# Patient Record
Sex: FEMALE | Race: WHITE | NOT HISPANIC OR LATINO
[De-identification: names, ages, dates, MRNs, and addresses within clinical notes are randomized per-mention and may not be internally consistent; named-entity substitution may affect disease eponyms.]

---

## 2017-02-26 ENCOUNTER — TRANSCRIPTION ENCOUNTER (OUTPATIENT)
Age: 82
End: 2017-02-26

## 2018-08-07 ENCOUNTER — APPOINTMENT (OUTPATIENT)
Dept: THORACIC SURGERY | Facility: CLINIC | Age: 83
End: 2018-08-07
Payer: MEDICARE

## 2018-08-07 VITALS
OXYGEN SATURATION: 95 % | HEIGHT: 65 IN | SYSTOLIC BLOOD PRESSURE: 129 MMHG | BODY MASS INDEX: 18.99 KG/M2 | TEMPERATURE: 98 F | DIASTOLIC BLOOD PRESSURE: 70 MMHG | RESPIRATION RATE: 18 BRPM | HEART RATE: 87 BPM | WEIGHT: 114 LBS

## 2018-08-07 DIAGNOSIS — Z87.09 PERSONAL HISTORY OF OTHER DISEASES OF THE RESPIRATORY SYSTEM: ICD-10-CM

## 2018-08-07 DIAGNOSIS — R91.1 SOLITARY PULMONARY NODULE: ICD-10-CM

## 2018-08-07 DIAGNOSIS — Z80.0 FAMILY HISTORY OF MALIGNANT NEOPLASM OF DIGESTIVE ORGANS: ICD-10-CM

## 2018-08-07 PROCEDURE — 99205 OFFICE O/P NEW HI 60 MIN: CPT

## 2018-08-07 RX ORDER — MULTIVIT-MIN/FA/LYCOPEN/LUTEIN .4-300-25
TABLET ORAL
Refills: 0 | Status: ACTIVE | COMMUNITY

## 2018-08-07 RX ORDER — PSYLLIUM HUSK 0.4 G
CAPSULE ORAL
Refills: 0 | Status: ACTIVE | COMMUNITY

## 2018-08-07 RX ORDER — ASPIRIN 81 MG
81 TABLET, DELAYED RELEASE (ENTERIC COATED) ORAL
Refills: 0 | Status: ACTIVE | COMMUNITY

## 2018-08-07 RX ORDER — BACILLUS COAGULANS/INULIN 1B-250 MG
CAPSULE ORAL
Refills: 0 | Status: ACTIVE | COMMUNITY

## 2018-08-07 RX ORDER — UBIDECARENONE/VIT E ACET 100MG-5
CAPSULE ORAL
Refills: 0 | Status: ACTIVE | COMMUNITY

## 2018-08-09 ENCOUNTER — RECORD ABSTRACTING (OUTPATIENT)
Age: 83
End: 2018-08-09

## 2018-08-16 ENCOUNTER — OUTPATIENT (OUTPATIENT)
Dept: OUTPATIENT SERVICES | Facility: HOSPITAL | Age: 83
LOS: 1 days | End: 2018-08-16
Payer: MEDICARE

## 2018-08-16 VITALS
HEIGHT: 64.5 IN | HEART RATE: 96 BPM | TEMPERATURE: 97 F | WEIGHT: 110.89 LBS | DIASTOLIC BLOOD PRESSURE: 70 MMHG | SYSTOLIC BLOOD PRESSURE: 130 MMHG | OXYGEN SATURATION: 96 % | RESPIRATION RATE: 14 BRPM

## 2018-08-16 DIAGNOSIS — R91.8 OTHER NONSPECIFIC ABNORMAL FINDING OF LUNG FIELD: ICD-10-CM

## 2018-08-16 DIAGNOSIS — J44.9 CHRONIC OBSTRUCTIVE PULMONARY DISEASE, UNSPECIFIED: ICD-10-CM

## 2018-08-16 LAB
ALBUMIN SERPL ELPH-MCNC: 4.3 G/DL — SIGNIFICANT CHANGE UP (ref 3.3–5)
ALP SERPL-CCNC: 96 U/L — SIGNIFICANT CHANGE UP (ref 40–120)
ALT FLD-CCNC: 22 U/L — SIGNIFICANT CHANGE UP (ref 4–33)
AST SERPL-CCNC: 26 U/L — SIGNIFICANT CHANGE UP (ref 4–32)
BILIRUB SERPL-MCNC: 0.4 MG/DL — SIGNIFICANT CHANGE UP (ref 0.2–1.2)
BLD GP AB SCN SERPL QL: NEGATIVE — SIGNIFICANT CHANGE UP
BUN SERPL-MCNC: 17 MG/DL — SIGNIFICANT CHANGE UP (ref 7–23)
CALCIUM SERPL-MCNC: 9.9 MG/DL — SIGNIFICANT CHANGE UP (ref 8.4–10.5)
CHLORIDE SERPL-SCNC: 102 MMOL/L — SIGNIFICANT CHANGE UP (ref 98–107)
CO2 SERPL-SCNC: 28 MMOL/L — SIGNIFICANT CHANGE UP (ref 22–31)
CREAT SERPL-MCNC: 0.65 MG/DL — SIGNIFICANT CHANGE UP (ref 0.5–1.3)
GLUCOSE SERPL-MCNC: 90 MG/DL — SIGNIFICANT CHANGE UP (ref 70–99)
HCT VFR BLD CALC: 42 % — SIGNIFICANT CHANGE UP (ref 34.5–45)
HGB BLD-MCNC: 14 G/DL — SIGNIFICANT CHANGE UP (ref 11.5–15.5)
MCHC RBC-ENTMCNC: 30.7 PG — SIGNIFICANT CHANGE UP (ref 27–34)
MCHC RBC-ENTMCNC: 33.3 % — SIGNIFICANT CHANGE UP (ref 32–36)
MCV RBC AUTO: 92.1 FL — SIGNIFICANT CHANGE UP (ref 80–100)
NRBC # FLD: 0 — SIGNIFICANT CHANGE UP
PLATELET # BLD AUTO: 246 K/UL — SIGNIFICANT CHANGE UP (ref 150–400)
PMV BLD: 10.7 FL — SIGNIFICANT CHANGE UP (ref 7–13)
POTASSIUM SERPL-MCNC: 4 MMOL/L — SIGNIFICANT CHANGE UP (ref 3.5–5.3)
POTASSIUM SERPL-SCNC: 4 MMOL/L — SIGNIFICANT CHANGE UP (ref 3.5–5.3)
PROT SERPL-MCNC: 7.2 G/DL — SIGNIFICANT CHANGE UP (ref 6–8.3)
RBC # BLD: 4.56 M/UL — SIGNIFICANT CHANGE UP (ref 3.8–5.2)
RBC # FLD: 13 % — SIGNIFICANT CHANGE UP (ref 10.3–14.5)
RH IG SCN BLD-IMP: POSITIVE — SIGNIFICANT CHANGE UP
SODIUM SERPL-SCNC: 141 MMOL/L — SIGNIFICANT CHANGE UP (ref 135–145)
WBC # BLD: 7.72 K/UL — SIGNIFICANT CHANGE UP (ref 3.8–10.5)
WBC # FLD AUTO: 7.72 K/UL — SIGNIFICANT CHANGE UP (ref 3.8–10.5)

## 2018-08-16 PROCEDURE — 93010 ELECTROCARDIOGRAM REPORT: CPT

## 2018-08-16 RX ORDER — SODIUM CHLORIDE 9 MG/ML
1000 INJECTION, SOLUTION INTRAVENOUS
Qty: 0 | Refills: 0 | Status: DISCONTINUED | OUTPATIENT
Start: 2018-08-23 | End: 2018-09-07

## 2018-08-16 NOTE — H&P PST ADULT - MUSCULOSKELETAL
details… detailed exam no joint erythema/no calf tenderness/no joint swelling/no joint warmth/normal strength/ROM intact

## 2018-08-16 NOTE — H&P PST ADULT - PMH
Colon Polyps    COPD (Chronic Obstructive Pulmonary Disease)  diagnose in ~2013  History of Osteoporosis    Lung nodules

## 2018-08-16 NOTE — H&P PST ADULT - PROBLEM SELECTOR PLAN 1
Pt scheduled for Flexible Bronchoscopy, Endobronchial Ultrasound with Cytology, Possible Mediastinoscopy on 08/23/18  Preop instructions provided. Pt verbalized understanding.   Pepcid for GI prophylaxis provided   s/p cardiac eval, copy in chart

## 2018-08-16 NOTE — H&P PST ADULT - NEGATIVE GENERAL GENITOURINARY SYMPTOMS
no renal colic/no hematuria/no flank pain L/normal urinary frequency/no incontinence/no flank pain R/no bladder infections

## 2018-08-16 NOTE — H&P PST ADULT - HISTORY OF PRESENT ILLNESS
83 y.o. female with hx of COPD, reports multiple right lung nodules noted on routine CXR, followed by CT/PET scan, reports occasional SOB, dyspnea on exertion, fatigue, weight loss of 15 lbs in last 6 months, reports occasional nonproductive cough, presents to [facility] for evaluation for Flexible Bronchoscopy, Endobronchial Ultrasound with Cytology, Possible Mediastinoscopy on 08/23/18 83 y.o. female with hx of COPD, c/o SOB, dyspnea on exertion, fatigue, weight loss of 15 lbs in last 6 months, reports occasional nonproductive cough, s/p CXR, followed by CT scan, reports multiple right lung nodules noted, presents to [facility] for evaluation for Flexible Bronchoscopy, Endobronchial Ultrasound with Cytology, Possible Mediastinoscopy on 08/23/18

## 2018-08-16 NOTE — H&P PST ADULT - FAMILY HISTORY
Father  Still living? Unknown  Family history of lung cancer, Age at diagnosis: Age Unknown     Sibling  Still living? Unknown  Family history of liver cancer, Age at diagnosis: Age Unknown

## 2018-08-16 NOTE — H&P PST ADULT - NEGATIVE MUSCULOSKELETAL SYMPTOMS
no arthritis/no joint swelling/no stiffness/no myalgia/no muscle cramps/no back pain/no neck pain/no muscle weakness

## 2018-08-17 ENCOUNTER — APPOINTMENT (OUTPATIENT)
Dept: PULMONOLOGY | Facility: CLINIC | Age: 83
End: 2018-08-17
Payer: MEDICARE

## 2018-08-17 PROCEDURE — 94726 PLETHYSMOGRAPHY LUNG VOLUMES: CPT

## 2018-08-17 PROCEDURE — 94729 DIFFUSING CAPACITY: CPT

## 2018-08-17 PROCEDURE — 94060 EVALUATION OF WHEEZING: CPT

## 2018-08-22 PROBLEM — R91.8 OTHER NONSPECIFIC ABNORMAL FINDING OF LUNG FIELD: Chronic | Status: ACTIVE | Noted: 2018-08-16

## 2018-08-23 ENCOUNTER — RESULT REVIEW (OUTPATIENT)
Age: 83
End: 2018-08-23

## 2018-08-23 ENCOUNTER — OUTPATIENT (OUTPATIENT)
Dept: OUTPATIENT SERVICES | Facility: HOSPITAL | Age: 83
LOS: 1 days | Discharge: HOME | End: 2018-08-23
Payer: MEDICARE

## 2018-08-23 ENCOUNTER — APPOINTMENT (OUTPATIENT)
Dept: THORACIC SURGERY | Facility: HOSPITAL | Age: 83
End: 2018-08-23
Payer: MEDICARE

## 2018-08-23 VITALS
SYSTOLIC BLOOD PRESSURE: 143 MMHG | RESPIRATION RATE: 16 BRPM | DIASTOLIC BLOOD PRESSURE: 556 MMHG | TEMPERATURE: 97 F | HEART RATE: 78 BPM

## 2018-08-23 VITALS
WEIGHT: 110.89 LBS | OXYGEN SATURATION: 96 % | SYSTOLIC BLOOD PRESSURE: 127 MMHG | TEMPERATURE: 98 F | HEIGHT: 64.5 IN | DIASTOLIC BLOOD PRESSURE: 64 MMHG | HEART RATE: 76 BPM | RESPIRATION RATE: 16 BRPM

## 2018-08-23 DIAGNOSIS — R91.8 OTHER NONSPECIFIC ABNORMAL FINDING OF LUNG FIELD: ICD-10-CM

## 2018-08-23 PROCEDURE — 31625 BRONCHOSCOPY W/BIOPSY(S): CPT

## 2018-08-23 PROCEDURE — 39402 MEDIASTINOSCPY W/LMPH NOD BX: CPT

## 2018-08-23 PROCEDURE — 88305 TISSUE EXAM BY PATHOLOGIST: CPT | Mod: 26,59

## 2018-08-23 PROCEDURE — 88305 TISSUE EXAM BY PATHOLOGIST: CPT | Mod: 26

## 2018-08-23 PROCEDURE — 71045 X-RAY EXAM CHEST 1 VIEW: CPT | Mod: 26

## 2018-08-23 PROCEDURE — 31652 BRONCH EBUS SAMPLNG 1/2 NODE: CPT

## 2018-08-23 PROCEDURE — 88173 CYTOPATH EVAL FNA REPORT: CPT | Mod: 26,59

## 2018-08-23 RX ORDER — FENTANYL CITRATE 50 UG/ML
50 INJECTION INTRAVENOUS
Qty: 0 | Refills: 0 | Status: DISCONTINUED | OUTPATIENT
Start: 2018-08-23 | End: 2018-08-23

## 2018-08-23 RX ORDER — ONDANSETRON 8 MG/1
4 TABLET, FILM COATED ORAL ONCE
Qty: 0 | Refills: 0 | Status: DISCONTINUED | OUTPATIENT
Start: 2018-08-23 | End: 2018-08-23

## 2018-08-23 RX ORDER — HYDROMORPHONE HYDROCHLORIDE 2 MG/ML
0.5 INJECTION INTRAMUSCULAR; INTRAVENOUS; SUBCUTANEOUS
Qty: 0 | Refills: 0 | Status: DISCONTINUED | OUTPATIENT
Start: 2018-08-23 | End: 2018-08-23

## 2018-08-23 RX ORDER — OXYCODONE HYDROCHLORIDE 5 MG/1
1 TABLET ORAL
Qty: 18 | Refills: 0 | OUTPATIENT
Start: 2018-08-23 | End: 2018-08-25

## 2018-08-23 RX ORDER — ACETAMINOPHEN 500 MG
1000 TABLET ORAL ONCE
Qty: 0 | Refills: 0 | Status: COMPLETED | OUTPATIENT
Start: 2018-08-23 | End: 2018-08-23

## 2018-08-23 RX ORDER — HYDROMORPHONE HYDROCHLORIDE 2 MG/ML
0.25 INJECTION INTRAMUSCULAR; INTRAVENOUS; SUBCUTANEOUS ONCE
Qty: 0 | Refills: 0 | Status: DISCONTINUED | OUTPATIENT
Start: 2018-08-23 | End: 2018-08-23

## 2018-08-23 RX ORDER — FENTANYL CITRATE 50 UG/ML
25 INJECTION INTRAVENOUS
Qty: 0 | Refills: 0 | Status: DISCONTINUED | OUTPATIENT
Start: 2018-08-23 | End: 2018-08-23

## 2018-08-23 RX ADMIN — HYDROMORPHONE HYDROCHLORIDE 0.25 MILLIGRAM(S): 2 INJECTION INTRAMUSCULAR; INTRAVENOUS; SUBCUTANEOUS at 13:45

## 2018-08-23 RX ADMIN — Medication 1000 MILLIGRAM(S): at 13:45

## 2018-08-23 RX ADMIN — Medication 400 MILLIGRAM(S): at 13:30

## 2018-08-23 RX ADMIN — HYDROMORPHONE HYDROCHLORIDE 0.25 MILLIGRAM(S): 2 INJECTION INTRAMUSCULAR; INTRAVENOUS; SUBCUTANEOUS at 14:00

## 2018-08-23 NOTE — ASU DISCHARGE PLAN (ADULT/PEDIATRIC). - CONDITIONS AT DISCHARGE
call md for  follow up appointment. Call md for any increase in pain fever or unable to tolerate food or fluids. stable

## 2018-08-23 NOTE — ASU DISCHARGE PLAN (ADULT/PEDIATRIC). - INSTRUCTIONS
Call to make an appointment for next week.  Please call at anytime if you have any questions or concerns.

## 2018-08-23 NOTE — ASU DISCHARGE PLAN (ADULT/PEDIATRIC). - MEDICATION SUMMARY - MEDICATIONS TO TAKE
I will START or STAY ON the medications listed below when I get home from the hospital:    Advair Diskus 250 mcg-50 mcg inhalation powder  -- 1 puff(s) inhaled 2 times a day  -- Indication: For Bronchospasm    Spiriva 18 mcg inhalation capsule  -- 1 cap(s) inhaled once a day  -- Indication: For Bronchospasm I will START or STAY ON the medications listed below when I get home from the hospital:    oxyCODONE 5 mg oral capsule  -- 1 cap(s) by mouth every 4 hours x 3 days, As Needed -for moderate pain MDD:5 caps   -- Caution federal law prohibits the transfer of this drug to any person other  than the person for whom it was prescribed.  It is very important that you take or use this exactly as directed.  Do not skip doses or discontinue unless directed by your doctor.  May cause drowsiness.  Alcohol may intensify this effect.  Use care when operating dangerous machinery.  This prescription cannot be refilled.  Using more of this medication than prescribed may cause serious breathing problems.    -- Indication: For Pain    Advair Diskus 250 mcg-50 mcg inhalation powder  -- 1 puff(s) inhaled 2 times a day  -- Indication: For Bronchospasm    Spiriva 18 mcg inhalation capsule  -- 1 cap(s) inhaled once a day  -- Indication: For Bronchospasm

## 2018-08-23 NOTE — ASU DISCHARGE PLAN (ADULT/PEDIATRIC). - NURSING INSTRUCTIONS
call md for follow up appointment. Call md for any increase in pain fever or unable to tolerate food or fluid

## 2018-08-26 ENCOUNTER — TRANSCRIPTION ENCOUNTER (OUTPATIENT)
Age: 83
End: 2018-08-26

## 2018-08-27 ENCOUNTER — APPOINTMENT (OUTPATIENT)
Dept: THORACIC SURGERY | Facility: HOSPITAL | Age: 83
End: 2018-08-27
Payer: MEDICARE

## 2018-08-27 ENCOUNTER — INPATIENT (INPATIENT)
Facility: HOSPITAL | Age: 83
LOS: 20 days | Discharge: NURSING, REHAB OR LONG-TERM CARE | End: 2018-09-17
Attending: THORACIC SURGERY (CARDIOTHORACIC VASCULAR SURGERY) | Admitting: THORACIC SURGERY (CARDIOTHORACIC VASCULAR SURGERY)
Payer: MEDICARE

## 2018-08-27 ENCOUNTER — RESULT REVIEW (OUTPATIENT)
Age: 83
End: 2018-08-27

## 2018-08-27 VITALS
WEIGHT: 110.89 LBS | RESPIRATION RATE: 24 BRPM | TEMPERATURE: 98 F | HEART RATE: 105 BPM | DIASTOLIC BLOOD PRESSURE: 51 MMHG | OXYGEN SATURATION: 93 % | SYSTOLIC BLOOD PRESSURE: 118 MMHG | HEIGHT: 64.5 IN

## 2018-08-27 DIAGNOSIS — R91.1 SOLITARY PULMONARY NODULE: ICD-10-CM

## 2018-08-27 LAB
ALBUMIN SERPL ELPH-MCNC: 3 G/DL — LOW (ref 3.3–5)
ALP SERPL-CCNC: 95 U/L — SIGNIFICANT CHANGE UP (ref 40–120)
ALT FLD-CCNC: 17 U/L — SIGNIFICANT CHANGE UP (ref 4–33)
AST SERPL-CCNC: 25 U/L — SIGNIFICANT CHANGE UP (ref 4–32)
BILIRUB SERPL-MCNC: 0.7 MG/DL — SIGNIFICANT CHANGE UP (ref 0.2–1.2)
BUN SERPL-MCNC: 17 MG/DL — SIGNIFICANT CHANGE UP (ref 7–23)
CALCIUM SERPL-MCNC: 8.8 MG/DL — SIGNIFICANT CHANGE UP (ref 8.4–10.5)
CHLORIDE SERPL-SCNC: 100 MMOL/L — SIGNIFICANT CHANGE UP (ref 98–107)
CO2 SERPL-SCNC: 21 MMOL/L — LOW (ref 22–31)
CREAT SERPL-MCNC: 0.57 MG/DL — SIGNIFICANT CHANGE UP (ref 0.5–1.3)
GLUCOSE SERPL-MCNC: 105 MG/DL — HIGH (ref 70–99)
HCT VFR BLD CALC: 40.5 % — SIGNIFICANT CHANGE UP (ref 34.5–45)
HGB BLD-MCNC: 13.2 G/DL — SIGNIFICANT CHANGE UP (ref 11.5–15.5)
MAGNESIUM SERPL-MCNC: 2 MG/DL — SIGNIFICANT CHANGE UP (ref 1.6–2.6)
MCHC RBC-ENTMCNC: 30.3 PG — SIGNIFICANT CHANGE UP (ref 27–34)
MCHC RBC-ENTMCNC: 32.6 % — SIGNIFICANT CHANGE UP (ref 32–36)
MCV RBC AUTO: 93.1 FL — SIGNIFICANT CHANGE UP (ref 80–100)
NRBC # FLD: 0 — SIGNIFICANT CHANGE UP
PHOSPHATE SERPL-MCNC: 3 MG/DL — SIGNIFICANT CHANGE UP (ref 2.5–4.5)
PLATELET # BLD AUTO: 219 K/UL — SIGNIFICANT CHANGE UP (ref 150–400)
PMV BLD: 10.2 FL — SIGNIFICANT CHANGE UP (ref 7–13)
POTASSIUM SERPL-MCNC: 3.8 MMOL/L — SIGNIFICANT CHANGE UP (ref 3.5–5.3)
POTASSIUM SERPL-SCNC: 3.8 MMOL/L — SIGNIFICANT CHANGE UP (ref 3.5–5.3)
PROT SERPL-MCNC: 6.6 G/DL — SIGNIFICANT CHANGE UP (ref 6–8.3)
RBC # BLD: 4.35 M/UL — SIGNIFICANT CHANGE UP (ref 3.8–5.2)
RBC # FLD: 13 % — SIGNIFICANT CHANGE UP (ref 10.3–14.5)
SODIUM SERPL-SCNC: 137 MMOL/L — SIGNIFICANT CHANGE UP (ref 135–145)
WBC # BLD: 9.79 K/UL — SIGNIFICANT CHANGE UP (ref 3.8–10.5)
WBC # FLD AUTO: 9.79 K/UL — SIGNIFICANT CHANGE UP (ref 3.8–10.5)

## 2018-08-27 PROCEDURE — 31622 DX BRONCHOSCOPE/WASH: CPT

## 2018-08-27 PROCEDURE — 88313 SPECIAL STAINS GROUP 2: CPT | Mod: 26

## 2018-08-27 PROCEDURE — 32663 THORACOSCOPY W/LOBECTOMY: CPT | Mod: RT

## 2018-08-27 PROCEDURE — 88309 TISSUE EXAM BY PATHOLOGIST: CPT | Mod: 26

## 2018-08-27 PROCEDURE — 88331 PATH CONSLTJ SURG 1 BLK 1SPC: CPT | Mod: 26

## 2018-08-27 PROCEDURE — 99233 SBSQ HOSP IP/OBS HIGH 50: CPT

## 2018-08-27 PROCEDURE — 71045 X-RAY EXAM CHEST 1 VIEW: CPT | Mod: 26

## 2018-08-27 RX ORDER — ACETAMINOPHEN 500 MG
1000 TABLET ORAL ONCE
Qty: 0 | Refills: 0 | Status: COMPLETED | OUTPATIENT
Start: 2018-08-28 | End: 2018-08-28

## 2018-08-27 RX ORDER — METOPROLOL TARTRATE 50 MG
2.5 TABLET ORAL ONCE
Qty: 0 | Refills: 0 | Status: COMPLETED | OUTPATIENT
Start: 2018-08-27 | End: 2018-08-27

## 2018-08-27 RX ORDER — SODIUM CHLORIDE 9 MG/ML
1000 INJECTION, SOLUTION INTRAVENOUS
Qty: 0 | Refills: 0 | Status: DISCONTINUED | OUTPATIENT
Start: 2018-08-27 | End: 2018-08-30

## 2018-08-27 RX ORDER — MAGNESIUM SULFATE 500 MG/ML
1 VIAL (ML) INJECTION ONCE
Qty: 0 | Refills: 0 | Status: COMPLETED | OUTPATIENT
Start: 2018-08-27 | End: 2018-08-27

## 2018-08-27 RX ORDER — IPRATROPIUM BROMIDE 0.2 MG/ML
500 SOLUTION, NON-ORAL INHALATION EVERY 6 HOURS
Qty: 0 | Refills: 0 | Status: DISCONTINUED | OUTPATIENT
Start: 2018-08-27 | End: 2018-08-27

## 2018-08-27 RX ORDER — ALBUTEROL 90 UG/1
2 AEROSOL, METERED ORAL EVERY 4 HOURS
Qty: 0 | Refills: 0 | Status: DISCONTINUED | OUTPATIENT
Start: 2018-08-27 | End: 2018-08-27

## 2018-08-27 RX ORDER — FLUTICASONE PROPIONATE AND SALMETEROL 50; 250 UG/1; UG/1
1 POWDER ORAL; RESPIRATORY (INHALATION)
Qty: 0 | Refills: 0 | COMMUNITY

## 2018-08-27 RX ORDER — ACETAMINOPHEN 500 MG
1000 TABLET ORAL ONCE
Qty: 0 | Refills: 0 | Status: COMPLETED | OUTPATIENT
Start: 2018-08-27 | End: 2018-08-28

## 2018-08-27 RX ORDER — PANTOPRAZOLE SODIUM 20 MG/1
40 TABLET, DELAYED RELEASE ORAL
Qty: 0 | Refills: 0 | Status: DISCONTINUED | OUTPATIENT
Start: 2018-08-27 | End: 2018-08-27

## 2018-08-27 RX ORDER — TIOTROPIUM BROMIDE 18 UG/1
1 CAPSULE ORAL; RESPIRATORY (INHALATION)
Qty: 0 | Refills: 0 | COMMUNITY

## 2018-08-27 RX ORDER — HYDROMORPHONE HYDROCHLORIDE 2 MG/ML
30 INJECTION INTRAMUSCULAR; INTRAVENOUS; SUBCUTANEOUS
Qty: 0 | Refills: 0 | Status: DISCONTINUED | OUTPATIENT
Start: 2018-08-27 | End: 2018-08-31

## 2018-08-27 RX ORDER — DOCUSATE SODIUM 100 MG
100 CAPSULE ORAL THREE TIMES A DAY
Qty: 0 | Refills: 0 | Status: DISCONTINUED | OUTPATIENT
Start: 2018-08-27 | End: 2018-09-01

## 2018-08-27 RX ORDER — FAMOTIDINE 10 MG/ML
20 INJECTION INTRAVENOUS DAILY
Qty: 0 | Refills: 0 | Status: DISCONTINUED | OUTPATIENT
Start: 2018-08-27 | End: 2018-09-09

## 2018-08-27 RX ORDER — NALOXONE HYDROCHLORIDE 4 MG/.1ML
0.1 SPRAY NASAL
Qty: 0 | Refills: 0 | Status: DISCONTINUED | OUTPATIENT
Start: 2018-08-27 | End: 2018-08-31

## 2018-08-27 RX ORDER — IPRATROPIUM BROMIDE 0.2 MG/ML
500 SOLUTION, NON-ORAL INHALATION EVERY 6 HOURS
Qty: 0 | Refills: 0 | Status: DISCONTINUED | OUTPATIENT
Start: 2018-08-27 | End: 2018-09-17

## 2018-08-27 RX ORDER — TIOTROPIUM BROMIDE 18 UG/1
1 CAPSULE ORAL; RESPIRATORY (INHALATION) DAILY
Qty: 0 | Refills: 0 | Status: DISCONTINUED | OUTPATIENT
Start: 2018-08-27 | End: 2018-08-27

## 2018-08-27 RX ORDER — ONDANSETRON 8 MG/1
4 TABLET, FILM COATED ORAL EVERY 6 HOURS
Qty: 0 | Refills: 0 | Status: DISCONTINUED | OUTPATIENT
Start: 2018-08-27 | End: 2018-08-31

## 2018-08-27 RX ORDER — HYDROMORPHONE HYDROCHLORIDE 2 MG/ML
0.5 INJECTION INTRAMUSCULAR; INTRAVENOUS; SUBCUTANEOUS
Qty: 0 | Refills: 0 | Status: DISCONTINUED | OUTPATIENT
Start: 2018-08-27 | End: 2018-08-31

## 2018-08-27 RX ORDER — BUDESONIDE AND FORMOTEROL FUMARATE DIHYDRATE 160; 4.5 UG/1; UG/1
2 AEROSOL RESPIRATORY (INHALATION)
Qty: 0 | Refills: 0 | Status: DISCONTINUED | OUTPATIENT
Start: 2018-08-27 | End: 2018-08-30

## 2018-08-27 RX ORDER — HEPARIN SODIUM 5000 [USP'U]/ML
5000 INJECTION INTRAVENOUS; SUBCUTANEOUS EVERY 8 HOURS
Qty: 0 | Refills: 0 | Status: DISCONTINUED | OUTPATIENT
Start: 2018-08-27 | End: 2018-09-09

## 2018-08-27 RX ORDER — IPRATROPIUM/ALBUTEROL SULFATE 18-103MCG
3 AEROSOL WITH ADAPTER (GRAM) INHALATION EVERY 6 HOURS
Qty: 0 | Refills: 0 | Status: DISCONTINUED | OUTPATIENT
Start: 2018-08-27 | End: 2018-08-27

## 2018-08-27 RX ADMIN — Medication 500 MICROGRAM(S): at 22:02

## 2018-08-27 RX ADMIN — HYDROMORPHONE HYDROCHLORIDE 30 MILLILITER(S): 2 INJECTION INTRAMUSCULAR; INTRAVENOUS; SUBCUTANEOUS at 17:57

## 2018-08-27 RX ADMIN — SODIUM CHLORIDE 30 MILLILITER(S): 9 INJECTION, SOLUTION INTRAVENOUS at 19:32

## 2018-08-27 RX ADMIN — Medication 2.5 MILLIGRAM(S): at 21:00

## 2018-08-27 RX ADMIN — Medication 100 GRAM(S): at 18:52

## 2018-08-27 RX ADMIN — HYDROMORPHONE HYDROCHLORIDE 30 MILLILITER(S): 2 INJECTION INTRAMUSCULAR; INTRAVENOUS; SUBCUTANEOUS at 19:32

## 2018-08-27 RX ADMIN — SODIUM CHLORIDE 30 MILLILITER(S): 9 INJECTION, SOLUTION INTRAVENOUS at 17:50

## 2018-08-27 RX ADMIN — Medication 100 GRAM(S): at 21:00

## 2018-08-27 RX ADMIN — Medication 2.5 MILLIGRAM(S): at 18:29

## 2018-08-27 RX ADMIN — HEPARIN SODIUM 5000 UNIT(S): 5000 INJECTION INTRAVENOUS; SUBCUTANEOUS at 21:00

## 2018-08-27 NOTE — PROGRESS NOTE ADULT - SUBJECTIVE AND OBJECTIVE BOX
LIAM VELOZ          MRN-[id number]    HPI:      Procedure:  POD # :     Issues:        Interval/Overnight Events/ ROS  Pt remained hemodynamically stable overnight, not on any pressors or inotropes. OOB to chair, breathing comfortably with minimal pain. Ambulated several times . Denies pain, no SOB, no palpitations, no nausea/ no vomiting, no dizziness  A-line and soriano d/trina         PAST MEDICAL & SURGICAL HISTORY:  Lung nodules  COPD (Chronic Obstructive Pulmonary Disease): diagnose in ~2013  Colon Polyps  History of Osteoporosis  History of Cataract Surgery: left eye  Hip Replacement: bilateral    Allergies    No Known Allergies    Intolerances            ***VITAL SIGNS:  Vital Signs Last 24 Hrs  T(C): 36.4 (27 Aug 2018 17:45), Max: 36.6 (27 Aug 2018 12:29)  T(F): 97.6 (27 Aug 2018 17:45), Max: 97.8 (27 Aug 2018 12:29)  HR: 95 (27 Aug 2018 18:15) (95 - 111)  BP: 159/77 (27 Aug 2018 18:15) (118/51 - 169/90)  BP(mean): 95 (27 Aug 2018 18:15) (95 - 105)  RR: 24 (27 Aug 2018 18:15) (21 - 24)  SpO2: 96% (27 Aug 2018 18:15) (87% - 99%)    I/Os:   I&O's Detail    27 Aug 2018 07:01  -  27 Aug 2018 18:21  --------------------------------------------------------  IN:    lactated ringers.: 60 mL  Total IN: 60 mL    OUT:    Chest Tube: 10 mL  Total OUT: 10 mL    Total NET: 50 mL          CAPILLARY BLOOD GLUCOSE          =======================  MEDICATIONS  ===================  MEDICATIONS  (STANDING):  ALBUTerol/ipratropium for Nebulization 3 milliLiter(s) Nebulizer every 6 hours  docusate sodium 100 milliGRAM(s) Oral three times a day  famotidine    Tablet 20 milliGRAM(s) Oral daily  heparin  Injectable 5000 Unit(s) SubCutaneous every 8 hours  HYDROmorphone PCA (1 mG/mL) 30 milliLiter(s) PCA Continuous PCA Continuous  lactated ringers. 1000 milliLiter(s) (30 mL/Hr) IV Continuous <Continuous>  metoprolol tartrate Injectable 2.5 milliGRAM(s) IV Push once  tiotropium 18 MICROgram(s) Capsule 1 Capsule(s) Inhalation daily    MEDICATIONS  (PRN):  HYDROmorphone PCA (1 mG/mL) Rescue Clinician Bolus 0.5 milliGRAM(s) IV Push every 15 minutes PRN for Pain Scale GREATER THAN 6  naloxone Injectable 0.1 milliGRAM(s) IV Push every 3 minutes PRN For ANY of the following changes in patient status:  A. RR LESS THAN 10 breaths per minute, B. Oxygen saturation LESS THAN 90%, C. Sedation score of 6  ondansetron Injectable 4 milliGRAM(s) IV Push every 6 hours PRN Nausea      ======================VENTILATOR SETTINGS  ==============      =================== PATIENT CARE ACCESS DEVICES ==========  Peripheral IV  Central Venous Line	R	L	IJ	Fem	SC			Placed:   Arterial Line	R	L	PT	DP	Fem	Rad	Ax	Placed:   Midline:				  Urinary Catheter, Date Placed:   Necessity of urinary, arterial, and venous catheters discussed    ======================= PHYSICAL EXAM===================  General:                         Comfortable, Awake, alert, not in any distress  Neuro:                            Moving all extremities to commands. No focal deficits	  HEENT:                           ELE/ ETT/ NGT/ trach  Respiratory:	Lungs clear on auscultation bilaterally with good aeration.                                           No rales, rhonchi, no wheezing. Effort even and unlabored.  CV:		Regular rate and rhythm. Normal S1/S2. No murmurs  Abdomen:	                     Soft,  nontender, not-distended. Bowel sounds present / absent.   Skin:		No rash.  Extremities:	Warm, no cyanosis or edema.  Palpable pulses    ============================ LABS =======================                      ===================== IMAGING STUDIES ===================  Radiology personally reviewed.    ====================ASSESSMENT AND PLAN ================      ====================== NEUROLOGY=======================  Pain control with PCA / PCEA / Tylenol IV / Toradol / Percocet  Pt is on Precedex for agitation  Pt is sedated with Propofol / Fentanyl    ==================== RESPIRATORY========================  Pt is on            L nasal canula / Face tent____% FiO2  Comfortable, no evidence of distress.  Using incentive spirometry & doing                ml  Monitor chest tube output  Chest tube to suction / water seal	    Mechanical Ventilation:    Mechanical ventilator status assessed & settings reviewed  Continue bronchodilators, pulmonary toilet  Head of bed elevation to 30-40 degrees    ====================CARDIOVASCULAR=====================  Continue hemodynamic monitoring/ telemetry  Not on any pressors  Continue cardiovascular / antihypertensive medications    ===================== RENAL ============================  Continue LR 30CC/hr      D/C IVF  Monitor I/Os, BUN/ Cr  and electrolytes  D/C Soriano      Keep Soriano for UO monitoring  BPH: Continue Flomax/ Finasteride      ==================== GASTROINTESTINAL===================  On regular diet, tolerating well  Continue GI prophylaxis with Pepcid / Protonix  Continue Zofran / Reglan for nausea - PRN	  NPO    =======================    ENDOCRIN  =====================  Glycemic monitoring  F/S with coverage  ===================HEMATOLOGIC/ONCOLOGIC =============  Monitor chest tube output. No signs of active bleeding.   Follow CBC, coags  in AM  DVT prophylaxis with SCD, sc Heparin    ========================INFECTIOUS DISEASE===============  No signs of infection. Monitor for fever / leukocytosis.  All surgical incision / chest tube  sites look clean  D/C Soriano      Pertinent clinical, laboratory, radiographic, hemodynamic, echocardiographic, respiratory data, microbiologic data and chart were reviewed and analyzed frequently throughout the course of the day and night. GI and DVT prophylaxis, glycemic control, head of bed elevation and skin care issues were addressed.  Patient seen, examined and plan discussed with CT Surgery / CTICU team during rounds.  Pt remains critically ill in imminent risk of  deterioration and requires very careful cardio- pulmonary monitoring and support.    I have spent               minutes of critical care time with this pt between            am/pm    and               am/ pm         minutes spent on total encounter; more than 50% of the visit was spent counseling and/or coordinating care by the attending physician.        ALVARO Snowden MD LIAM VELOZ          [id number]    HPI:     Pre-Op Diagnosis:  Lung nodule  08/27/2018       Post-Op Dx:  Lung nodule  08/27/2018       Procedure:    Procedure:  Lobectomy of right lung with video-assisted thoracoscopic surgery  08/27/2018  Right upper lobectomy  Active  AMATTIA  Flexible bronchoscopy with bronchopulmonary lavage  08/27/2018    Active  AMATTIA.    Procedure:  POD # :     Issues:        Interval/Overnight Events/ ROS  Pt remained hemodynamically stable overnight, not on any pressors or inotropes. OOB to chair, breathing comfortably with minimal pain. Ambulated several times . Denies pain, no SOB, no palpitations, no nausea/ no vomiting, no dizziness  A-line and soriano d/trina         PAST MEDICAL & SURGICAL HISTORY:  Lung nodules  COPD (Chronic Obstructive Pulmonary Disease): diagnose in ~2013  Colon Polyps  History of Osteoporosis  History of Cataract Surgery: left eye  Hip Replacement: bilateral    Allergies    No Known Allergies    Intolerances            ***VITAL SIGNS:  Vital Signs Last 24 Hrs  T(C): 36.4 (27 Aug 2018 17:45), Max: 36.6 (27 Aug 2018 12:29)  T(F): 97.6 (27 Aug 2018 17:45), Max: 97.8 (27 Aug 2018 12:29)  HR: 95 (27 Aug 2018 18:15) (95 - 111)  BP: 159/77 (27 Aug 2018 18:15) (118/51 - 169/90)  BP(mean): 95 (27 Aug 2018 18:15) (95 - 105)  RR: 24 (27 Aug 2018 18:15) (21 - 24)  SpO2: 96% (27 Aug 2018 18:15) (87% - 99%)    I/Os:   I&O's Detail    27 Aug 2018 07:01  -  27 Aug 2018 18:21  --------------------------------------------------------  IN:    lactated ringers.: 60 mL  Total IN: 60 mL    OUT:    Chest Tube: 10 mL  Total OUT: 10 mL    Total NET: 50 mL          CAPILLARY BLOOD GLUCOSE          =======================  MEDICATIONS  ===================  MEDICATIONS  (STANDING):  ALBUTerol/ipratropium for Nebulization 3 milliLiter(s) Nebulizer every 6 hours  docusate sodium 100 milliGRAM(s) Oral three times a day  famotidine    Tablet 20 milliGRAM(s) Oral daily  heparin  Injectable 5000 Unit(s) SubCutaneous every 8 hours  HYDROmorphone PCA (1 mG/mL) 30 milliLiter(s) PCA Continuous PCA Continuous  lactated ringers. 1000 milliLiter(s) (30 mL/Hr) IV Continuous <Continuous>  metoprolol tartrate Injectable 2.5 milliGRAM(s) IV Push once  tiotropium 18 MICROgram(s) Capsule 1 Capsule(s) Inhalation daily    MEDICATIONS  (PRN):  HYDROmorphone PCA (1 mG/mL) Rescue Clinician Bolus 0.5 milliGRAM(s) IV Push every 15 minutes PRN for Pain Scale GREATER THAN 6  naloxone Injectable 0.1 milliGRAM(s) IV Push every 3 minutes PRN For ANY of the following changes in patient status:  A. RR LESS THAN 10 breaths per minute, B. Oxygen saturation LESS THAN 90%, C. Sedation score of 6  ondansetron Injectable 4 milliGRAM(s) IV Push every 6 hours PRN Nausea      ======================VENTILATOR SETTINGS  ==============      =================== PATIENT CARE ACCESS DEVICES ==========  Peripheral IV  Central Venous Line	R	L	IJ	Fem	SC			Placed:   Arterial Line	R	L	PT	DP	Fem	Rad	Ax	Placed:   Midline:				  Urinary Catheter, Date Placed:   Necessity of urinary, arterial, and venous catheters discussed    ======================= PHYSICAL EXAM===================  General:                         Comfortable, Awake, alert, not in any distress  Neuro:                            Moving all extremities to commands. No focal deficits	  HEENT:                           ELE/ ETT/ NGT/ trach  Respiratory:	Lungs clear on auscultation bilaterally with good aeration.                                           No rales, rhonchi, no wheezing. Effort even and unlabored.  CV:		Regular rate and rhythm. Normal S1/S2. No murmurs  Abdomen:	                     Soft,  nontender, not-distended. Bowel sounds present / absent.   Skin:		No rash.  Extremities:	Warm, no cyanosis or edema.  Palpable pulses    ============================ LABS =======================                      ===================== IMAGING STUDIES ===================  Radiology personally reviewed.    ====================ASSESSMENT AND PLAN ================      ====================== NEUROLOGY=======================  Pain control with PCA / PCEA / Tylenol IV / Toradol / Percocet  Pt is on Precedex for agitation  Pt is sedated with Propofol / Fentanyl    ==================== RESPIRATORY========================  Pt is on            L nasal canula / Face tent____% FiO2  Comfortable, no evidence of distress.  Using incentive spirometry & doing                ml  Monitor chest tube output  Chest tube to suction / water seal	    Mechanical Ventilation:    Mechanical ventilator status assessed & settings reviewed  Continue bronchodilators, pulmonary toilet  Head of bed elevation to 30-40 degrees    ====================CARDIOVASCULAR=====================  Continue hemodynamic monitoring/ telemetry  Not on any pressors  Continue cardiovascular / antihypertensive medications    ===================== RENAL ============================  Continue LR 30CC/hr      D/C IVF  Monitor I/Os, BUN/ Cr  and electrolytes  D/C Soriano      Keep Soriano for UO monitoring  BPH: Continue Flomax/ Finasteride      ==================== GASTROINTESTINAL===================  On regular diet, tolerating well  Continue GI prophylaxis with Pepcid / Protonix  Continue Zofran / Reglan for nausea - PRN	  NPO    =======================    ENDOCRIN  =====================  Glycemic monitoring  F/S with coverage  ===================HEMATOLOGIC/ONCOLOGIC =============  Monitor chest tube output. No signs of active bleeding.   Follow CBC, coags  in AM  DVT prophylaxis with SCD, sc Heparin    ========================INFECTIOUS DISEASE===============  No signs of infection. Monitor for fever / leukocytosis.  All surgical incision / chest tube  sites look clean  D/C Soriano      Pertinent clinical, laboratory, radiographic, hemodynamic, echocardiographic, respiratory data, microbiologic data and chart were reviewed and analyzed frequently throughout the course of the day and night. GI and DVT prophylaxis, glycemic control, head of bed elevation and skin care issues were addressed.  Patient seen, examined and plan discussed with CT Surgery / CTICU team during rounds.  Pt remains critically ill in imminent risk of  deterioration and requires very careful cardio- pulmonary monitoring and support.    I have spent               minutes of critical care time with this pt between            am/pm    and               am/ pm         minutes spent on total encounter; more than 50% of the visit was spent counseling and/or coordinating care by the attending physician.        ALVARO Snowden MD LIAM VELOZ          [id number]    HPI:     Pre-Op Diagnosis:  Lung nodule  08/27/2018       Post-Op Dx:  Lung nodule  08/27/2018          Procedure:  Lobectomy of right lung with video-assisted thoracoscopic surgery  08/27/2018  Right upper lobectomy  Active  AMATTIA  Flexible bronchoscopy with bronchopulmonary lavage  08/27/2018    Active  AMATTIA.    Procedure:  POD # :     Issues:        Interval/Overnight Events/ ROS  Pt remained hemodynamically stable overnight, not on any pressors or inotropes. OOB to chair, breathing comfortably with minimal pain. Ambulated several times . Denies pain, no SOB, no palpitations, no nausea/ no vomiting, no dizziness  A-line and soriano d/trina         PAST MEDICAL & SURGICAL HISTORY:  Lung nodules  COPD (Chronic Obstructive Pulmonary Disease): diagnose in ~2013  Colon Polyps  History of Osteoporosis  History of Cataract Surgery: left eye  Hip Replacement: bilateral    Allergies    No Known Allergies    Intolerances            ***VITAL SIGNS:  Vital Signs Last 24 Hrs  T(C): 36.4 (27 Aug 2018 17:45), Max: 36.6 (27 Aug 2018 12:29)  T(F): 97.6 (27 Aug 2018 17:45), Max: 97.8 (27 Aug 2018 12:29)  HR: 95 (27 Aug 2018 18:15) (95 - 111)  BP: 159/77 (27 Aug 2018 18:15) (118/51 - 169/90)  BP(mean): 95 (27 Aug 2018 18:15) (95 - 105)  RR: 24 (27 Aug 2018 18:15) (21 - 24)  SpO2: 96% (27 Aug 2018 18:15) (87% - 99%)    I/Os:   I&O's Detail    27 Aug 2018 07:01  -  27 Aug 2018 18:21  --------------------------------------------------------  IN:    lactated ringers.: 60 mL  Total IN: 60 mL    OUT:    Chest Tube: 10 mL  Total OUT: 10 mL    Total NET: 50 mL          CAPILLARY BLOOD GLUCOSE          =======================  MEDICATIONS  ===================  MEDICATIONS  (STANDING):  ALBUTerol/ipratropium for Nebulization 3 milliLiter(s) Nebulizer every 6 hours  docusate sodium 100 milliGRAM(s) Oral three times a day  famotidine    Tablet 20 milliGRAM(s) Oral daily  heparin  Injectable 5000 Unit(s) SubCutaneous every 8 hours  HYDROmorphone PCA (1 mG/mL) 30 milliLiter(s) PCA Continuous PCA Continuous  lactated ringers. 1000 milliLiter(s) (30 mL/Hr) IV Continuous <Continuous>  metoprolol tartrate Injectable 2.5 milliGRAM(s) IV Push once  tiotropium 18 MICROgram(s) Capsule 1 Capsule(s) Inhalation daily    MEDICATIONS  (PRN):  HYDROmorphone PCA (1 mG/mL) Rescue Clinician Bolus 0.5 milliGRAM(s) IV Push every 15 minutes PRN for Pain Scale GREATER THAN 6  naloxone Injectable 0.1 milliGRAM(s) IV Push every 3 minutes PRN For ANY of the following changes in patient status:  A. RR LESS THAN 10 breaths per minute, B. Oxygen saturation LESS THAN 90%, C. Sedation score of 6  ondansetron Injectable 4 milliGRAM(s) IV Push every 6 hours PRN Nausea      ======================VENTILATOR SETTINGS  ==============      =================== PATIENT CARE ACCESS DEVICES ==========  Peripheral IV  Central Venous Line	R	L	IJ	Fem	SC			Placed:   Arterial Line	R	L	PT	DP	Fem	Rad	Ax	Placed:   Midline:				  Urinary Catheter, Date Placed:   Necessity of urinary, arterial, and venous catheters discussed    ======================= PHYSICAL EXAM===================  General:                         Comfortable, Awake, alert, not in any distress  Neuro:                            Moving all extremities to commands. No focal deficits	  HEENT:                           ELE/ ETT/ NGT/ trach  Respiratory:	Lungs clear on auscultation bilaterally with good aeration.                                           No rales, rhonchi, no wheezing. Effort even and unlabored.  CV:		Regular rate and rhythm. Normal S1/S2. No murmurs  Abdomen:	                     Soft,  nontender, not-distended. Bowel sounds present / absent.   Skin:		No rash.  Extremities:	Warm, no cyanosis or edema.  Palpable pulses    ============================ LABS =======================                      ===================== IMAGING STUDIES ===================  Radiology personally reviewed.    ====================ASSESSMENT AND PLAN ================      ====================== NEUROLOGY=======================  Pain control with PCA / PCEA / Tylenol IV / Toradol / Percocet  Pt is on Precedex for agitation  Pt is sedated with Propofol / Fentanyl    ==================== RESPIRATORY========================  Pt is on            L nasal canula / Face tent____% FiO2  Comfortable, no evidence of distress.  Using incentive spirometry & doing                ml  Monitor chest tube output  Chest tube to suction / water seal	    Mechanical Ventilation:    Mechanical ventilator status assessed & settings reviewed  Continue bronchodilators, pulmonary toilet  Head of bed elevation to 30-40 degrees    ====================CARDIOVASCULAR=====================  Continue hemodynamic monitoring/ telemetry  Not on any pressors  Continue cardiovascular / antihypertensive medications    ===================== RENAL ============================  Continue LR 30CC/hr      D/C IVF  Monitor I/Os, BUN/ Cr  and electrolytes  D/C Soriano      Keep Soriano for UO monitoring  BPH: Continue Flomax/ Finasteride      ==================== GASTROINTESTINAL===================  On regular diet, tolerating well  Continue GI prophylaxis with Pepcid / Protonix  Continue Zofran / Reglan for nausea - PRN	  NPO    =======================    ENDOCRIN  =====================  Glycemic monitoring  F/S with coverage  ===================HEMATOLOGIC/ONCOLOGIC =============  Monitor chest tube output. No signs of active bleeding.   Follow CBC, coags  in AM  DVT prophylaxis with SCD, sc Heparin    ========================INFECTIOUS DISEASE===============  No signs of infection. Monitor for fever / leukocytosis.  All surgical incision / chest tube  sites look clean  D/C Soriano      Pertinent clinical, laboratory, radiographic, hemodynamic, echocardiographic, respiratory data, microbiologic data and chart were reviewed and analyzed frequently throughout the course of the day and night. GI and DVT prophylaxis, glycemic control, head of bed elevation and skin care issues were addressed.  Patient seen, examined and plan discussed with CT Surgery / CTICU team during rounds.  Pt remains critically ill in imminent risk of  deterioration and requires very careful cardio- pulmonary monitoring and support.    I have spent               minutes of critical care time with this pt between            am/pm    and               am/ pm         minutes spent on total encounter; more than 50% of the visit was spent counseling and/or coordinating care by the attending physician.        ALVARO Snowden MD LIAM VELOZ          MRN-[id number]    HPI   :83 y.o. female with hx of COPD, c/o SOB, dyspnea on exertion, fatigue, weight loss of 15 lbs in last 6 months, reports occasional nonproductive cough, s/p CXR, followed by CT scan, reports multiple right lung nodules noted admitted  for lung resection     Pre-Op Diagnosis:  Lung nodule  08/27/2018       Post-Op Dx:  Lung nodule  08/27/2018          Procedure:  Lobectomy of right lung with video-assisted thoracoscopic surgery  08/27/2018  Right upper lobectomy  Active  AMATTIA  Flexible bronchoscopy with bronchopulmonary lavage  08/27/2018    Active  AMATTIA.    Procedure:  POD # :     Issues:        Interval/Overnight Events/ ROS  Pt remained hemodynamically stable overnight, not on any pressors or inotropes. OOB to chair, breathing comfortably with minimal pain. Ambulated several times . Denies pain, no SOB, no palpitations, no nausea/ no vomiting, no dizziness  A-line and soriano d/trina         PAST MEDICAL & SURGICAL HISTORY:  Lung nodules  COPD (Chronic Obstructive Pulmonary Disease): diagnose in ~2013  Colon Polyps  History of Osteoporosis  History of Cataract Surgery: left eye  Hip Replacement: bilateral    Allergies    No Known Allergies    Intolerances            ***VITAL SIGNS:  Vital Signs Last 24 Hrs  T(C): 36.4 (27 Aug 2018 17:45), Max: 36.6 (27 Aug 2018 12:29)  T(F): 97.6 (27 Aug 2018 17:45), Max: 97.8 (27 Aug 2018 12:29)  HR: 95 (27 Aug 2018 18:15) (95 - 111)  BP: 159/77 (27 Aug 2018 18:15) (118/51 - 169/90)  BP(mean): 95 (27 Aug 2018 18:15) (95 - 105)  RR: 24 (27 Aug 2018 18:15) (21 - 24)  SpO2: 96% (27 Aug 2018 18:15) (87% - 99%)    I/Os:   I&O's Detail    27 Aug 2018 07:01  -  27 Aug 2018 18:21  --------------------------------------------------------  IN:    lactated ringers.: 60 mL  Total IN: 60 mL    OUT:    Chest Tube: 10 mL  Total OUT: 10 mL    Total NET: 50 mL          CAPILLARY BLOOD GLUCOSE          =======================  MEDICATIONS  ===================  MEDICATIONS  (STANDING):  ALBUTerol/ipratropium for Nebulization 3 milliLiter(s) Nebulizer every 6 hours  docusate sodium 100 milliGRAM(s) Oral three times a day  famotidine    Tablet 20 milliGRAM(s) Oral daily  heparin  Injectable 5000 Unit(s) SubCutaneous every 8 hours  HYDROmorphone PCA (1 mG/mL) 30 milliLiter(s) PCA Continuous PCA Continuous  lactated ringers. 1000 milliLiter(s) (30 mL/Hr) IV Continuous <Continuous>  metoprolol tartrate Injectable 2.5 milliGRAM(s) IV Push once  tiotropium 18 MICROgram(s) Capsule 1 Capsule(s) Inhalation daily    MEDICATIONS  (PRN):  HYDROmorphone PCA (1 mG/mL) Rescue Clinician Bolus 0.5 milliGRAM(s) IV Push every 15 minutes PRN for Pain Scale GREATER THAN 6  naloxone Injectable 0.1 milliGRAM(s) IV Push every 3 minutes PRN For ANY of the following changes in patient status:  A. RR LESS THAN 10 breaths per minute, B. Oxygen saturation LESS THAN 90%, C. Sedation score of 6  ondansetron Injectable 4 milliGRAM(s) IV Push every 6 hours PRN Nausea      ======================VENTILATOR SETTINGS  ==============      =================== PATIENT CARE ACCESS DEVICES ==========  Peripheral IV  Central Venous Line	R	L	IJ	Fem	SC			Placed:   Arterial Line	R	L	PT	DP	Fem	Rad	Ax	Placed:   Midline:				  Urinary Catheter, Date Placed:   Necessity of urinary, arterial, and venous catheters discussed    ======================= PHYSICAL EXAM===================  General:                         Comfortable, Awake, alert, not in any distress  Neuro:                            Moving all extremities to commands. No focal deficits	  HEENT:                           ELE/ ETT/ NGT/ trach  Respiratory:	Lungs clear on auscultation bilaterally with good aeration.                                           No rales, rhonchi, no wheezing. Effort even and unlabored.  CV:		Regular rate and rhythm. Normal S1/S2. No murmurs  Abdomen:	                     Soft,  nontender, not-distended. Bowel sounds present / absent.   Skin:		No rash.  Extremities:	Warm, no cyanosis or edema.  Palpable pulses    ============================ LABS =======================                      ===================== IMAGING STUDIES ===================  Radiology personally reviewed.    ====================ASSESSMENT AND PLAN ================      ====================== NEUROLOGY=======================  Pain control with PCA / PCEA / Tylenol IV / Toradol / Percocet  Pt is on Precedex for agitation  Pt is sedated with Propofol / Fentanyl    ==================== RESPIRATORY========================  Pt is on            L nasal canula / Face tent____% FiO2  Comfortable, no evidence of distress.  Using incentive spirometry & doing                ml  Monitor chest tube output  Chest tube to suction / water seal	    Mechanical Ventilation:    Mechanical ventilator status assessed & settings reviewed  Continue bronchodilators, pulmonary toilet  Head of bed elevation to 30-40 degrees    ====================CARDIOVASCULAR=====================  Continue hemodynamic monitoring/ telemetry  Not on any pressors  Continue cardiovascular / antihypertensive medications    ===================== RENAL ============================  Continue LR 30CC/hr      D/C IVF  Monitor I/Os, BUN/ Cr  and electrolytes  D/C Soriano      Keep Soriano for UO monitoring  BPH: Continue Flomax/ Finasteride      ==================== GASTROINTESTINAL===================  On regular diet, tolerating well  Continue GI prophylaxis with Pepcid / Protonix  Continue Zofran / Reglan for nausea - PRN	  NPO    =======================    ENDOCRIN  =====================  Glycemic monitoring  F/S with coverage  ===================HEMATOLOGIC/ONCOLOGIC =============  Monitor chest tube output. No signs of active bleeding.   Follow CBC, coags  in AM  DVT prophylaxis with SCD, sc Heparin    ========================INFECTIOUS DISEASE===============  No signs of infection. Monitor for fever / leukocytosis.  All surgical incision / chest tube  sites look clean  D/C Soriano      Pertinent clinical, laboratory, radiographic, hemodynamic, echocardiographic, respiratory data, microbiologic data and chart were reviewed and analyzed frequently throughout the course of the day and night. GI and DVT prophylaxis, glycemic control, head of bed elevation and skin care issues were addressed.  Patient seen, examined and plan discussed with CT Surgery / CTICU team during rounds.  Pt remains critically ill in imminent risk of  deterioration and requires very careful cardio- pulmonary monitoring and support.    I have spent               minutes of critical care time with this pt between            am/pm    and               am/ pm         minutes spent on total encounter; more than 50% of the visit was spent counseling and/or coordinating care by the attending physician.        ALVARO Snowden MD LIAM VELOZ          [id number]    HPI   :83 y.o. female with hx of COPD, c/o SOB, dyspnea on exertion, fatigue, weight loss of 15 lbs in last 6 months, reports occasional nonproductive cough, s/p CXR, followed by CT scan, reports multiple right lung nodules noted admitted  for lung resection.     Pre-Op Diagnosis:  Lung nodule  08/27/2018       Post-Op Dx:  Lung nodule  08/27/2018          Procedure:  Lobectomy of right lung with video-assisted thoracoscopic surgery  08/27/2018  Right upper lobectomy   Flexible bronchoscopy with bronchopulmonary lavage  08/27/2018       POD # : 0    Issues: intraop and postop SVT             postop pain            right chest tube in place        Interval/OR Events/ ROS  Pt extubated in the OR after surgery. On arrival in ICU drowsy, borderline hypoxemic on NC - required aerosol mask O2 70%  after which improved saturation to >93%. No  evidence of distress.  No Balderrama in place- voiding trial in progress.        PAST MEDICAL & SURGICAL HISTORY:  Lung nodules  COPD (Chronic Obstructive Pulmonary Disease): diagnose in ~2013  Colon Polyps  History of Osteoporosis  History of Cataract Surgery: left eye  Hip Replacement: bilateral    Allergies    No Known Allergies    Intolerances            ***VITAL SIGNS:  Vital Signs Last 24 Hrs  T(C): 36.4 (27 Aug 2018 17:45), Max: 36.6 (27 Aug 2018 12:29)  T(F): 97.6 (27 Aug 2018 17:45), Max: 97.8 (27 Aug 2018 12:29)  HR: 95 (27 Aug 2018 18:15) (95 - 111)  BP: 159/77 (27 Aug 2018 18:15) (118/51 - 169/90)  BP(mean): 95 (27 Aug 2018 18:15) (95 - 105)  RR: 24 (27 Aug 2018 18:15) (21 - 24)  SpO2: 96% (27 Aug 2018 18:15) (87% - 99%)    I/Os:   I&O's Detail    27 Aug 2018 07:01  -  27 Aug 2018 18:21  --------------------------------------------------------  IN:    lactated ringers.: 60 mL  Total IN: 60 mL    OUT:    Chest Tube: 10 mL  Total OUT: 10 mL    Total NET: 50 mL          CAPILLARY BLOOD GLUCOSE          =======================  MEDICATIONS  ===================  MEDICATIONS  (STANDING):  ALBUTerol/ipratropium for Nebulization 3 milliLiter(s) Nebulizer every 6 hours  docusate sodium 100 milliGRAM(s) Oral three times a day  famotidine    Tablet 20 milliGRAM(s) Oral daily  heparin  Injectable 5000 Unit(s) SubCutaneous every 8 hours  HYDROmorphone PCA (1 mG/mL) 30 milliLiter(s) PCA Continuous PCA Continuous  lactated ringers. 1000 milliLiter(s) (30 mL/Hr) IV Continuous <Continuous>  metoprolol tartrate Injectable 2.5 milliGRAM(s) IV Push once  tiotropium 18 MICROgram(s) Capsule 1 Capsule(s) Inhalation daily    MEDICATIONS  (PRN):  HYDROmorphone PCA (1 mG/mL) Rescue Clinician Bolus 0.5 milliGRAM(s) IV Push every 15 minutes PRN for Pain Scale GREATER THAN 6  naloxone Injectable 0.1 milliGRAM(s) IV Push every 3 minutes PRN For ANY of the following changes in patient status:  A. RR LESS THAN 10 breaths per minute, B. Oxygen saturation LESS THAN 90%, C. Sedation score of 6  ondansetron Injectable 4 milliGRAM(s) IV Push every 6 hours PRN Nausea      ======================VENTILATOR SETTINGS  ==============      =================== PATIENT CARE ACCESS DEVICES ==========  Peripheral IV  Central Venous Line	R	L	IJ	Fem	SC			Placed:   Arterial Line	R	L	PT	DP	Fem	Rad	Ax	Placed:   Midline:				  Urinary Catheter, Date Placed:   Necessity of urinary, arterial, and venous catheters discussed    ======================= PHYSICAL EXAM===================  General:                         Comfortable, Awake, alert, not in any distress  Neuro:                            Moving all extremities to commands. No focal deficits	  HEENT:                           ELE/ ETT/ NGT/ trach  Respiratory:	Lungs clear on auscultation bilaterally with good aeration.                                           No rales, rhonchi, no wheezing. Effort even and unlabored.  CV:		Regular rate and rhythm. Normal S1/S2. No murmurs  Abdomen:	                     Soft,  nontender, not-distended. Bowel sounds present / absent.   Skin:		No rash.  Extremities:	Warm, no cyanosis or edema.  Palpable pulses    ============================ LABS =======================                      ===================== IMAGING STUDIES ===================  Radiology personally reviewed.    ====================ASSESSMENT AND PLAN ================      ====================== NEUROLOGY=======================  Pain control with PCA / PCEA / Tylenol IV / Toradol / Percocet  Pt is on Precedex for agitation  Pt is sedated with Propofol / Fentanyl    ==================== RESPIRATORY========================  Pt is on            L nasal canula / Face tent____% FiO2  Comfortable, no evidence of distress.  Using incentive spirometry & doing                ml  Monitor chest tube output  Chest tube to suction / water seal	    Mechanical Ventilation:    Mechanical ventilator status assessed & settings reviewed  Continue bronchodilators, pulmonary toilet  Head of bed elevation to 30-40 degrees    ====================CARDIOVASCULAR=====================  Continue hemodynamic monitoring/ telemetry  Not on any pressors  Continue cardiovascular / antihypertensive medications    ===================== RENAL ============================  Continue LR 30CC/hr      D/C IVF  Monitor I/Os, BUN/ Cr  and electrolytes  D/C Balderrama      Keep Balderrama for UO monitoring  BPH: Continue Flomax/ Finasteride      ==================== GASTROINTESTINAL===================  On regular diet, tolerating well  Continue GI prophylaxis with Pepcid / Protonix  Continue Zofran / Reglan for nausea - PRN	  NPO    =======================    ENDOCRIN  =====================  Glycemic monitoring  F/S with coverage  ===================HEMATOLOGIC/ONCOLOGIC =============  Monitor chest tube output. No signs of active bleeding.   Follow CBC, coags  in AM  DVT prophylaxis with SCD, sc Heparin    ========================INFECTIOUS DISEASE===============  No signs of infection. Monitor for fever / leukocytosis.  All surgical incision / chest tube  sites look clean  D/C Balderrama      Pertinent clinical, laboratory, radiographic, hemodynamic, echocardiographic, respiratory data, microbiologic data and chart were reviewed and analyzed frequently throughout the course of the day and night. GI and DVT prophylaxis, glycemic control, head of bed elevation and skin care issues were addressed.  Patient seen, examined and plan discussed with CT Surgery / CTICU team during rounds.  Pt remains critically ill in imminent risk of  deterioration and requires very careful cardio- pulmonary monitoring and support.    I have spent               minutes of critical care time with this pt between            am/pm    and               am/ pm         minutes spent on total encounter; more than 50% of the visit was spent counseling and/or coordinating care by the attending physician.        ALVARO Snowden MD LIAM VELOZ          MRN-[id number]    HPI   :83 y.o. female with hx of COPD, c/o SOB, dyspnea on exertion, fatigue, weight loss of 15 lbs in last 6 months, reports occasional nonproductive cough, s/p CXR, followed by CT scan, reports multiple right lung nodules noted admitted  for lung resection.     Pre-Op Diagnosis:  Lung nodule  08/27/2018       Post-Op Dx:  Lung nodule  08/27/2018          Procedure:  Lobectomy of right lung with video-assisted thoracoscopic surgery  08/27/2018  Right upper lobectomy   Flexible bronchoscopy with bronchopulmonary lavage  08/27/2018       POD # : 0    Issues: intraop and postop SVT             postop pain            right chest tube in place        Interval/OR Events/ ROS  Pt extubated in the OR after surgery. On arrival in ICU drowsy, borderline hypoxemic on NC - required aerosol mask O2 70%  after which improved saturation to >93%. No  evidence of distress.  No Balderrama in place- voiding trial in progress.        PAST MEDICAL & SURGICAL HISTORY:  Lung nodules  COPD (Chronic Obstructive Pulmonary Disease): diagnose in ~2013  Colon Polyps  History of Osteoporosis  History of Cataract Surgery: left eye  Hip Replacement: bilateral        Home Medications:   * Patient Currently Takes Medications as of 16-Aug-2018 09:55 documented in Structured Notes  · 	Advair Diskus 250 mcg-50 mcg inhalation powder: 1 puff(s) inhaled 2 times a day  · 	Spiriva 18 mcg inhalation capsule: 1 cap(s) inhaled once a day      Allergies  No Known Allergies      ***VITAL SIGNS:  Vital Signs Last 24 Hrs  T(C): 36.4 (27 Aug 2018 17:45), Max: 36.6 (27 Aug 2018 12:29)  T(F): 97.6 (27 Aug 2018 17:45), Max: 97.8 (27 Aug 2018 12:29)  HR: 95 (27 Aug 2018 18:15) (95 - 111)  BP: 159/77 (27 Aug 2018 18:15) (118/51 - 169/90)  BP(mean): 95 (27 Aug 2018 18:15) (95 - 105)  RR: 24 (27 Aug 2018 18:15) (21 - 24)  SpO2: 96% (27 Aug 2018 18:15) (87% - 99%)    I/Os:   I&O's Detail    27 Aug 2018 07:01  -  27 Aug 2018 18:21  --------------------------------------------------------  IN:    lactated ringers.: 60 mL  Total IN: 60 mL    OUT:    Chest Tube: 10 mL  Total OUT: 10 mL    Total NET: 50 mL    =======================  MEDICATIONS  ===================  MEDICATIONS  (STANDING):  ALBUTerol/ipratropium for Nebulization 3 milliLiter(s) Nebulizer every 6 hours  docusate sodium 100 milliGRAM(s) Oral three times a day  famotidine    Tablet 20 milliGRAM(s) Oral daily  heparin  Injectable 5000 Unit(s) SubCutaneous every 8 hours  HYDROmorphone PCA (1 mG/mL) 30 milliLiter(s) PCA Continuous PCA Continuous  lactated ringers. 1000 milliLiter(s) (30 mL/Hr) IV Continuous <Continuous>  metoprolol tartrate Injectable 2.5 milliGRAM(s) IV Push once  tiotropium 18 MICROgram(s) Capsule 1 Capsule(s) Inhalation daily    MEDICATIONS  (PRN):  HYDROmorphone PCA (1 mG/mL) Rescue Clinician Bolus 0.5 milliGRAM(s) IV Push every 15 minutes PRN for Pain Scale GREATER THAN 6  naloxone Injectable 0.1 milliGRAM(s) IV Push every 3 minutes PRN For ANY of the following changes in patient status:  A. RR LESS THAN 10 breaths per minute, B. Oxygen saturation LESS THAN 90%, C. Sedation score of 6  ondansetron Injectable 4 milliGRAM(s) IV Push every 6 hours PRN Nausea      ======================VENTILATOR SETTINGS  ==============      =================== PATIENT CARE ACCESS DEVICES ==========  Peripheral IV  Central Venous Line	R	L	IJ	Fem	SC			Placed:   Arterial Line	R	L	PT	DP	Fem	Rad	Ax	Placed:   Midline:				  Urinary Catheter, Date Placed:   Necessity of urinary, arterial, and venous catheters discussed    ======================= PHYSICAL EXAM===================  General:                         Comfortable, Awake, alert, not in any distress  Neuro:                            Moving all extremities to commands. No focal deficits	  HEENT:                           ELE/ ETT/ NGT/ trach  Respiratory:	Lungs clear on auscultation bilaterally with good aeration.                                           No rales, rhonchi, no wheezing. Effort even and unlabored.  CV:		Regular rate and rhythm. Normal S1/S2. No murmurs  Abdomen:	                     Soft,  nontender, not-distended. Bowel sounds present / absent.   Skin:		No rash.  Extremities:	Warm, no cyanosis or edema.  Palpable pulses    ============================ LABS =======================                      ===================== IMAGING STUDIES ===================  Radiology personally reviewed.    ====================ASSESSMENT AND PLAN ================      ====================== NEUROLOGY=======================  Pain control with PCA / PCEA / Tylenol IV / Toradol / Percocet  Pt is on Precedex for agitation  Pt is sedated with Propofol / Fentanyl    ==================== RESPIRATORY========================  Pt is on            L nasal canula / Face tent____% FiO2  Comfortable, no evidence of distress.  Using incentive spirometry & doing                ml  Monitor chest tube output  Chest tube to suction / water seal	    Mechanical Ventilation:    Mechanical ventilator status assessed & settings reviewed  Continue bronchodilators, pulmonary toilet  Head of bed elevation to 30-40 degrees    ====================CARDIOVASCULAR=====================  Continue hemodynamic monitoring/ telemetry  Not on any pressors  Continue cardiovascular / antihypertensive medications    ===================== RENAL ============================  Continue LR 30CC/hr      D/C IVF  Monitor I/Os, BUN/ Cr  and electrolytes  D/C Balderrama      Keep Balderrama for UO monitoring  BPH: Continue Flomax/ Finasteride      ==================== GASTROINTESTINAL===================  On regular diet, tolerating well  Continue GI prophylaxis with Pepcid / Protonix  Continue Zofran / Reglan for nausea - PRN	  NPO    =======================    ENDOCRIN  =====================  Glycemic monitoring  F/S with coverage  ===================HEMATOLOGIC/ONCOLOGIC =============  Monitor chest tube output. No signs of active bleeding.   Follow CBC, coags  in AM  DVT prophylaxis with SCD, sc Heparin    ========================INFECTIOUS DISEASE===============  No signs of infection. Monitor for fever / leukocytosis.  All surgical incision / chest tube  sites look clean  D/C Balderrama      Pertinent clinical, laboratory, radiographic, hemodynamic, echocardiographic, respiratory data, microbiologic data and chart were reviewed and analyzed frequently throughout the course of the day and night. GI and DVT prophylaxis, glycemic control, head of bed elevation and skin care issues were addressed.  Patient seen, examined and plan discussed with CT Surgery / CTICU team during rounds.  Pt remains critically ill in imminent risk of  deterioration and requires very careful cardio- pulmonary monitoring and support.    I have spent               minutes of critical care time with this pt between            am/pm    and               am/ pm         minutes spent on total encounter; more than 50% of the visit was spent counseling and/or coordinating care by the attending physician.        ALVARO Snowden MD LIAM VELOZ          [id number]    HPI   :83 y.o. female with hx of COPD, c/o SOB, dyspnea on exertion, fatigue, weight loss of 15 lbs in last 6 months, reports occasional nonproductive cough, s/p CXR, followed by CT scan, reports multiple right lung nodules noted admitted  for lung resection.     Pre-Op Diagnosis:  Lung nodule  08/27/2018       Post-Op Dx:  Lung nodule  08/27/2018          Procedure:  Lobectomy of right lung with video-assisted thoracoscopic surgery  08/27/2018  Right upper lobectomy   Flexible bronchoscopy with bronchopulmonary lavage  08/27/2018       POD # : 0    Issues: intraop and postop SVT             postop pain            right chest tube in place            Hx COPD,       Interval/OR Events/ ROS  Pt extubated in the OR after surgery. On arrival in ICU drowsy, borderline hypoxemic on NC - required aerosol mask O2 70%  after which improved saturation to >93%. No  evidence of distress.  No Balderrama in place- voiding trial in progress.        PAST MEDICAL & SURGICAL HISTORY:  Lung nodules  COPD (Chronic Obstructive Pulmonary Disease): diagnose in ~2013  Colon Polyps  History of Osteoporosis  History of Cataract Surgery: left eye  Hip Replacement: bilateral        Home Medications:   * Patient Currently Takes Medications as of 16-Aug-2018 09:55 documented in Structured Notes  · 	Advair Diskus 250 mcg-50 mcg inhalation powder: 1 puff(s) inhaled 2 times a day  · 	Spiriva 18 mcg inhalation capsule: 1 cap(s) inhaled once a day      Allergies  No Known Allergies      ***VITAL SIGNS:  Vital Signs Last 24 Hrs  T(C): 36.4 (27 Aug 2018 17:45), Max: 36.6 (27 Aug 2018 12:29)  T(F): 97.6 (27 Aug 2018 17:45), Max: 97.8 (27 Aug 2018 12:29)  HR: 95 (27 Aug 2018 18:15) (95 - 111)  BP: 159/77 (27 Aug 2018 18:15) (118/51 - 169/90)  BP(mean): 95 (27 Aug 2018 18:15) (95 - 105)  RR: 24 (27 Aug 2018 18:15) (21 - 24)  SpO2: 96% (27 Aug 2018 18:15) (87% - 99%)    I/Os:   I&O's Detail    27 Aug 2018 07:01  -  27 Aug 2018 18:21  --------------------------------------------------------  IN:    lactated ringers.: 60 mL  Total IN: 60 mL    OUT:    Chest Tube: 10 mL  Total OUT: 10 mL    Total NET: 50 mL    =======================  MEDICATIONS  ===================  MEDICATIONS  (STANDING):  docusate sodium 100 milliGRAM(s) Oral three times a day  famotidine    Tablet 20 milliGRAM(s) Oral daily  heparin  Injectable 5000 Unit(s) SubCutaneous every 8 hours  HYDROmorphone PCA (1 mG/mL) 30 milliLiter(s) PCA Continuous PCA Continuous  lactated ringers. 1000 milliLiter(s) (30 mL/Hr) IV Continuous <Continuous>  metoprolol tartrate Injectable 2.5 milliGRAM(s) IV Push once  Po Protonix  Atrovent q6  Advair    MEDICATIONS  (PRN):  HYDROmorphone PCA (1 mG/mL) Rescue Clinician Bolus 0.5 milliGRAM(s) IV Push every 15 minutes PRN for Pain Scale GREATER THAN 6  naloxone Injectable 0.1 milliGRAM(s) IV Push every 3 minutes PRN For ANY of the following changes in patient status:  A. RR LESS THAN 10 breaths per minute, B. Oxygen saturation LESS THAN 90%, C. Sedation score of 6  ondansetron Injectable 4 milliGRAM(s) IV Push every 6 hours PRN Nausea  Albuterol          =================== PATIENT CARE ACCESS DEVICES ==========  Peripheral IV  Central Venous Line	R	L	IJ	Fem	SC			Placed:   Arterial Line	R	L	PT	DP	Fem	Rad	Ax	Placed:   Midline:				  Urinary Catheter, Date Placed:   Necessity of urinary, arterial, and venous catheters discussed    ======================= PHYSICAL EXAM===================  General:                         Comfortable, Awake, alert, not in any distress  Neuro:                            Moving all extremities to commands. No focal deficits	  HEENT:                           ELE/ ETT/ NGT/ trach  Respiratory:	Lungs clear on auscultation bilaterally with good aeration.                                           No rales, rhonchi, no wheezing. Effort even and unlabored.  CV:		Regular rate and rhythm. Normal S1/S2. No murmurs  Abdomen:	                     Soft,  nontender, not-distended. Bowel sounds present / absent.   Skin:		No rash.  Extremities:	Warm, no cyanosis or edema.  Palpable pulses    ============================ LABS =======================                      ===================== IMAGING STUDIES ===================  Radiology personally reviewed.    ====================ASSESSMENT AND PLAN ================      ====================== NEUROLOGY=======================  Pain control with PCA / PCEA / Tylenol IV / Toradol / Percocet  Pt is on Precedex for agitation  Pt is sedated with Propofol / Fentanyl    ==================== RESPIRATORY========================  Pt is on            L nasal canula / Face tent____% FiO2  Comfortable, no evidence of distress.  Using incentive spirometry & doing                ml  Monitor chest tube output  Chest tube to suction / water seal	    Mechanical Ventilation:    Mechanical ventilator status assessed & settings reviewed  Continue bronchodilators, pulmonary toilet  Head of bed elevation to 30-40 degrees    ====================CARDIOVASCULAR=====================  Continue hemodynamic monitoring/ telemetry  Not on any pressors  Continue cardiovascular / antihypertensive medications    ===================== RENAL ============================  Continue LR 30CC/hr      D/C IVF  Monitor I/Os, BUN/ Cr  and electrolytes  D/C Balderrama      Keep Balderrama for UO monitoring  BPH: Continue Flomax/ Finasteride      ==================== GASTROINTESTINAL===================  On regular diet, tolerating well  Continue GI prophylaxis with Pepcid / Protonix  Continue Zofran / Reglan for nausea - PRN	  NPO    =======================    ENDOCRIN  =====================  Glycemic monitoring  F/S with coverage  ===================HEMATOLOGIC/ONCOLOGIC =============  Monitor chest tube output. No signs of active bleeding.   Follow CBC, coags  in AM  DVT prophylaxis with SCD, sc Heparin    ========================INFECTIOUS DISEASE===============  No signs of infection. Monitor for fever / leukocytosis.  All surgical incision / chest tube  sites look clean  D/C Balderrama      Pertinent clinical, laboratory, radiographic, hemodynamic, echocardiographic, respiratory data, microbiologic data and chart were reviewed and analyzed frequently throughout the course of the day and night. GI and DVT prophylaxis, glycemic control, head of bed elevation and skin care issues were addressed.  Patient seen, examined and plan discussed with CT Surgery / CTICU team during rounds.  Pt remains critically ill in imminent risk of  deterioration and requires very careful cardio- pulmonary monitoring and support.    I have spent               minutes of critical care time with this pt between            am/pm    and               am/ pm         minutes spent on total encounter; more than 50% of the visit was spent counseling and/or coordinating care by the attending physician.        ALVARO Snowden MD LIAM VELOZ          MRN-[id number]    HPI   :83 y.o. female with hx of COPD, c/o SOB, dyspnea on exertion, fatigue, weight loss of 15 lbs in last 6 months, reports occasional nonproductive cough, s/p CXR, followed by CT scan, reports multiple right lung nodules noted admitted  for lung resection.     Pre-Op Diagnosis:  Lung nodule  08/27/2018       Post-Op Dx:  Lung nodule  08/27/2018          Procedure:  Lobectomy of right lung with video-assisted thoracoscopic surgery  08/27/2018  Right upper lobectomy   Flexible bronchoscopy with bronchopulmonary lavage  08/27/2018       POD # : 0    Issues: intraop and postop SVT             postop pain            right chest tube in place            Hx COPD,       Interval/OR Events/ ROS  Pt extubated in the OR after surgery. On arrival in ICU drowsy, borderline hypoxemic on NC - required aerosol mask O2 70%  after which improved saturation to >93%. No  evidence of distress.  No Balderrama in place- voiding trial in progress.        PAST MEDICAL & SURGICAL HISTORY:  Lung nodules  COPD (Chronic Obstructive Pulmonary Disease): diagnose in ~2013  Colon Polyps  History of Osteoporosis  History of Cataract Surgery: left eye  Hip Replacement: bilateral        Home Medications:   * Patient Currently Takes Medications as of 16-Aug-2018 09:55 documented in Structured Notes  · 	Advair Diskus 250 mcg-50 mcg inhalation powder: 1 puff(s) inhaled 2 times a day  · 	Spiriva 18 mcg inhalation capsule: 1 cap(s) inhaled once a day      Allergies  No Known Allergies      ***VITAL SIGNS:  Vital Signs Last 24 Hrs  T(C): 36.4 (27 Aug 2018 17:45), Max: 36.6 (27 Aug 2018 12:29)  T(F): 97.6 (27 Aug 2018 17:45), Max: 97.8 (27 Aug 2018 12:29)  HR: 95 (27 Aug 2018 18:15) (95 - 111)  BP: 159/77 (27 Aug 2018 18:15) (118/51 - 169/90)  BP(mean): 95 (27 Aug 2018 18:15) (95 - 105)  RR: 24 (27 Aug 2018 18:15) (21 - 24)  SpO2: 96% (27 Aug 2018 18:15) (87% - 99%)    I/Os:   I&O's Detail    27 Aug 2018 07:01  -  27 Aug 2018 18:21  --------------------------------------------------------  IN:    lactated ringers.: 60 mL  Total IN: 60 mL    OUT:    Chest Tube: 10 mL  Total OUT: 10 mL    Total NET: 50 mL    =======================  MEDICATIONS  ===================  MEDICATIONS  (STANDING):  docusate sodium 100 milliGRAM(s) Oral three times a day  famotidine    Tablet 20 milliGRAM(s) Oral daily  heparin  Injectable 5000 Unit(s) SubCutaneous every 8 hours  HYDROmorphone PCA (1 mG/mL) 30 milliLiter(s) PCA Continuous PCA Continuous  lactated ringers. 1000 milliLiter(s) (30 mL/Hr) IV Continuous <Continuous>  metoprolol tartrate Injectable 2.5 milliGRAM(s) IV Push once  Atrovent q6  Advair    MEDICATIONS  (PRN):  HYDROmorphone PCA (1 mG/mL) Rescue Clinician Bolus 0.5 milliGRAM(s) IV Push every 15 minutes PRN for Pain Scale GREATER THAN 6  naloxone Injectable 0.1 milliGRAM(s) IV Push every 3 minutes PRN For ANY of the following changes in patient status:  A. RR LESS THAN 10 breaths per minute, B. Oxygen saturation LESS THAN 90%, C. Sedation score of 6  ondansetron Injectable 4 milliGRAM(s) IV Push every 6 hours PRN Nausea  Albuterol    =================== PATIENT CARE ACCESS DEVICES ==========  Peripheral IV (+)  ======================= PHYSICAL EXAM===================  General:           Drowsy, wakes up to verbal stimuli, not in any distress  Neuro:              Moving all extremities to commands. No focal deficits	  HEENT:                           ELE  Respiratory:	Lungs clear on auscultation,  transmitted breath sound on right upper lung field                          No rales, rhonchi, no wheezing. Effort even and unlabored.                          Right chest tube site - clean  CV:		Regular rate and rhythm.(+) S1/S2. intermittent tachycardia - SVT @ 130- 140  Abdomen:	                     Soft,  nontender, not-distended. Bowel sounds present / absent.   Skin:		No rash.  Extremities:	Warm, no cyanosis or edema.  Palpable pulses    ============================ LABS =======================    preop  WNL    ===================== IMAGING STUDIES ===================  Radiology personally reviewed.  CXR - right chest tube in place, right upper lung field space - PTX post RUL lobectomy  ====================ASSESSMENT AND PLAN ================      ====================== NEUROLOGY=======================  Pain control with PCA / PCEA / Tylenol IV / Toradol / Percocet  Pt is on Precedex for agitation  Pt is sedated with Propofol / Fentanyl    ==================== RESPIRATORY========================  Pt is on            L nasal canula / Face tent____% FiO2  Comfortable, no evidence of distress.  Using incentive spirometry & doing                ml  Monitor chest tube output  Chest tube to suction / water seal	    Mechanical Ventilation:    Mechanical ventilator status assessed & settings reviewed  Continue bronchodilators, pulmonary toilet  Head of bed elevation to 30-40 degrees    ====================CARDIOVASCULAR=====================  Continue hemodynamic monitoring/ telemetry  Not on any pressors  Continue cardiovascular / antihypertensive medications    ===================== RENAL ============================  Continue LR 30CC/hr      D/C IVF  Monitor I/Os, BUN/ Cr  and electrolytes  D/C Balderrama      Keep Balderrama for UO monitoring  BPH: Continue Flomax/ Finasteride      ==================== GASTROINTESTINAL===================  On regular diet, tolerating well  Continue GI prophylaxis with Pepcid / Protonix  Continue Zofran / Reglan for nausea - PRN	  NPO    =======================    ENDOCRIN  =====================  Glycemic monitoring  F/S with coverage  ===================HEMATOLOGIC/ONCOLOGIC =============  Monitor chest tube output. No signs of active bleeding.   Follow CBC, coags  in AM  DVT prophylaxis with SCD, sc Heparin    ========================INFECTIOUS DISEASE===============  No signs of infection. Monitor for fever / leukocytosis.  All surgical incision / chest tube  sites look clean  D/C Balderrama      Pertinent clinical, laboratory, radiographic, hemodynamic, echocardiographic, respiratory data, microbiologic data and chart were reviewed and analyzed frequently throughout the course of the day and night. GI and DVT prophylaxis, glycemic control, head of bed elevation and skin care issues were addressed.  Patient seen, examined and plan discussed with CT Surgery / CTICU team during rounds.  Pt remains critically ill in imminent risk of  deterioration and requires very careful cardio- pulmonary monitoring and support.    I have spent               minutes of critical care time with this pt between            am/pm    and               am/ pm         minutes spent on total encounter; more than 50% of the visit was spent counseling and/or coordinating care by the attending physician.        ALVARO Snowden MD LIAM VELOZ          MRN-[id number]    HPI   :83 y.o. female with hx of COPD, c/o SOB, dyspnea on exertion, fatigue, weight loss of 15 lbs in last 6 months, reports occasional nonproductive cough, s/p CXR, followed by CT scan, reports multiple right lung nodules noted admitted  for lung resection.     Pre-Op Diagnosis:  Lung nodule  08/27/2018       Post-Op Dx:  Lung nodule  08/27/2018          Procedure:  Lobectomy of right lung with video-assisted thoracoscopic surgery  08/27/2018  Right upper lobectomy   Flexible bronchoscopy with bronchopulmonary lavage  08/27/2018       POD # : 0    Issues: intraop and postop SVT             postop pain            right chest tube in place            Hx COPD,       Interval/OR Events/ ROS  Pt extubated in the OR after surgery. On arrival in ICU drowsy, borderline hypoxemic on NC - required aerosol mask O2 70%  after which improved saturation to >93%. No  evidence of distress.  No Soriano in place- voiding trial in progress.        PAST MEDICAL & SURGICAL HISTORY:  Lung nodules  COPD (Chronic Obstructive Pulmonary Disease): diagnose in ~2013  Colon Polyps  History of Osteoporosis  History of Cataract Surgery: left eye  Hip Replacement: bilateral        Home Medications:   * Patient Currently Takes Medications as of 16-Aug-2018 09:55 documented in Structured Notes  · 	Advair Diskus 250 mcg-50 mcg inhalation powder: 1 puff(s) inhaled 2 times a day  · 	Spiriva 18 mcg inhalation capsule: 1 cap(s) inhaled once a day      Allergies  No Known Allergies      ***VITAL SIGNS:  Vital Signs Last 24 Hrs  T(C): 36.4 (27 Aug 2018 17:45), Max: 36.6 (27 Aug 2018 12:29)  T(F): 97.6 (27 Aug 2018 17:45), Max: 97.8 (27 Aug 2018 12:29)  HR: 95 (27 Aug 2018 18:15) (95 - 111)  BP: 159/77 (27 Aug 2018 18:15) (118/51 - 169/90)  BP(mean): 95 (27 Aug 2018 18:15) (95 - 105)  RR: 24 (27 Aug 2018 18:15) (21 - 24)  SpO2: 96% (27 Aug 2018 18:15) (87% - 99%)    I/Os:   I&O's Detail    27 Aug 2018 07:01  -  27 Aug 2018 18:21  --------------------------------------------------------  IN:    lactated ringers.: 60 mL  Total IN: 60 mL    OUT:    Chest Tube: 10 mL  Total OUT: 10 mL    Total NET: 50 mL    =======================  MEDICATIONS  ===================  MEDICATIONS  (STANDING):  docusate sodium 100 milliGRAM(s) Oral three times a day  famotidine    Tablet 20 milliGRAM(s) Oral daily  heparin  Injectable 5000 Unit(s) SubCutaneous every 8 hours  HYDROmorphone PCA (1 mG/mL) 30 milliLiter(s) PCA Continuous PCA Continuous  lactated ringers. 1000 milliLiter(s) (30 mL/Hr) IV Continuous <Continuous>  metoprolol tartrate Injectable 2.5 milliGRAM(s) IV Push once  Atrovent q6  Advair    MEDICATIONS  (PRN):  HYDROmorphone PCA (1 mG/mL) Rescue Clinician Bolus 0.5 milliGRAM(s) IV Push every 15 minutes PRN for Pain Scale GREATER THAN 6  naloxone Injectable 0.1 milliGRAM(s) IV Push every 3 minutes PRN For ANY of the following changes in patient status:  A. RR LESS THAN 10 breaths per minute, B. Oxygen saturation LESS THAN 90%, C. Sedation score of 6  ondansetron Injectable 4 milliGRAM(s) IV Push every 6 hours PRN Nausea  Albuterol    =================== PATIENT CARE ACCESS DEVICES ==========  Peripheral IV (+)  ======================= PHYSICAL EXAM===================  General:           Drowsy, wakes up to verbal stimuli, not in any distress  Neuro:              Moving all extremities to commands. No focal deficits	  HEENT:                           ELE  Respiratory:	Lungs clear on auscultation,  transmitted breath sound on right upper lung field                          No rales, rhonchi, no wheezing. Effort even and unlabored.                          Right chest tube site - clean, (+) air leak  CV:		Regular rate and rhythm.(+) S1/S2. intermittent tachycardia - SVT @ 130- 140  Abdomen:	                     Soft,  nontender, not-distended. Bowel sounds present / absent.   Skin:		No rash.  Extremities:	Warm, no cyanosis or edema.  Palpable pulses    ============================ LABS =======================    preop  WNL    ===================== IMAGING STUDIES ===================  Radiology personally reviewed.  CXR - right chest tube in place, right upper lung field   PTX post RUL lobectomy    ====================ASSESSMENT AND PLAN ================  83 y.o. female with hx of COPD, c/o SOB, dyspnea on exertion, fatigue, weight loss of 15 lbs in last 6 months, reports occasional nonproductive cough, s/p CXR, followed by CT scan, reports multiple right lung nodules noted admitted  for lung resection.     Pre-Op Diagnosis:  Lung nodule  08/27/2018       Post-Op Dx:  Lung nodule  08/27/2018          Procedure:  Lobectomy of right lung with video-assisted thoracoscopic surgery  08/27/2018  Right upper lobectomy   Flexible bronchoscopy with bronchopulmonary lavage  08/27/2018         Issues: intraop and postop SVT             postop pain            right chest tube in place            Hx COPD,     ====================== NEUROLOGY=======================  Pain control with PCA /Tylenol IV / Toradol    ==================== RESPIRATORY========================  Pt is on  Face tent_70_% FiO2  Comfortable, no evidence of distress.  Incentive spirometry when fully awake  Monitor chest tube output  Chest tube to suction -10cm water	  Continue bronchodilators, pulmonary toilet  Head of bed elevation to 30-40 degrees    ====================CARDIOVASCULAR=====================  Continue hemodynamic monitoring/ telemetry  May need Lopressor if recurrent tachycardia/ +- SVT    ===================== RENAL ============================  Continue LR 30CC/hr      Monitor I/Os, BUN/ Cr  and electrolytes  Voiding trial in progress ( no soriano from OR)    ==================== GASTROINTESTINAL===================  NPO until fully awake  Continue GI prophylaxis with Pepcid   Zofran / Reglan for nausea - PRN	    =======================    ENDOCRIN  =====================  Glycemic monitoring  F/S with coverage  ===================HEMATOLOGIC/ONCOLOGIC =============  Monitor chest tube output. No signs of active bleeding.   Follow CBC, coags   DVT prophylaxis with SCD, sc Heparin    ========================INFECTIOUS DISEASE===============   Monitor for fever / leukocytosis.  All surgical incision / chest tube  sites look clean        Pertinent clinical, laboratory, radiographic, hemodynamic, echocardiographic, respiratory data, microbiologic data and chart were reviewed and analyzed frequently throughout the course of the day and night. GI and DVT prophylaxis, glycemic control, head of bed elevation and skin care issues were addressed.  Patient seen, examined and plan discussed with CT Surgery / CTICU team during rounds.  Pt remains critically ill in imminent risk of  deterioration and requires very careful cardio- pulmonary monitoring and support.    I have spent     35    minutes of critical care time with this pt between    6 /pm    and 7:30  pm         minutes spent on total encounter; more than 50% of the visit was spent counseling and/or coordinating care by the attending physician.        ALVARO Snowden MD

## 2018-08-27 NOTE — BRIEF OPERATIVE NOTE - PROCEDURE
<<-----Click on this checkbox to enter Procedure Lobectomy of right lung with video-assisted thoracoscopic surgery  08/27/2018  Right upper lobectomy  Active  AMDANIE  Flexible bronchoscopy with bronchopulmonary lavage  08/27/2018    Active  MICHELLE

## 2018-08-27 NOTE — ASU PREOP CHECKLIST - 2.
middle below neck incision with steri strips in place, dry and clean middle below neck incision with steri strips in place, dry and clean (lymph node biopsy 8/23/2018)

## 2018-08-28 LAB
BUN SERPL-MCNC: 19 MG/DL — SIGNIFICANT CHANGE UP (ref 7–23)
CA-I BLD-SCNC: 1.08 MMOL/L — SIGNIFICANT CHANGE UP (ref 1.03–1.23)
CALCIUM SERPL-MCNC: 8.5 MG/DL — SIGNIFICANT CHANGE UP (ref 8.4–10.5)
CHLORIDE SERPL-SCNC: 99 MMOL/L — SIGNIFICANT CHANGE UP (ref 98–107)
CO2 SERPL-SCNC: 24 MMOL/L — SIGNIFICANT CHANGE UP (ref 22–31)
CREAT SERPL-MCNC: 0.55 MG/DL — SIGNIFICANT CHANGE UP (ref 0.5–1.3)
GLUCOSE SERPL-MCNC: 126 MG/DL — HIGH (ref 70–99)
HCT VFR BLD CALC: 41.1 % — SIGNIFICANT CHANGE UP (ref 34.5–45)
HGB BLD-MCNC: 13.6 G/DL — SIGNIFICANT CHANGE UP (ref 11.5–15.5)
MAGNESIUM SERPL-MCNC: 2.5 MG/DL — SIGNIFICANT CHANGE UP (ref 1.6–2.6)
MCHC RBC-ENTMCNC: 30.6 PG — SIGNIFICANT CHANGE UP (ref 27–34)
MCHC RBC-ENTMCNC: 33.1 % — SIGNIFICANT CHANGE UP (ref 32–36)
MCV RBC AUTO: 92.6 FL — SIGNIFICANT CHANGE UP (ref 80–100)
NON-GYNECOLOGICAL CYTOLOGY STUDY: SIGNIFICANT CHANGE UP
NRBC # FLD: 0 — SIGNIFICANT CHANGE UP
PHOSPHATE SERPL-MCNC: 4.4 MG/DL — SIGNIFICANT CHANGE UP (ref 2.5–4.5)
PLATELET # BLD AUTO: 275 K/UL — SIGNIFICANT CHANGE UP (ref 150–400)
PMV BLD: 10.2 FL — SIGNIFICANT CHANGE UP (ref 7–13)
POTASSIUM SERPL-MCNC: 4.7 MMOL/L — SIGNIFICANT CHANGE UP (ref 3.5–5.3)
POTASSIUM SERPL-SCNC: 4.7 MMOL/L — SIGNIFICANT CHANGE UP (ref 3.5–5.3)
RBC # BLD: 4.44 M/UL — SIGNIFICANT CHANGE UP (ref 3.8–5.2)
RBC # FLD: 12.9 % — SIGNIFICANT CHANGE UP (ref 10.3–14.5)
SODIUM SERPL-SCNC: 136 MMOL/L — SIGNIFICANT CHANGE UP (ref 135–145)
WBC # BLD: 14.1 K/UL — HIGH (ref 3.8–10.5)
WBC # FLD AUTO: 14.1 K/UL — HIGH (ref 3.8–10.5)

## 2018-08-28 PROCEDURE — 93306 TTE W/DOPPLER COMPLETE: CPT | Mod: 26

## 2018-08-28 PROCEDURE — 71045 X-RAY EXAM CHEST 1 VIEW: CPT | Mod: 26

## 2018-08-28 PROCEDURE — 71045 X-RAY EXAM CHEST 1 VIEW: CPT | Mod: 26,77

## 2018-08-28 PROCEDURE — 99233 SBSQ HOSP IP/OBS HIGH 50: CPT

## 2018-08-28 RX ORDER — METOPROLOL TARTRATE 50 MG
2.5 TABLET ORAL ONCE
Qty: 0 | Refills: 0 | Status: COMPLETED | OUTPATIENT
Start: 2018-08-28 | End: 2018-08-28

## 2018-08-28 RX ORDER — METOPROLOL TARTRATE 50 MG
25 TABLET ORAL EVERY 12 HOURS
Qty: 0 | Refills: 0 | Status: DISCONTINUED | OUTPATIENT
Start: 2018-08-28 | End: 2018-08-29

## 2018-08-28 RX ORDER — DILTIAZEM HCL 120 MG
5 CAPSULE, EXT RELEASE 24 HR ORAL
Qty: 125 | Refills: 0 | Status: DISCONTINUED | OUTPATIENT
Start: 2018-08-28 | End: 2018-08-29

## 2018-08-28 RX ADMIN — HYDROMORPHONE HYDROCHLORIDE 30 MILLILITER(S): 2 INJECTION INTRAMUSCULAR; INTRAVENOUS; SUBCUTANEOUS at 07:16

## 2018-08-28 RX ADMIN — Medication 100 MILLIGRAM(S): at 21:48

## 2018-08-28 RX ADMIN — Medication 1000 MILLIGRAM(S): at 22:00

## 2018-08-28 RX ADMIN — Medication 500 MICROGRAM(S): at 10:23

## 2018-08-28 RX ADMIN — SODIUM CHLORIDE 30 MILLILITER(S): 9 INJECTION, SOLUTION INTRAVENOUS at 07:15

## 2018-08-28 RX ADMIN — BUDESONIDE AND FORMOTEROL FUMARATE DIHYDRATE 2 PUFF(S): 160; 4.5 AEROSOL RESPIRATORY (INHALATION) at 16:08

## 2018-08-28 RX ADMIN — Medication 5 MG/HR: at 07:16

## 2018-08-28 RX ADMIN — SODIUM CHLORIDE 30 MILLILITER(S): 9 INJECTION, SOLUTION INTRAVENOUS at 19:08

## 2018-08-28 RX ADMIN — Medication 100 MILLIGRAM(S): at 13:04

## 2018-08-28 RX ADMIN — HEPARIN SODIUM 5000 UNIT(S): 5000 INJECTION INTRAVENOUS; SUBCUTANEOUS at 05:15

## 2018-08-28 RX ADMIN — Medication 1000 MILLIGRAM(S): at 10:15

## 2018-08-28 RX ADMIN — BUDESONIDE AND FORMOTEROL FUMARATE DIHYDRATE 2 PUFF(S): 160; 4.5 AEROSOL RESPIRATORY (INHALATION) at 22:34

## 2018-08-28 RX ADMIN — FAMOTIDINE 20 MILLIGRAM(S): 10 INJECTION INTRAVENOUS at 13:04

## 2018-08-28 RX ADMIN — Medication 400 MILLIGRAM(S): at 21:45

## 2018-08-28 RX ADMIN — Medication 1000 MILLIGRAM(S): at 01:45

## 2018-08-28 RX ADMIN — Medication 500 MICROGRAM(S): at 04:31

## 2018-08-28 RX ADMIN — Medication 400 MILLIGRAM(S): at 01:11

## 2018-08-28 RX ADMIN — Medication 400 MILLIGRAM(S): at 10:00

## 2018-08-28 RX ADMIN — ONDANSETRON 4 MILLIGRAM(S): 8 TABLET, FILM COATED ORAL at 03:29

## 2018-08-28 RX ADMIN — Medication 25 MILLIGRAM(S): at 20:37

## 2018-08-28 RX ADMIN — Medication 100 MILLIGRAM(S): at 05:15

## 2018-08-28 RX ADMIN — HEPARIN SODIUM 5000 UNIT(S): 5000 INJECTION INTRAVENOUS; SUBCUTANEOUS at 13:04

## 2018-08-28 RX ADMIN — HYDROMORPHONE HYDROCHLORIDE 30 MILLILITER(S): 2 INJECTION INTRAMUSCULAR; INTRAVENOUS; SUBCUTANEOUS at 19:08

## 2018-08-28 RX ADMIN — Medication 2.5 MILLIGRAM(S): at 02:30

## 2018-08-28 RX ADMIN — HEPARIN SODIUM 5000 UNIT(S): 5000 INJECTION INTRAVENOUS; SUBCUTANEOUS at 21:48

## 2018-08-28 RX ADMIN — Medication 500 MICROGRAM(S): at 22:40

## 2018-08-28 RX ADMIN — Medication 5 MG/HR: at 06:53

## 2018-08-28 RX ADMIN — Medication 500 MICROGRAM(S): at 16:03

## 2018-08-28 NOTE — CONSULT NOTE ADULT - ASSESSMENT
83 y.o. female with hx of COPD, no significant cardiac history presenting with intraop- postop SVT s/p Right upper lobectomy.     1. Intraop/postop SVT  s/p Right upper lobectomy, found to be in SVT   given Metoprolol 2.5mg IVP x3 with no response and started on cardizem which was up-titrated to 15mg/hr  this am pt. found to have frequent pauses > 3 seconds , likely secondary to ccb/bb  Cardizem dc'd this am, no events noted on tele   continue to hold bb/cardizem, continue to monitor tele   monitor electrolytes   if SVT recurs, recommend EP consult     2. S/p Right upper lobectomy   Care per CTICU     3. COPD  stable on inhalers     dvt ppx 83 y.o. female with hx of COPD, no significant cardiac history presenting with intraop- postop SVT s/p Right upper lobectomy.     1. Intraop/postop SVT  s/p Right upper lobectomy, found to be in SVT responsive to IV CCB   this am pt. found to have frequent pauses > 3 seconds , likely secondary to ccb  Pt now in NSR off all AVN blockers  ECHO to rule out structural heart disease  Can defer AVN blockers at this time , resume if SVT recurs  monitor electrolytes     2. S/p Right upper lobectomy   Care per CTICU     3. COPD  stable on inhalers     dvt ppx

## 2018-08-28 NOTE — CONSULT NOTE ADULT - SUBJECTIVE AND OBJECTIVE BOX
CARDIOLOGY CONSULT - Dr. Arevalo     CHIEF COMPLAINT: intraop and postop SVT    HPI: 83 y.o. female with hx of COPD, no significant cardiac history presenting with intraop- postop SVT s/p Right upper lobectomy followed by bradycardia with frequent puases >3seconds after receiving bb IV and being started on cardizem gtt. Pt. now stable. denies cp/sob/lee. C/o pain at chest tube site, on pain pump. All other ROS negative.     PAST MEDICAL & SURGICAL HISTORY:  Lung nodules  COPD (Chronic Obstructive Pulmonary Disease): diagnose in ~2013  Colon Polyps  History of Osteoporosis  History of Cataract Surgery: left eye  Hip Replacement: bilateral          PREVIOUS DIAGNOSTIC TESTING:    [ ] Echocardiogram:   [ ]  Catheterization:   [ ] Stress Test:  	    MEDICATIONS:  MEDICATIONS  (STANDING):  buDESOnide 160 MICROgram(s)/formoterol 4.5 MICROgram(s) Inhaler 2 Puff(s) Inhalation two times a day  diltiazem Infusion 5 mG/Hr (5 mL/Hr) IV Continuous <Continuous>  docusate sodium 100 milliGRAM(s) Oral three times a day  famotidine    Tablet 20 milliGRAM(s) Oral daily  heparin  Injectable 5000 Unit(s) SubCutaneous every 8 hours  HYDROmorphone PCA (1 mG/mL) 30 milliLiter(s) PCA Continuous PCA Continuous  ipratropium    for Nebulization 500 MICROGram(s) Nebulizer every 6 hours  lactated ringers. 1000 milliLiter(s) (30 mL/Hr) IV Continuous <Continuous>      FAMILY HISTORY:  Family history of liver cancer (Sibling)  Family history of lung cancer (Father): mesothelioma of lung      SOCIAL HISTORY:    [] Non-smoker  [x ]former Smoker  [ ] Alcohol    Allergies    No Known Allergies    Intolerances    	    REVIEW OF SYSTEMS:  CONSTITUTIONAL: No fever, weight loss, or fatigue  EYES: No eye pain, visual disturbances, or discharge  ENMT:  No difficulty hearing, tinnitus, vertigo; No sinus or throat pain  NECK: No pain or stiffness  RESPIRATORY: No cough, wheezing, chills or hemoptysis; +Shortness of Breath  CARDIOVASCULAR: No chest pain, palpitations, passing out, dizziness, or leg swelling  GASTROINTESTINAL: No abdominal or epigastric pain. No nausea, vomiting, or hematemesis; No diarrhea or constipation. No melena or hematochezia.  GENITOURINARY: No dysuria, frequency, hematuria, or incontinence  NEUROLOGICAL: No headaches, memory loss, loss of strength, numbness, or tremors  SKIN: No itching, burning, rashes, or lesions   	    [x] All others negative	  [ ] Unable to obtain    PHYSICAL EXAM:  T(C): 36.4 (08-28-18 @ 12:00), Max: 36.9 (08-28-18 @ 08:00)  HR: 85 (08-28-18 @ 13:00) (73 - 151)  BP: 119/49 (08-28-18 @ 13:00) (106/71 - 169/90)  RR: 16 (08-28-18 @ 13:00) (13 - 25)  SpO2: 97% (08-28-18 @ 13:00) (87% - 99%)  Wt(kg): --  I&O's Summary    27 Aug 2018 07:01  -  28 Aug 2018 07:00  --------------------------------------------------------  IN: 745 mL / OUT: 440 mL / NET: 305 mL    28 Aug 2018 07:01  -  28 Aug 2018 14:02  --------------------------------------------------------  IN: 825 mL / OUT: 120 mL / NET: 705 mL        Appearance: Normal	  Psychiatry: A & O x 3, Mood & affect appropriate  HEENT:   Normal oral mucosa, PERRL, EOMI	  Lymphatic: No lymphadenopathy  Cardiovascular: Normal S1 S2,RRR, No JVD, No murmurs  Respiratory: diminished, +R chest tube in place   Gastrointestinal:  Soft, Non-tender, + BS	  Skin: No rashes, No ecchymoses, No cyanosis	  Neurologic: Non-focal  Extremities: Normal range of motion, No clubbing, cyanosis or edema  Vascular: Peripheral pulses palpable 2+ bilaterally    TELEMETRY: NSR 80s 	    ECG:  	  RADIOLOGY: < from: Xray Chest 1 View AP/PA (08.27.18 @ 18:48) >  IMPRESSION:  Right chest tube unchanged in position.    Moderate right apical pneumothorax is slightly larger on the most recent   image and similar in size to August 27, 2018 at 6:17 PM.    Further increase in patchy right lung opacity now involving the lower   half of the right lung. This could be due to atelectasis and/or pneumonia.    Increasing right pleural effusion.    Slight increase in left basilar and retrocardiac opacity which also may   be due to a pleural effusion with passive atelectasis. Atelectasis of   other cause and/or pneumonia is not excluded.    < end of copied text >    OTHER: 	  	  LABS:	 	    CARDIAC MARKERS:                                  13.6   14.10 )-----------( 275      ( 28 Aug 2018 03:30 )             41.1     08-28    136  |  99  |  19  ----------------------------<  126<H>  4.7   |  24  |  0.55    Ca    8.5      28 Aug 2018 03:30  Phos  4.4     08-28  Mg     2.5     08-28    TPro  6.6  /  Alb  3.0<L>  /  TBili  0.7  /  DBili  x   /  AST  25  /  ALT  17  /  AlkPhos  95  08-27      proBNP:   Lipid Profile:   HgA1c:   TSH:

## 2018-08-28 NOTE — PROGRESS NOTE ADULT - SUBJECTIVE AND OBJECTIVE BOX
POST ANESTHESIA EVALUATION    83y Female POSTOP DAY 1    MENTAL STATUS: Patient participation [x  ] Awake     [  ] Arousable     [  ] Sedated    AIRWAY PATENCY: [  x] Satisfactory  [  ] Other:     Vital Signs Last 24 Hrs  T(C): 36.4 (28 Aug 2018 12:00), Max: 36.9 (28 Aug 2018 08:00)  T(F): 97.6 (28 Aug 2018 12:00), Max: 98.4 (28 Aug 2018 08:00)  HR: 89 (28 Aug 2018 16:06) (73 - 151)  BP: 117/49 (28 Aug 2018 15:00) (106/71 - 169/90)  BP(mean): 65 (28 Aug 2018 15:00) (58 - 105)  RR: 18 (28 Aug 2018 16:06) (13 - 25)  SpO2: 100% (28 Aug 2018 16:06) (87% - 100%)  I&O's Summary    27 Aug 2018 07:01  -  28 Aug 2018 07:00  --------------------------------------------------------  IN: 745 mL / OUT: 440 mL / NET: 305 mL    28 Aug 2018 07:01  -  28 Aug 2018 17:40  --------------------------------------------------------  IN: 885 mL / OUT: 140 mL / NET: 745 mL          NAUSEA/ VOMITTING:  [ x ] NONE  [  ] CONTROLLED [  ] OTHER     PAIN: [ x ] CONTROLLED WITH CURRENT REGIMEN  [  ] OTHER    [ x ] NO APPARENT ANESTHESIA COMPLICATIONS      Comments:

## 2018-08-28 NOTE — PROGRESS NOTE ADULT - SUBJECTIVE AND OBJECTIVE BOX
LIAM VELOZ          [id number]    HPI:      Procedure:  POD # :     Issues:        Interval/Overnight Events/ ROS  Pt remained hemodynamically stable overnight, not on any pressors or inotropes. OOB to chair, breathing comfortably with minimal pain. Ambulated several times . Denies pain, no SOB, no palpitations, no nausea/ no vomiting, no dizziness  A-line and soriano d/trina         PAST MEDICAL & SURGICAL HISTORY:  Lung nodules  COPD (Chronic Obstructive Pulmonary Disease): diagnose in ~2013  Colon Polyps  History of Osteoporosis  History of Cataract Surgery: left eye  Hip Replacement: bilateral    Allergies    No Known Allergies    Intolerances            ***VITAL SIGNS:  Vital Signs Last 24 Hrs  T(C): 36.4 (28 Aug 2018 12:00), Max: 36.9 (28 Aug 2018 08:00)  T(F): 97.6 (28 Aug 2018 12:00), Max: 98.4 (28 Aug 2018 08:00)  HR: 91 (28 Aug 2018 15:00) (73 - 151)  BP: 117/49 (28 Aug 2018 15:00) (106/71 - 169/90)  BP(mean): 65 (28 Aug 2018 15:00) (58 - 105)  RR: 19 (28 Aug 2018 15:00) (13 - 25)  SpO2: 96% (28 Aug 2018 15:00) (87% - 99%)    I/Os:   I&O's Detail    27 Aug 2018 07:01  -  28 Aug 2018 07:00  --------------------------------------------------------  IN:    diltiazem Infusion: 5 mL    IV PiggyBack: 200 mL    lactated ringers.: 420 mL    Oral Fluid: 120 mL  Total IN: 745 mL    OUT:    Chest Tube: 190 mL    Voided: 250 mL  Total OUT: 440 mL    Total NET: 305 mL      28 Aug 2018 07:01  -  28 Aug 2018 15:40  --------------------------------------------------------  IN:    diltiazem Infusion: 35 mL    IV PiggyBack: 100 mL    lactated ringers.: 270 mL    Oral Fluid: 480 mL  Total IN: 885 mL    OUT:    Chest Tube: 140 mL  Total OUT: 140 mL    Total NET: 745 mL          CAPILLARY BLOOD GLUCOSE          =======================  MEDICATIONS  ===================  MEDICATIONS  (STANDING):  buDESOnide 160 MICROgram(s)/formoterol 4.5 MICROgram(s) Inhaler 2 Puff(s) Inhalation two times a day  diltiazem Infusion 5 mG/Hr (5 mL/Hr) IV Continuous <Continuous>  docusate sodium 100 milliGRAM(s) Oral three times a day  famotidine    Tablet 20 milliGRAM(s) Oral daily  heparin  Injectable 5000 Unit(s) SubCutaneous every 8 hours  HYDROmorphone PCA (1 mG/mL) 30 milliLiter(s) PCA Continuous PCA Continuous  ipratropium    for Nebulization 500 MICROGram(s) Nebulizer every 6 hours  lactated ringers. 1000 milliLiter(s) (30 mL/Hr) IV Continuous <Continuous>    MEDICATIONS  (PRN):  acetaminophen  IVPB. 1000 milliGRAM(s) IV Intermittent once PRN Moderate Pain (4 - 6)  HYDROmorphone PCA (1 mG/mL) Rescue Clinician Bolus 0.5 milliGRAM(s) IV Push every 15 minutes PRN for Pain Scale GREATER THAN 6  naloxone Injectable 0.1 milliGRAM(s) IV Push every 3 minutes PRN For ANY of the following changes in patient status:  A. RR LESS THAN 10 breaths per minute, B. Oxygen saturation LESS THAN 90%, C. Sedation score of 6  ondansetron Injectable 4 milliGRAM(s) IV Push every 6 hours PRN Nausea      ======================VENTILATOR SETTINGS  ==============      =================== PATIENT CARE ACCESS DEVICES ==========  Peripheral IV  Central Venous Line	R	L	IJ	Fem	SC			Placed:   Arterial Line	R	L	PT	DP	Fem	Rad	Ax	Placed:   Midline:				  Urinary Catheter, Date Placed:   Necessity of urinary, arterial, and venous catheters discussed    ======================= PHYSICAL EXAM===================  General:                         Comfortable, Awake, alert, not in any distress  Neuro:                            Moving all extremities to commands. No focal deficits	  HEENT:                           ELE/ ETT/ NGT/ trach  Respiratory:	Lungs clear on auscultation bilaterally with good aeration.                                           No rales, rhonchi, no wheezing. Effort even and unlabored.  CV:		Regular rate and rhythm. Normal S1/S2. No murmurs  Abdomen:	                     Soft,  nontender, not-distended. Bowel sounds present / absent.   Skin:		No rash.  Extremities:	Warm, no cyanosis or edema.  Palpable pulses    ============================ LABS =======================                        13.6   14.10 )-----------( 275      ( 28 Aug 2018 03:30 )             41.1     08-28    136  |  99  |  19  ----------------------------<  126<H>  4.7   |  24  |  0.55    Ca    8.5      28 Aug 2018 03:30  Phos  4.4     08-28  Mg     2.5     08-28    TPro  6.6  /  Alb  3.0<L>  /  TBili  0.7  /  DBili  x   /  AST  25  /  ALT  17  /  AlkPhos  95  08-27    LIVER FUNCTIONS - ( 27 Aug 2018 18:40 )  Alb: 3.0 g/dL / Pro: 6.6 g/dL / ALK PHOS: 95 u/L / ALT: 17 u/L / AST: 25 u/L / GGT: x                     ===================== IMAGING STUDIES ===================  Radiology personally reviewed.    ====================ASSESSMENT AND PLAN ================      ====================== NEUROLOGY=======================  Pain control with PCA / PCEA / Tylenol IV / Toradol / Percocet  Pt is on Precedex for agitation  Pt is sedated with Propofol / Fentanyl    ==================== RESPIRATORY========================  Pt is on            L nasal canula / Face tent____% FiO2  Comfortable, no evidence of distress.  Using incentive spirometry & doing                ml  Monitor chest tube output  Chest tube to suction / water seal	    Mechanical Ventilation:    Mechanical ventilator status assessed & settings reviewed  Continue bronchodilators, pulmonary toilet  Head of bed elevation to 30-40 degrees    ====================CARDIOVASCULAR=====================  Continue hemodynamic monitoring/ telemetry  Not on any pressors  Continue cardiovascular / antihypertensive medications    ===================== RENAL ============================  Continue LR 30CC/hr      D/C IVF  Monitor I/Os, BUN/ Cr  and electrolytes  D/C Soriano      Keep Soriano for UO monitoring  BPH: Continue Flomax/ Finasteride      ==================== GASTROINTESTINAL===================  On regular diet, tolerating well  Continue GI prophylaxis with Pepcid / Protonix  Continue Zofran / Reglan for nausea - PRN	  NPO    =======================    ENDOCRIN  =====================  Glycemic monitoring  F/S with coverage  ===================HEMATOLOGIC/ONCOLOGIC =============  Monitor chest tube output. No signs of active bleeding.   Follow CBC, coags  in AM  DVT prophylaxis with SCD, sc Heparin    ========================INFECTIOUS DISEASE===============  No signs of infection. Monitor for fever / leukocytosis.  All surgical incision / chest tube  sites look clean  D/C Soriano      Pertinent clinical, laboratory, radiographic, hemodynamic, echocardiographic, respiratory data, microbiologic data and chart were reviewed and analyzed frequently throughout the course of the day and night. GI and DVT prophylaxis, glycemic control, head of bed elevation and skin care issues were addressed.  Patient seen, examined and plan discussed with CT Surgery / CTICU team during rounds.  Pt remains critically ill in imminent risk of  deterioration and requires very careful cardio- pulmonary monitoring and support.    I have spent               minutes of critical care time with this pt between            am/pm    and               am/ pm         minutes spent on total encounter; more than 50% of the visit was spent counseling and/or coordinating care by the attending physician.        ALVARO Snowden MD LIAM VELOZ          [id number]    HPI   :83 y.o. female with hx of COPD, c/o SOB, dyspnea on exertion, fatigue, weight loss of 15 lbs in last 6 months, reports occasional nonproductive cough, s/p CXR, followed by CT scan, reports multiple right lung nodules noted admitted  for lung resection.     Pre-Op Diagnosis:  Lung nodule  08/27/2018       Post-Op Dx:  Lung nodule  08/27/2018          Procedure:  Lobectomy of right lung with video-assisted thoracoscopic surgery  08/27/2018  Right upper lobectomy   Flexible bronchoscopy with bronchopulmonary lavage  08/27/2018       POD # :1    Issues: intraop and postop SVT             postop pain            right chest tube in place            Hx COPD,     Interval/Overnight Events/ ROS  SVT with PAC overnight - received Iv Lopressor 2.5 mg  x 3  and CArdizem 10 mg IVP, then started on Cardizem infusion. Early this AM noted pauses on telemetry without clinical symptoms. Cardizem drip d/trina, pt remains in NSR for past few hours. Cardiology and EP consulted .        PAST MEDICAL & SURGICAL HISTORY:  Lung nodules  COPD (Chronic Obstructive Pulmonary Disease): diagnose in ~2013  Colon Polyps  History of Osteoporosis  History of Cataract Surgery: left eye  Hip Replacement: bilateral      Home Medications:   * Patient Currently Takes Medications as of 16-Aug-2018 09:55 documented in Structured Notes  · 	Advair Diskus 250 mcg-50 mcg inhalation powder: 1 puff(s) inhaled 2 times a day  · 	Spiriva 18 mcg inhalation capsule: 1 cap(s) inhaled once a day      Allergies  No Known Allergies        ***VITAL SIGNS:  Vital Signs Last 24 Hrs  T(C): 36.4 (28 Aug 2018 12:00), Max: 36.9 (28 Aug 2018 08:00)  T(F): 97.6 (28 Aug 2018 12:00), Max: 98.4 (28 Aug 2018 08:00)  HR: 91 (28 Aug 2018 15:00) (73 - 151)  BP: 117/49 (28 Aug 2018 15:00) (106/71 - 169/90)  BP(mean): 65 (28 Aug 2018 15:00) (58 - 105)  RR: 19 (28 Aug 2018 15:00) (13 - 25)  SpO2: 96% (28 Aug 2018 15:00) (87% - 99%)    I/Os:   I&O's Detail    27 Aug 2018 07:01  -  28 Aug 2018 07:00  --------------------------------------------------------  IN:    diltiazem Infusion: 5 mL    IV PiggyBack: 200 mL    lactated ringers.: 420 mL    Oral Fluid: 120 mL  Total IN: 745 mL    OUT:    Chest Tube: 190 mL    Voided: 250 mL  Total OUT: 440 mL    Total NET: 305 mL      28 Aug 2018 07:01  -  28 Aug 2018 15:40  --------------------------------------------------------  IN:    diltiazem Infusion: 35 mL    IV PiggyBack: 100 mL    lactated ringers.: 270 mL    Oral Fluid: 480 mL  Total IN: 885 mL    OUT:    Chest Tube: 140 mL  Total OUT: 140 mL    Total NET: 745 mL  =======================  MEDICATIONS  ===================  MEDICATIONS  (STANDING):  buDESOnide 160 MICROgram(s)/formoterol 4.5 MICROgram(s) Inhaler 2 Puff(s) Inhalation two times a day  diltiazem Infusion 5 mG/Hr (5 mL/Hr) IV Continuous <Continuous>- OFF  docusate sodium 100 milliGRAM(s) Oral three times a day  famotidine    Tablet 20 milliGRAM(s) Oral daily  heparin  Injectable 5000 Unit(s) SubCutaneous every 8 hours  HYDROmorphone PCA (1 mG/mL) 30 milliLiter(s) PCA Continuous PCA Continuous  ipratropium    for Nebulization 500 MICROGram(s) Nebulizer every 6 hours  lactated ringers. 1000 milliLiter(s) (30 mL/Hr) IV Continuous <Continuous>    MEDICATIONS  (PRN):  acetaminophen  IVPB. 1000 milliGRAM(s) IV Intermittent once PRN Moderate Pain (4 - 6)  HYDROmorphone PCA (1 mG/mL) Rescue Clinician Bolus 0.5 milliGRAM(s) IV Push every 15 minutes PRN for Pain Scale GREATER THAN 6  naloxone Injectable 0.1 milliGRAM(s) IV Push every 3 minutes PRN For ANY of the following changes in patient status:  A. RR LESS THAN 10 breaths per minute, B. Oxygen saturation LESS THAN 90%, C. Sedation score of 6  ondansetron Injectable 4 milliGRAM(s) IV Push every 6 hours PRN Nausea    =================== PATIENT CARE ACCESS DEVICES ==========  Peripheral IV (+)    ======================= PHYSICAL EXAM===================  General:             Comfortable, Awake, alert, not in any distress  Neuro:              Moving all extremities to commands. No focal deficits	  HEENT:                           ELE  Respiratory:	Lungs clear on auscultation bilaterally with good aeration.                           No rales, rhonchi, no wheezing. Effort even and unlabored.                          Right chest tube site - clean; no air leak today  CV:		Regular rate and rhythm. Normal S1/S2.  Abdomen:         Soft,  nontender, not-distended. Bowel sounds present  Skin:		No rash.  Extremities:	Warm, no cyanosis or edema.  Palpable pulses    ============================ LABS =======================                        13.6   14.10 )-----------( 275      ( 28 Aug 2018 03:30 )             41.1     08-28    136  |  99  |  19  ----------------------------<  126<H>  4.7   |  24  |  0.55    Ca    8.5      28 Aug 2018 03:30  Phos  4.4     08-28  Mg     2.5     08-28    TPro  6.6  /  Alb  3.0<L>  /  TBili  0.7  /  DBili  x   /  AST  25  /  ALT  17  /  AlkPhos  95  08-27    LIVER FUNCTIONS - ( 27 Aug 2018 18:40 )  Alb: 3.0 g/dL / Pro: 6.6 g/dL / ALK PHOS: 95 u/L / ALT: 17 u/L / AST: 25 u/L / GGT: x           ===================== IMAGING STUDIES ===================  Radiology personally reviewed.  < from: Xray Chest 1 View- PORTABLE-Routine (08.28.18 @ 07:50) >    INTERPRETATION:  TIME OF EXAM: August 27, 2018 at 6:17 PM.    CLINICAL INFORMATION: Status postright upper lobectomy for mass.    COMPARISON:  August 23, 2018    INTERPRETATION:   Heart size and the mediastinum cannot be accurately evaluated on this   projection.  The thoracic aorta is calcified.  There is right lung postsurgical change. The tip of a right chest tube   overlies the medial apex. There is a moderate-sized right apical   pneumothorax.  There is right basilar opacity with obscuration of the right   hemidiaphragm which may be due to a combination of pleural effusion with   passive atelectasis and atelectasis of other cause.  There is left basilar and retrocardiac opacity with obscuration of the   left hemidiaphragm.    AP portable chest x-ray from August 27, 2018 at 7:32 PM:    CLINICAL INFORMATION: Right chest tube placed to suction. Follow-up of   right pneumothorax.    Right chest tube unchanged in position. Moderate size right pneumothorax   is slightly smaller. Right pleural effusion appears decreased. Patchy   right lower lung opacity and left retrocardiac and basilar opacity is not   significantly changed.    AP portable chest x-ray from August 28, 2018 at 7:22 AM:    Right chest tube unchanged in position.  Moderate right apical pneumothorax is slightly larger. Similar in size to   original image. Superior pleural margin is just below the posterior right   sixth rib. Right subcutaneous emphysema seen.  There is further increase in patchy right lung opacity now involving the   lower half of the right lung.  Right pleural effusion is larger.  Slight increase in left basilar and retrocardiac opacity.      IMPRESSION:  Right chest tube unchanged in position.    Moderate right apical pneumothorax is slightly larger on the most recent   image and similar in size to August 27, 2018 at 6:17 PM.    Further increase in patchy right lung opacity now involving the lower   half of the right lung. This could be due to atelectasis and/or pneumonia.    Increasing right pleural effusion.    Slight increase in left basilar and retrocardiac opacity which also may   be due to a pleural effusion with passive atelectasis. Atelectasis of   other cause and/or pneumonia is not excluded.    < end of copied text >    ====================ASSESSMENT AND PLAN ================  83 y.o. female with hx of COPD, c/o SOB, dyspnea on exertion, fatigue, weight loss of 15 lbs in last 6 months, reports occasional nonproductive cough, s/p CXR, followed by CT scan, reports multiple right lung nodules noted admitted  for lung resection.     Pre-Op Diagnosis:  Lung nodule  08/27/2018       Post-Op Dx:  Lung nodule  08/27/2018          Procedure:  Lobectomy of right lung with video-assisted thoracoscopic surgery  08/27/2018  Right upper lobectomy   Flexible bronchoscopy with bronchopulmonary lavage  08/27/2018         Issues: intraop and postop SVT             postop pain            right chest tube in place            Hx COPD,         ====================== NEUROLOGY=======================  Pain control with PCA / Tylenol IV / Toradol       ==================== RESPIRATORY========================  Pt is on     2       L nasal canula  Comfortable, no evidence of distress.  Using incentive spirometry & doing  @ 500-750  ml  Monitor chest tube output  Chest tube to suction -10cm H2O  Continue bronchodilators, pulmonary toilet  buDESOnide 160 MICROgram(s)/formoterol 4.5 MICROgram(s) Inhaler 2 Puff(s) Inhalation two times a day  ipratropium    for Nebulization 500 MICROGram(s) Nebulizer every 6 hours  Head of bed elevation to 30-40 degrees  OOB, ambulation as tolerated    ====================CARDIOVASCULAR=====================  Continue hemodynamic monitoring/ telemetry  F/up EP consults  Will keep  external pacing pads in place for now    ===================== RENAL ============================  Continue LR 30CC/hr   Monitor I/Os, BUN/ Cr  and electrolytes    ==================== GASTROINTESTINAL===================  On regular diet, tolerating well  Continue GI prophylaxis with Pepcid   Continue Zofran  for nausea - PRN	    =======================    ENDOCRIN  =====================  Glycemic monitoring  F/S with coverage    ===================HEMATOLOGIC/ONCOLOGIC =============  Monitor chest tube output. No signs of active bleeding.   Follow CBC, coags  in AM  DVT prophylaxis with SCD, sc Heparin    ========================INFECTIOUS DISEASE===============  No signs of infection. Monitor for fever / leukocytosis.  All surgical incision / chest tube  sites look clean        Pertinent clinical, laboratory, radiographic, hemodynamic, echocardiographic, respiratory data, microbiologic data and chart were reviewed and analyzed frequently throughout the course of the day and night. GI and DVT prophylaxis, glycemic control, head of bed elevation and skin care issues were addressed.  Patient seen, examined and plan discussed with CT Surgery / CTICU team during rounds.  Pt remains critically ill in imminent risk of  deterioration and requires very careful cardio- pulmonary monitoring and support.    I have spent     40   minutes of critical care time with this pt between  7   am   and   7:30  pm         minutes spent on total encounter; more than 50% of the visit was spent counseling and/or coordinating care by the attending physician.        ALVARO Snowden MD LIAM VELOZ          [id number]    HPI   :83 y.o. female with hx of COPD, c/o SOB, dyspnea on exertion, fatigue, weight loss of 15 lbs in last 6 months, reports occasional nonproductive cough, s/p CXR, followed by CT scan, reports multiple right lung nodules noted admitted  for lung resection.     Pre-Op Diagnosis:  Lung nodule  08/27/2018       Post-Op Dx:  Lung nodule  08/27/2018          Procedure:  Lobectomy of right lung with video-assisted thoracoscopic surgery  08/27/2018  Right upper lobectomy   Flexible bronchoscopy with bronchopulmonary lavage  08/27/2018       POD # :1    Issues: intraop and postop SVT             postop pain            right chest tube in place            Hx COPD,     Interval/Overnight Events/ ROS  SVT with PAC overnight - received Iv Lopressor 2.5 mg  x 3  and CArdizem 10 mg IVP, then started on Cardizem infusion. Early this AM noted pauses on telemetry without clinical symptoms. Cardizem drip d/trina, pt remains in NSR for past few hours. Cardiology and EP consulted .        PAST MEDICAL & SURGICAL HISTORY:  Lung nodules  COPD (Chronic Obstructive Pulmonary Disease): diagnose in ~2013  Colon Polyps  History of Osteoporosis  History of Cataract Surgery: left eye  Hip Replacement: bilateral      Home Medications:   * Patient Currently Takes Medications as of 16-Aug-2018 09:55 documented in Structured Notes  · 	Advair Diskus 250 mcg-50 mcg inhalation powder: 1 puff(s) inhaled 2 times a day  · 	Spiriva 18 mcg inhalation capsule: 1 cap(s) inhaled once a day      Allergies  No Known Allergies        ***VITAL SIGNS:  Vital Signs Last 24 Hrs  T(C): 36.4 (28 Aug 2018 12:00), Max: 36.9 (28 Aug 2018 08:00)  T(F): 97.6 (28 Aug 2018 12:00), Max: 98.4 (28 Aug 2018 08:00)  HR: 91 (28 Aug 2018 15:00) (73 - 151)  BP: 117/49 (28 Aug 2018 15:00) (106/71 - 169/90)  BP(mean): 65 (28 Aug 2018 15:00) (58 - 105)  RR: 19 (28 Aug 2018 15:00) (13 - 25)  SpO2: 96% (28 Aug 2018 15:00) (87% - 99%)    I/Os:   I&O's Detail    27 Aug 2018 07:01  -  28 Aug 2018 07:00  --------------------------------------------------------  IN:    diltiazem Infusion: 5 mL    IV PiggyBack: 200 mL    lactated ringers.: 420 mL    Oral Fluid: 120 mL  Total IN: 745 mL    OUT:    Chest Tube: 190 mL    Voided: 250 mL  Total OUT: 440 mL    Total NET: 305 mL      28 Aug 2018 07:01  -  28 Aug 2018 15:40  --------------------------------------------------------  IN:    diltiazem Infusion: 35 mL    IV PiggyBack: 100 mL    lactated ringers.: 270 mL    Oral Fluid: 480 mL  Total IN: 885 mL    OUT:    Chest Tube: 140 mL  Total OUT: 140 mL    Total NET: 745 mL  =======================  MEDICATIONS  ===================  MEDICATIONS  (STANDING):  buDESOnide 160 MICROgram(s)/formoterol 4.5 MICROgram(s) Inhaler 2 Puff(s) Inhalation two times a day  diltiazem Infusion 5 mG/Hr (5 mL/Hr) IV Continuous <Continuous>- OFF  docusate sodium 100 milliGRAM(s) Oral three times a day  famotidine    Tablet 20 milliGRAM(s) Oral daily  heparin  Injectable 5000 Unit(s) SubCutaneous every 8 hours  HYDROmorphone PCA (1 mG/mL) 30 milliLiter(s) PCA Continuous PCA Continuous  ipratropium    for Nebulization 500 MICROGram(s) Nebulizer every 6 hours  lactated ringers. 1000 milliLiter(s) (30 mL/Hr) IV Continuous <Continuous>    MEDICATIONS  (PRN):  acetaminophen  IVPB. 1000 milliGRAM(s) IV Intermittent once PRN Moderate Pain (4 - 6)  HYDROmorphone PCA (1 mG/mL) Rescue Clinician Bolus 0.5 milliGRAM(s) IV Push every 15 minutes PRN for Pain Scale GREATER THAN 6  naloxone Injectable 0.1 milliGRAM(s) IV Push every 3 minutes PRN For ANY of the following changes in patient status:  A. RR LESS THAN 10 breaths per minute, B. Oxygen saturation LESS THAN 90%, C. Sedation score of 6  ondansetron Injectable 4 milliGRAM(s) IV Push every 6 hours PRN Nausea    =================== PATIENT CARE ACCESS DEVICES ==========  Peripheral IV (+)    ======================= PHYSICAL EXAM===================  General:             Comfortable, Awake, alert, not in any distress  Neuro:              Moving all extremities to commands. No focal deficits	  HEENT:                           ELE  Respiratory:	Lungs clear on auscultation bilaterally with good aeration.                           No rales, rhonchi, no wheezing. Effort even and unlabored.                          Right chest tube site - clean; no air leak today  CV:		Regular rate and rhythm. Normal S1/S2.  Abdomen:         Soft,  nontender, not-distended. Bowel sounds present  Skin:		No rash.  Extremities:	Warm, no cyanosis or edema.  Palpable pulses    ============================ LABS =======================                        13.6   14.10 )-----------( 275      ( 28 Aug 2018 03:30 )             41.1     08-28    136  |  99  |  19  ----------------------------<  126<H>  4.7   |  24  |  0.55    Ca    8.5      28 Aug 2018 03:30  Phos  4.4     08-28  Mg     2.5     08-28    TPro  6.6  /  Alb  3.0<L>  /  TBili  0.7  /  DBili  x   /  AST  25  /  ALT  17  /  AlkPhos  95  08-27    LIVER FUNCTIONS - ( 27 Aug 2018 18:40 )  Alb: 3.0 g/dL / Pro: 6.6 g/dL / ALK PHOS: 95 u/L / ALT: 17 u/L / AST: 25 u/L / GGT: x           ===================== IMAGING STUDIES ===================  Radiology personally reviewed.  < from: Xray Chest 1 View- PORTABLE-Routine (08.28.18 @ 07:50) >    INTERPRETATION:  TIME OF EXAM: August 27, 2018 at 6:17 PM.    CLINICAL INFORMATION: Status postright upper lobectomy for mass.    COMPARISON:  August 23, 2018    INTERPRETATION:   Heart size and the mediastinum cannot be accurately evaluated on this   projection.  The thoracic aorta is calcified.  There is right lung postsurgical change. The tip of a right chest tube   overlies the medial apex. There is a moderate-sized right apical   pneumothorax.  There is right basilar opacity with obscuration of the right   hemidiaphragm which may be due to a combination of pleural effusion with   passive atelectasis and atelectasis of other cause.  There is left basilar and retrocardiac opacity with obscuration of the   left hemidiaphragm.    AP portable chest x-ray from August 27, 2018 at 7:32 PM:    CLINICAL INFORMATION: Right chest tube placed to suction. Follow-up of   right pneumothorax.    Right chest tube unchanged in position. Moderate size right pneumothorax   is slightly smaller. Right pleural effusion appears decreased. Patchy   right lower lung opacity and left retrocardiac and basilar opacity is not   significantly changed.    AP portable chest x-ray from August 28, 2018 at 7:22 AM:    Right chest tube unchanged in position.  Moderate right apical pneumothorax is slightly larger. Similar in size to   original image. Superior pleural margin is just below the posterior right   sixth rib. Right subcutaneous emphysema seen.  There is further increase in patchy right lung opacity now involving the   lower half of the right lung.  Right pleural effusion is larger.  Slight increase in left basilar and retrocardiac opacity.      IMPRESSION:  Right chest tube unchanged in position.    Moderate right apical pneumothorax is slightly larger on the most recent   image and similar in size to August 27, 2018 at 6:17 PM.    Further increase in patchy right lung opacity now involving the lower   half of the right lung. This could be due to atelectasis and/or pneumonia.    Increasing right pleural effusion.    Slight increase in left basilar and retrocardiac opacity which also may   be due to a pleural effusion with passive atelectasis. Atelectasis of   other cause and/or pneumonia is not excluded.    < end of copied text >        < from: Transthoracic Echocardiogram (08.28.18 @ 15:54) >  CONCLUSIONS:  1. Mitral annular calcification, otherwise normal mitral  valve. Minimal mitral regurgitation.  2. Aortic valve leaflet morphology not well visualized.  Mild aortic regurgitation.  3. Normal left ventricular internal dimensions and wall  thicknesses.  4. Endocardium not well visualized; grossly normal left  ventricular systolic function.  5. Unable to accurately evaluate right ventricular size or  systolic function.  6. Estimated right ventricular systolic pressure equals 62  mm Hg, assuming right atrial pressure equals 10 mm Hg,  consistent with severe pulmonary hypertension.    < end of copied text >    ====================ASSESSMENT AND PLAN ================  83 y.o. female with hx of COPD, c/o SOB, dyspnea on exertion, fatigue, weight loss of 15 lbs in last 6 months, reports occasional nonproductive cough, s/p CXR, followed by CT scan, reports multiple right lung nodules noted admitted  for lung resection.     Pre-Op Diagnosis:  Lung nodule  08/27/2018       Post-Op Dx:  Lung nodule  08/27/2018          Procedure:  Lobectomy of right lung with video-assisted thoracoscopic surgery  08/27/2018  Right upper lobectomy   Flexible bronchoscopy with bronchopulmonary lavage  08/27/2018         Issues: intraop and postop SVT             postop pain            right chest tube in place            Hx COPD,         ====================== NEUROLOGY=======================  Pain control with PCA / Tylenol IV / Toradol       ==================== RESPIRATORY========================  Pt is on     2       L nasal canula  Comfortable, no evidence of distress.  Using incentive spirometry & doing  @ 500-750  ml  Monitor chest tube output  Chest tube to suction -10cm H2O  Continue bronchodilators, pulmonary toilet  buDESOnide 160 MICROgram(s)/formoterol 4.5 MICROgram(s) Inhaler 2 Puff(s) Inhalation two times a day  ipratropium    for Nebulization 500 MICROGram(s) Nebulizer every 6 hours  Head of bed elevation to 30-40 degrees  OOB, ambulation as tolerated    ====================CARDIOVASCULAR=====================  Continue hemodynamic monitoring/ telemetry  F/up EP consults  Will keep  external pacing pads in place for now    ===================== RENAL ============================  Continue LR 30CC/hr   Monitor I/Os, BUN/ Cr  and electrolytes    ==================== GASTROINTESTINAL===================  On regular diet, tolerating well  Continue GI prophylaxis with Pepcid   Continue Zofran  for nausea - PRN	    =======================    ENDOCRIN  =====================  Glycemic monitoring  F/S with coverage    ===================HEMATOLOGIC/ONCOLOGIC =============  Monitor chest tube output. No signs of active bleeding.   Follow CBC, coags  in AM  DVT prophylaxis with SCD, sc Heparin    ========================INFECTIOUS DISEASE===============  No signs of infection. Monitor for fever / leukocytosis.  All surgical incision / chest tube  sites look clean        Pertinent clinical, laboratory, radiographic, hemodynamic, echocardiographic, respiratory data, microbiologic data and chart were reviewed and analyzed frequently throughout the course of the day and night. GI and DVT prophylaxis, glycemic control, head of bed elevation and skin care issues were addressed.  Patient seen, examined and plan discussed with CT Surgery / CTICU team during rounds.  Pt remains critically ill in imminent risk of  deterioration and requires very careful cardio- pulmonary monitoring and support.    I have spent     40   minutes of critical care time with this pt between  7   am   and   7:30  pm         minutes spent on total encounter; more than 50% of the visit was spent counseling and/or coordinating care by the attending physician.        ALVARO Snowden MD LIAM VELOZ          MRN-[id number]    HPI   :83 y.o. female with hx of COPD, c/o SOB, dyspnea on exertion, fatigue, weight loss of 15 lbs in last 6 months, reports occasional nonproductive cough, s/p CXR, followed by CT scan, reports multiple right lung nodules noted admitted  for lung resection.     Pre-Op Diagnosis:  Lung nodule  08/27/2018       Post-Op Dx:  Lung nodule  08/27/2018          Procedure:  Lobectomy of right lung with video-assisted thoracoscopic surgery  08/27/2018  Right upper lobectomy   Flexible bronchoscopy with bronchopulmonary lavage  08/27/2018       POD # :1    Issues: intraop and postop SVT             postop pain            right chest tube in place            Hx COPD,     Interval/Overnight Events/ ROS  SVT with PAC overnight - received Iv Lopressor 2.5 mg  x 3  and CArdizem 10 mg IVP, then started on Cardizem infusion. Early this AM noted pauses on telemetry without clinical symptoms. Cardizem drip d/trina, pt remains in NSR for past few hours. Cardiology and EP consulted .        PAST MEDICAL & SURGICAL HISTORY:  Lung nodules  COPD (Chronic Obstructive Pulmonary Disease): diagnose in ~2013  Colon Polyps  History of Osteoporosis  History of Cataract Surgery: left eye  Hip Replacement: bilateral      Home Medications:   * Patient Currently Takes Medications as of 16-Aug-2018 09:55 documented in Structured Notes  · 	Advair Diskus 250 mcg-50 mcg inhalation powder: 1 puff(s) inhaled 2 times a day  · 	Spiriva 18 mcg inhalation capsule: 1 cap(s) inhaled once a day      Allergies  No Known Allergies        ***VITAL SIGNS:  Vital Signs Last 24 Hrs  T(C): 36.4 (28 Aug 2018 12:00), Max: 36.9 (28 Aug 2018 08:00)  T(F): 97.6 (28 Aug 2018 12:00), Max: 98.4 (28 Aug 2018 08:00)  HR: 91 (28 Aug 2018 15:00) (73 - 151)  BP: 117/49 (28 Aug 2018 15:00) (106/71 - 169/90)  BP(mean): 65 (28 Aug 2018 15:00) (58 - 105)  RR: 19 (28 Aug 2018 15:00) (13 - 25)  SpO2: 96% (28 Aug 2018 15:00) (87% - 99%)    I/Os:   I&O's Detail    27 Aug 2018 07:01  -  28 Aug 2018 07:00  --------------------------------------------------------  IN:    diltiazem Infusion: 5 mL    IV PiggyBack: 200 mL    lactated ringers.: 420 mL    Oral Fluid: 120 mL  Total IN: 745 mL    OUT:    Chest Tube: 190 mL    Voided: 250 mL  Total OUT: 440 mL    Total NET: 305 mL      28 Aug 2018 07:01  -  28 Aug 2018 15:40  --------------------------------------------------------  IN:    diltiazem Infusion: 35 mL    IV PiggyBack: 100 mL    lactated ringers.: 270 mL    Oral Fluid: 480 mL  Total IN: 885 mL    OUT:    Chest Tube: 140 mL  Total OUT: 140 mL    Total NET: 745 mL  =======================  MEDICATIONS  ===================  MEDICATIONS  (STANDING):  buDESOnide 160 MICROgram(s)/formoterol 4.5 MICROgram(s) Inhaler 2 Puff(s) Inhalation two times a day  diltiazem Infusion 5 mG/Hr (5 mL/Hr) IV Continuous <Continuous>- OFF  docusate sodium 100 milliGRAM(s) Oral three times a day  famotidine    Tablet 20 milliGRAM(s) Oral daily  heparin  Injectable 5000 Unit(s) SubCutaneous every 8 hours  HYDROmorphone PCA (1 mG/mL) 30 milliLiter(s) PCA Continuous PCA Continuous  ipratropium    for Nebulization 500 MICROGram(s) Nebulizer every 6 hours  lactated ringers. 1000 milliLiter(s) (30 mL/Hr) IV Continuous <Continuous>    MEDICATIONS  (PRN):  acetaminophen  IVPB. 1000 milliGRAM(s) IV Intermittent once PRN Moderate Pain (4 - 6)  HYDROmorphone PCA (1 mG/mL) Rescue Clinician Bolus 0.5 milliGRAM(s) IV Push every 15 minutes PRN for Pain Scale GREATER THAN 6  naloxone Injectable 0.1 milliGRAM(s) IV Push every 3 minutes PRN For ANY of the following changes in patient status:  A. RR LESS THAN 10 breaths per minute, B. Oxygen saturation LESS THAN 90%, C. Sedation score of 6  ondansetron Injectable 4 milliGRAM(s) IV Push every 6 hours PRN Nausea    =================== PATIENT CARE ACCESS DEVICES ==========  Peripheral IV (+)    ======================= PHYSICAL EXAM===================  General:             Comfortable, Awake, alert, not in any distress  Neuro:              Moving all extremities to commands. No focal deficits	  HEENT:                           ELE  Respiratory:	Lungs clear on auscultation bilaterally with good aeration.                           No rales, rhonchi, no wheezing. Effort even and unlabored.                          Right chest tube site - clean; no air leak today  CV:		Regular rate and rhythm. Normal S1/S2.  Abdomen:         Soft,  nontender, not-distended. Bowel sounds present  Skin:		No rash.  Extremities:	Warm, no cyanosis or edema.  Palpable pulses    ============================ LABS =======================                        13.6   14.10 )-----------( 275      ( 28 Aug 2018 03:30 )             41.1     08-28    136  |  99  |  19  ----------------------------<  126<H>  4.7   |  24  |  0.55    Ca    8.5      28 Aug 2018 03:30  Phos  4.4     08-28  Mg     2.5     08-28    TPro  6.6  /  Alb  3.0<L>  /  TBili  0.7  /  DBili  x   /  AST  25  /  ALT  17  /  AlkPhos  95  08-27    LIVER FUNCTIONS - ( 27 Aug 2018 18:40 )  Alb: 3.0 g/dL / Pro: 6.6 g/dL / ALK PHOS: 95 u/L / ALT: 17 u/L / AST: 25 u/L / GGT: x           ===================== IMAGING STUDIES ===================  Radiology personally reviewed.  < from: Xray Chest 1 View- PORTABLE-Routine (08.28.18 @ 07:50) >    INTERPRETATION:  TIME OF EXAM: August 27, 2018 at 6:17 PM.    CLINICAL INFORMATION: Status postright upper lobectomy for mass.    COMPARISON:  August 23, 2018    INTERPRETATION:   Heart size and the mediastinum cannot be accurately evaluated on this   projection.  The thoracic aorta is calcified.  There is right lung postsurgical change. The tip of a right chest tube   overlies the medial apex. There is a moderate-sized right apical   pneumothorax.  There is right basilar opacity with obscuration of the right   hemidiaphragm which may be due to a combination of pleural effusion with   passive atelectasis and atelectasis of other cause.  There is left basilar and retrocardiac opacity with obscuration of the   left hemidiaphragm.    AP portable chest x-ray from August 27, 2018 at 7:32 PM:    CLINICAL INFORMATION: Right chest tube placed to suction. Follow-up of   right pneumothorax.    Right chest tube unchanged in position. Moderate size right pneumothorax   is slightly smaller. Right pleural effusion appears decreased. Patchy   right lower lung opacity and left retrocardiac and basilar opacity is not   significantly changed.    AP portable chest x-ray from August 28, 2018 at 7:22 AM:    Right chest tube unchanged in position.  Moderate right apical pneumothorax is slightly larger. Similar in size to   original image. Superior pleural margin is just below the posterior right   sixth rib. Right subcutaneous emphysema seen.  There is further increase in patchy right lung opacity now involving the   lower half of the right lung.  Right pleural effusion is larger.  Slight increase in left basilar and retrocardiac opacity.      IMPRESSION:  Right chest tube unchanged in position.    Moderate right apical pneumothorax is slightly larger on the most recent   image and similar in size to August 27, 2018 at 6:17 PM.    Further increase in patchy right lung opacity now involving the lower   half of the right lung. This could be due to atelectasis and/or pneumonia.    Increasing right pleural effusion.    Slight increase in left basilar and retrocardiac opacity which also may   be due to a pleural effusion with passive atelectasis. Atelectasis of   other cause and/or pneumonia is not excluded.    < end of copied text >        < from: Transthoracic Echocardiogram (08.28.18 @ 15:54) >  CONCLUSIONS:  1. Mitral annular calcification, otherwise normal mitral  valve. Minimal mitral regurgitation.  2. Aortic valve leaflet morphology not well visualized.  Mild aortic regurgitation.  3. Normal left ventricular internal dimensions and wall  thicknesses.  4. Endocardium not well visualized; grossly normal left  ventricular systolic function.  5. Unable to accurately evaluate right ventricular size or  systolic function.  6. Estimated right ventricular systolic pressure equals 62  mm Hg, assuming right atrial pressure equals 10 mm Hg,  consistent with severe pulmonary hypertension.    < end of copied text >    ====================ASSESSMENT AND PLAN ================  83 y.o. female with hx of COPD, c/o SOB, dyspnea on exertion, fatigue, weight loss of 15 lbs in last 6 months, reports occasional nonproductive cough, s/p CXR, followed by CT scan, reports multiple right lung nodules noted admitted  for lung resection.     Pre-Op Diagnosis:  Lung nodule  08/27/2018       Post-Op Dx:  Lung nodule  08/27/2018          Procedure:  Lobectomy of right lung with video-assisted thoracoscopic surgery  08/27/2018  Right upper lobectomy   Flexible bronchoscopy with bronchopulmonary lavage  08/27/2018         Issues: intraop and postop SVT with pauses            Pulm HTN by ECHO             postop pain            right chest tube in place            Hx COPD    ====================== NEUROLOGY=======================  Pain control with PCA / Tylenol IV / Toradol     ==================== RESPIRATORY========================  Pt is on     2       L nasal canula  Comfortable, no evidence of distress.  Using incentive spirometry & doing  @ 500-750  ml  Monitor chest tube output  Chest tube to suction -10cm H2O  Continue bronchodilators, pulmonary toilet  buDESOnide 160 MICROgram(s)/formoterol 4.5 MICROgram(s) Inhaler 2 Puff(s) Inhalation two times a day  ipratropium    for Nebulization 500 MICROGram(s) Nebulizer every 6 hours  Head of bed elevation to 30-40 degrees  OOB, ambulation as tolerated    ====================CARDIOVASCULAR=====================  Continue hemodynamic monitoring/ telemetry  F/up EP consult recs   Will keep  external pacing pads in place for now    ===================== RENAL ============================  Continue LR 30CC/hr   Monitor I/Os, BUN/ Cr  and electrolytes    ==================== GASTROINTESTINAL===================  On regular diet, tolerating well  Continue GI prophylaxis with Pepcid   Continue Zofran  for nausea - PRN	    =======================    ENDOCRIN  =====================  Glycemic monitoring  F/S with coverage    ===================HEMATOLOGIC/ONCOLOGIC =============  Monitor chest tube output. No signs of active bleeding.   Follow CBC, coags  in AM  DVT prophylaxis with SCD, sc Heparin    ========================INFECTIOUS DISEASE===============  No signs of infection. Monitor for fever / leukocytosis.  All surgical incision / chest tube  sites look clean        Pertinent clinical, laboratory, radiographic, hemodynamic, echocardiographic, respiratory data, microbiologic data and chart were reviewed and analyzed frequently throughout the course of the day and night. GI and DVT prophylaxis, glycemic control, head of bed elevation and skin care issues were addressed.  Patient seen, examined and plan discussed with CT Surgery / CTICU team during rounds.  Pt remains critically ill in imminent risk of  deterioration and requires very careful cardio- pulmonary monitoring and support.    I have spent     40   minutes of critical care time with this pt between  7   am   and   7:30  pm         minutes spent on total encounter; more than 50% of the visit was spent counseling and/or coordinating care by the attending physician.        ALVARO Snowden MD

## 2018-08-28 NOTE — PROGRESS NOTE ADULT - SUBJECTIVE AND OBJECTIVE BOX
Anesthesia Pain Management Service- Attending Addendum    SUBJECTIVE: Pt doing well with IV PCA without problems reported.    Therapy:	  [ X] IV PCA	   [ ] Epidural           [ ] s/p Spinal Opoid              [ ] Postpartum infusion	  [ ] Patient controlled regional anesthesia (PCRA)    [ ] prn Analgesics    Allergies    No Known Allergies    Intolerances      MEDICATIONS  (STANDING):  buDESOnide 160 MICROgram(s)/formoterol 4.5 MICROgram(s) Inhaler 2 Puff(s) Inhalation two times a day  diltiazem Infusion 5 mG/Hr (5 mL/Hr) IV Continuous <Continuous>  docusate sodium 100 milliGRAM(s) Oral three times a day  famotidine    Tablet 20 milliGRAM(s) Oral daily  heparin  Injectable 5000 Unit(s) SubCutaneous every 8 hours  HYDROmorphone PCA (1 mG/mL) 30 milliLiter(s) PCA Continuous PCA Continuous  ipratropium    for Nebulization 500 MICROGram(s) Nebulizer every 6 hours  lactated ringers. 1000 milliLiter(s) (30 mL/Hr) IV Continuous <Continuous>    MEDICATIONS  (PRN):  acetaminophen  IVPB. 1000 milliGRAM(s) IV Intermittent once PRN Moderate Pain (4 - 6)  HYDROmorphone PCA (1 mG/mL) Rescue Clinician Bolus 0.5 milliGRAM(s) IV Push every 15 minutes PRN for Pain Scale GREATER THAN 6  naloxone Injectable 0.1 milliGRAM(s) IV Push every 3 minutes PRN For ANY of the following changes in patient status:  A. RR LESS THAN 10 breaths per minute, B. Oxygen saturation LESS THAN 90%, C. Sedation score of 6  ondansetron Injectable 4 milliGRAM(s) IV Push every 6 hours PRN Nausea      OBJECTIVE:   [X] No new signs     [ ] Other:    Side Effects:  [X ] None			[ ] Other:    Assessment of Catheter Site:		[ ] Intact		[ ] Other:    ASSESSMENT/PLAN  [ X] Continue current therapy    [ ] Therapy changed to:    [ ] IV PCA       [ ] Epidural     [ ] prn Analgesics     Comments:

## 2018-08-28 NOTE — CONSULT NOTE ADULT - SUBJECTIVE AND OBJECTIVE BOX
RFC: SVT    HISTORY OF PRESENT ILLNESS:   Patient is an 84 yo F former smoker with COPD with recent REED and weight loss found to have RUL nodule concerning for malignancy now s/p RUL lobectomy. Patient developed SVT post extubation in the OR. Upon arrival to ICU, SVT became more predominent. She was initially given metop 2.5mg IVP x3 in OR without improvement and subsequently started on a cardizem gtt uptitrated to 15mg/hr this morning. However, after starting the gtt patient was found to have intermittent 3 sec pauses on tele. Throughout this, patient reamains asymptomatic. No CP, SOB, palpitations, abd pain, n/v. She has no prior cardiac history. Never had any cardiac studies.    Allergies  No Known Allergies      MEDICATIONS:  diltiazem Infusion 5 mG/Hr IV Continuous <Continuous>  heparin  Injectable 5000 Unit(s) SubCutaneous every 8 hours  buDESOnide 160 MICROgram(s)/formoterol 4.5 MICROgram(s) Inhaler 2 Puff(s) Inhalation two times a day  ipratropium    for Nebulization 500 MICROGram(s) Nebulizer every 6 hours  acetaminophen  IVPB. 1000 milliGRAM(s) IV Intermittent once PRN  HYDROmorphone PCA (1 mG/mL) 30 milliLiter(s) PCA Continuous PCA Continuous  HYDROmorphone PCA (1 mG/mL) Rescue Clinician Bolus 0.5 milliGRAM(s) IV Push every 15 minutes PRN  ondansetron Injectable 4 milliGRAM(s) IV Push every 6 hours PRN  docusate sodium 100 milliGRAM(s) Oral three times a day  famotidine    Tablet 20 milliGRAM(s) Oral daily  lactated ringers. 1000 milliLiter(s) IV Continuous <Continuous>      PAST MEDICAL & SURGICAL HISTORY:  Lung nodules  COPD (Chronic Obstructive Pulmonary Disease): diagnose in ~  Colon Polyps  History of Osteoporosis  History of Cataract Surgery: left eye  Hip Replacement: bilateral      FAMILY HISTORY:  Family history of liver cancer (Sibling)  Family history of lung cancer (Father): mesothelioma of lung      SOCIAL HISTORY:    former smoker      REVIEW OF SYSTEMS:  CONSTITUTIONAL: No weakness, fevers or chills  EYES/ENT: No visual changes;  No dysphagia  NECK: No pain or stiffness  RESPIRATORY: No cough, wheezing, hemoptysis; No shortness of breath  CARDIOVASCULAR: No chest pain or palpitations; No lower extremity edema  GASTROINTESTINAL: No abdominal or epigastric pain. No nausea, vomiting, or hematemesis; No diarrhea or constipation.   GENITOURINARY: No dysuria, frequency or hematuria  NEUROLOGICAL: No numbness or weakness  SKIN: No itching, burning, rashes, or lesions   All other review of systems is negative unless indicated above.    PHYSICAL EXAM:  T(C): 36.4 (18 @ 12:00), Max: 36.9 (18 @ 08:00)  HR: 91 (18 @ 15:00) (73 - 151)  BP: 117/49 (18 @ 15:00) (106/71 - 169/90)  RR: 19 (18 @ 15:00) (13 - 25)  SpO2: 96% (18 @ 15:00) (87% - 99%)  Wt(kg): --  I&O's Summary    27 Aug 2018 07:  -  28 Aug 2018 07:00  --------------------------------------------------------  IN: 745 mL / OUT: 440 mL / NET: 305 mL    28 Aug 2018 07:  -  28 Aug 2018 15:25  --------------------------------------------------------  IN: 885 mL / OUT: 140 mL / NET: 745 mL        Appearance: Normal, frail, no distress	  HEENT:   Normal oral mucosa  Cardiovascular: Normal S1 S2, No JVD, No murmurs, No edema  Respiratory: Lungs clear to auscultation	  Psychiatry: A & O x 3, Mood & affect appropriate  Gastrointestinal:  Soft, Non-tender, + BS	  Skin: No rashes, No ecchymoses, No cyanosis	  Neurologic: Non-focal  Extremities: Normal range of motion, No clubbing, cyanosis or edema      LABS:	 	  CBC Full  -  ( 28 Aug 2018 03:30 )  WBC Count : 14.10 K/uL  Hemoglobin : 13.6 g/dL  Hematocrit : 41.1 %  Platelet Count - Automated : 275 K/uL  Mean Cell Volume : 92.6 fL          136  |  99  |  19  ----------------------------<  126<H>  4.7   |  24  |  0.55      137  |  100  |  17  ----------------------------<  105<H>  3.8   |  21<L>  |  0.57    Ca    8.5      28 Aug 2018 03:30  Ca    8.8      27 Aug 2018 18:40  Phos  4.4       Phos  3.0       Mg     2.5       Mg     2.0         TPro  6.6  /  Alb  3.0<L>  /  TBili  0.7  /  DBili  x   /  AST  25  /  ALT  17  /  AlkPhos  95      TELEMETRY: 	      EC/28 - NSR, HR 90    RADIOLOGY:  CXR  - RUL pneumo, R chest tube, R lobe opacity and R pleural effusion    PREVIOUS CARDIAC DIAGNOSTIC TESTING:    none  	  ASSESSMENT/PLAN: 	  Patient is an 84 yo F former smoker with COPD with recent REED and weight loss found to have RUL nodule concerning for malignancy now s/p RUL lobectomy, with subsequent development of recurrent SVT.    SVT  - currently on cardizem gtt at 5mg/hr - decreased from 15 this morning; had 3 sec pauses on 15mg/hr which are now improved  - keep K>4, Mg>2    Will discuss with attending.    Prakash Corona MD  Cardiology Fellow RFC: SVT    HISTORY OF PRESENT ILLNESS:   Patient is an 82 yo F former smoker with COPD with recent REED and weight loss found to have RUL nodule concerning for malignancy now s/p RUL lobectomy. Patient developed SVT post extubation in the OR. Upon arrival to ICU, SVT became more predominent. She was initially given metop 2.5mg IVP x3 in OR without improvement and subsequently started on a cardizem gtt uptitrated to 15mg/hr this morning. However, after starting the gtt patient was found to have intermittent 3 sec pauses on tele. Throughout this, patient reamains asymptomatic. No CP, SOB, palpitations, abd pain, n/v. She has no prior cardiac history. Never had any cardiac studies.    Allergies  No Known Allergies      MEDICATIONS:  diltiazem Infusion 5 mG/Hr IV Continuous <Continuous>  heparin  Injectable 5000 Unit(s) SubCutaneous every 8 hours  buDESOnide 160 MICROgram(s)/formoterol 4.5 MICROgram(s) Inhaler 2 Puff(s) Inhalation two times a day  ipratropium    for Nebulization 500 MICROGram(s) Nebulizer every 6 hours  acetaminophen  IVPB. 1000 milliGRAM(s) IV Intermittent once PRN  HYDROmorphone PCA (1 mG/mL) 30 milliLiter(s) PCA Continuous PCA Continuous  HYDROmorphone PCA (1 mG/mL) Rescue Clinician Bolus 0.5 milliGRAM(s) IV Push every 15 minutes PRN  ondansetron Injectable 4 milliGRAM(s) IV Push every 6 hours PRN  docusate sodium 100 milliGRAM(s) Oral three times a day  famotidine    Tablet 20 milliGRAM(s) Oral daily  lactated ringers. 1000 milliLiter(s) IV Continuous <Continuous>      PAST MEDICAL & SURGICAL HISTORY:  Lung nodules  COPD (Chronic Obstructive Pulmonary Disease): diagnose in ~  Colon Polyps  History of Osteoporosis  History of Cataract Surgery: left eye  Hip Replacement: bilateral      FAMILY HISTORY:  Family history of liver cancer (Sibling)  Family history of lung cancer (Father): mesothelioma of lung      SOCIAL HISTORY:    former smoker      REVIEW OF SYSTEMS:  CONSTITUTIONAL: No weakness, fevers or chills  EYES/ENT: No visual changes;  No dysphagia  NECK: No pain or stiffness  RESPIRATORY: No cough, wheezing, hemoptysis; No shortness of breath  CARDIOVASCULAR: No chest pain or palpitations; No lower extremity edema  GASTROINTESTINAL: No abdominal or epigastric pain. No nausea, vomiting, or hematemesis; No diarrhea or constipation.   GENITOURINARY: No dysuria, frequency or hematuria  NEUROLOGICAL: No numbness or weakness  SKIN: No itching, burning, rashes, or lesions   All other review of systems is negative unless indicated above.    PHYSICAL EXAM:  T(C): 36.4 (18 @ 12:00), Max: 36.9 (18 @ 08:00)  HR: 91 (18 @ 15:00) (73 - 151)  BP: 117/49 (18 @ 15:00) (106/71 - 169/90)  RR: 19 (18 @ 15:00) (13 - 25)  SpO2: 96% (18 @ 15:00) (87% - 99%)  Wt(kg): --  I&O's Summary    27 Aug 2018 07:  -  28 Aug 2018 07:00  --------------------------------------------------------  IN: 745 mL / OUT: 440 mL / NET: 305 mL    28 Aug 2018 07:  -  28 Aug 2018 15:25  --------------------------------------------------------  IN: 885 mL / OUT: 140 mL / NET: 745 mL        Appearance: Normal, frail, no distress	  HEENT:   Normal oral mucosa  Cardiovascular: Normal S1 S2, No JVD, No murmurs, No edema  Respiratory: Lungs clear to auscultation	  Psychiatry: A & O x 3, Mood & affect appropriate  Gastrointestinal:  Soft, Non-tender, + BS	  Skin: No rashes, No ecchymoses, No cyanosis	  Neurologic: Non-focal  Extremities: Normal range of motion, No clubbing, cyanosis or edema      LABS:	 	  CBC Full  -  ( 28 Aug 2018 03:30 )  WBC Count : 14.10 K/uL  Hemoglobin : 13.6 g/dL  Hematocrit : 41.1 %  Platelet Count - Automated : 275 K/uL  Mean Cell Volume : 92.6 fL          136  |  99  |  19  ----------------------------<  126<H>  4.7   |  24  |  0.55      137  |  100  |  17  ----------------------------<  105<H>  3.8   |  21<L>  |  0.57    Ca    8.5      28 Aug 2018 03:30  Ca    8.8      27 Aug 2018 18:40  Phos  4.4       Phos  3.0       Mg     2.5       Mg     2.0         TPro  6.6  /  Alb  3.0<L>  /  TBili  0.7  /  DBili  x   /  AST  25  /  ALT  17  /  AlkPhos  95      TELEMETRY: runs of MAT, afib/flutter, intermittent pauses to 3.2s    EC/28 - NSR, HR 90    RADIOLOGY:  CXR  - RUL pneumo, R chest tube, R lobe opacity and R pleural effusion    PREVIOUS CARDIAC DIAGNOSTIC TESTING:    none  	  ASSESSMENT/PLAN: 	  Patient is an 82 yo F former smoker with COPD with recent REED and weight loss found to have RUL nodule concerning for malignancy now s/p RUL lobectomy, with subsequent development of recurrent SVT.    SVT  - review of tele shows periods of MAT, afib/flutter as well as intermittent pauses to 3.2s  - currently on cardizem gtt at 5mg/hr - decreased from 15 this morning; had 3 sec pauses on 15mg/hr which are now improved  - keep K>4, Mg>2    Will discuss with attending.    Prakash Corona MD  Cardiology Fellow RFC: SVT    HISTORY OF PRESENT ILLNESS:   Patient is an 82 yo F former smoker with COPD with recent REED and weight loss found to have RUL nodule concerning for malignancy now s/p RUL lobectomy. Patient developed SVT post extubation in the OR. Upon arrival to ICU, SVT became more predominent. She was initially given metop 2.5mg IVP x3 in OR without improvement and subsequently started on a cardizem gtt uptitrated to 15mg/hr this morning. However, after starting the gtt patient was found to have intermittent 3 sec pauses on tele. Throughout this, patient reamains asymptomatic. No CP, SOB, palpitations, abd pain, n/v. She has no prior cardiac history. Never had any cardiac studies.    Allergies  No Known Allergies      MEDICATIONS:  diltiazem Infusion 5 mG/Hr IV Continuous <Continuous>  heparin  Injectable 5000 Unit(s) SubCutaneous every 8 hours  buDESOnide 160 MICROgram(s)/formoterol 4.5 MICROgram(s) Inhaler 2 Puff(s) Inhalation two times a day  ipratropium    for Nebulization 500 MICROGram(s) Nebulizer every 6 hours  acetaminophen  IVPB. 1000 milliGRAM(s) IV Intermittent once PRN  HYDROmorphone PCA (1 mG/mL) 30 milliLiter(s) PCA Continuous PCA Continuous  HYDROmorphone PCA (1 mG/mL) Rescue Clinician Bolus 0.5 milliGRAM(s) IV Push every 15 minutes PRN  ondansetron Injectable 4 milliGRAM(s) IV Push every 6 hours PRN  docusate sodium 100 milliGRAM(s) Oral three times a day  famotidine    Tablet 20 milliGRAM(s) Oral daily  lactated ringers. 1000 milliLiter(s) IV Continuous <Continuous>      PAST MEDICAL & SURGICAL HISTORY:  Lung nodules  COPD (Chronic Obstructive Pulmonary Disease): diagnose in ~  Colon Polyps  History of Osteoporosis  History of Cataract Surgery: left eye  Hip Replacement: bilateral      FAMILY HISTORY:  Family history of liver cancer (Sibling)  Family history of lung cancer (Father): mesothelioma of lung      SOCIAL HISTORY:    former smoker      REVIEW OF SYSTEMS:  CONSTITUTIONAL: No weakness, fevers or chills  EYES/ENT: No visual changes;  No dysphagia  NECK: No pain or stiffness  RESPIRATORY: No cough, wheezing, hemoptysis; No shortness of breath  CARDIOVASCULAR: No chest pain or palpitations; No lower extremity edema  GASTROINTESTINAL: No abdominal or epigastric pain. No nausea, vomiting, or hematemesis; No diarrhea or constipation.   GENITOURINARY: No dysuria, frequency or hematuria  NEUROLOGICAL: No numbness or weakness  SKIN: No itching, burning, rashes, or lesions   All other review of systems is negative unless indicated above.    PHYSICAL EXAM:  T(C): 36.4 (18 @ 12:00), Max: 36.9 (18 @ 08:00)  HR: 91 (18 @ 15:00) (73 - 151)  BP: 117/49 (18 @ 15:00) (106/71 - 169/90)  RR: 19 (18 @ 15:00) (13 - 25)  SpO2: 96% (18 @ 15:00) (87% - 99%)  Wt(kg): --  I&O's Summary    27 Aug 2018 07:  -  28 Aug 2018 07:00  --------------------------------------------------------  IN: 745 mL / OUT: 440 mL / NET: 305 mL    28 Aug 2018 07:  -  28 Aug 2018 15:25  --------------------------------------------------------  IN: 885 mL / OUT: 140 mL / NET: 745 mL        Appearance: Normal, frail, no distress	  HEENT:   Normal oral mucosa  Cardiovascular: Normal S1 S2, No JVD, No murmurs, No edema  Respiratory: Lungs clear to auscultation	  Psychiatry: A & O x 3, Mood & affect appropriate  Gastrointestinal:  Soft, Non-tender, + BS	  Skin: No rashes, No ecchymoses, No cyanosis	  Neurologic: Non-focal  Extremities: Normal range of motion, No clubbing, cyanosis or edema      LABS:	 	  CBC Full  -  ( 28 Aug 2018 03:30 )  WBC Count : 14.10 K/uL  Hemoglobin : 13.6 g/dL  Hematocrit : 41.1 %  Platelet Count - Automated : 275 K/uL  Mean Cell Volume : 92.6 fL          136  |  99  |  19  ----------------------------<  126<H>  4.7   |  24  |  0.55      137  |  100  |  17  ----------------------------<  105<H>  3.8   |  21<L>  |  0.57    Ca    8.5      28 Aug 2018 03:30  Ca    8.8      27 Aug 2018 18:40  Phos  4.4       Phos  3.0       Mg     2.5       Mg     2.0         TPro  6.6  /  Alb  3.0<L>  /  TBili  0.7  /  DBili  x   /  AST  25  /  ALT  17  /  AlkPhos  95      TELEMETRY: runs of afib/flutter, intermittent pauses to 3.2s    EC/28 - NSR, HR 90    RADIOLOGY:  CXR  - RUL pneumo, R chest tube, R lobe opacity and R pleural effusion    PREVIOUS CARDIAC DIAGNOSTIC TESTING:    none  	  ASSESSMENT/PLAN: 	  Patient is an 82 yo F former smoker with COPD with recent REED and weight loss found to have RUL nodule concerning for malignancy now s/p RUL lobectomy, with subsequent development of recurrent SVT, found to be in afib/flutter intermittently.    Paroxsymal Afib/flutter  - review of tele shows periods of afib/flutter as well as intermittent pauses to 3.2s  - cardizem gtt off since 10am  - now in NSR, tachy to low 100s, frequent PACs  - start metoprolol tartrate 25mg BID -> if develops pauses on metop, may need PPM  - chadsvasc 3 - start on anticoagulation when able  - keep K>4, Mg>2    Discussed with attending.    Prakash Corona MD  Cardiology Fellow

## 2018-08-28 NOTE — PROGRESS NOTE ADULT - SUBJECTIVE AND OBJECTIVE BOX
Day _2__ of Anesthesia Pain Management Service    Allergies    No Known Allergies    Intolerances        SUBJECTIVE: I'm doing fine     Pain Scale Score	At rest: _2__ 	With Activity: ___ 	[ ] Refer to charted pain scores    THERAPY:    [ ] IV PCA Morphine		[ ] 5 mg/mL	[ ] 1 mg/mL  [X] IV PCA Hydromorphone	[ ] 5 mg/mL	[X] 1 mg/mL  [ ] IV PCA Fentanyl		[ ] 50 micrograms/mL    Demand dose _0.2mg_ lockout _6_ (minutes) Continuous Rate _0_ Total: __2.2_  Daily      MEDICATIONS  (STANDING):  buDESOnide 160 MICROgram(s)/formoterol 4.5 MICROgram(s) Inhaler 2 Puff(s) Inhalation two times a day  diltiazem Infusion 5 mG/Hr (5 mL/Hr) IV Continuous <Continuous>  docusate sodium 100 milliGRAM(s) Oral three times a day  famotidine    Tablet 20 milliGRAM(s) Oral daily  heparin  Injectable 5000 Unit(s) SubCutaneous every 8 hours  HYDROmorphone PCA (1 mG/mL) 30 milliLiter(s) PCA Continuous PCA Continuous  ipratropium    for Nebulization 500 MICROGram(s) Nebulizer every 6 hours  lactated ringers. 1000 milliLiter(s) (30 mL/Hr) IV Continuous <Continuous>    MEDICATIONS  (PRN):  acetaminophen  IVPB. 1000 milliGRAM(s) IV Intermittent once PRN Moderate Pain (4 - 6)  acetaminophen  IVPB. 1000 milliGRAM(s) IV Intermittent once PRN Moderate Pain (4 - 6)  HYDROmorphone PCA (1 mG/mL) Rescue Clinician Bolus 0.5 milliGRAM(s) IV Push every 15 minutes PRN for Pain Scale GREATER THAN 6  naloxone Injectable 0.1 milliGRAM(s) IV Push every 3 minutes PRN For ANY of the following changes in patient status:  A. RR LESS THAN 10 breaths per minute, B. Oxygen saturation LESS THAN 90%, C. Sedation score of 6  ondansetron Injectable 4 milliGRAM(s) IV Push every 6 hours PRN Nausea      OBJECTIVE: A&Ox3, NAD, supine in bed    Sedation Score:	[X] Alert	[ ] Drowsy	[ ] Arousable	[ ] Asleep	[ ] Unresponsive    Side Effects:	[X] None	[ ] Nausea	[ ] Vomiting	[ ] Pruritus  		  [ ] Weakness		[ ] Numbness	[ ] Other:                              13.6   14.10 )-----------( 275      ( 28 Aug 2018 03:30 )             41.1       08-28    136  |  99  |  19  ----------------------------<  126<H>  4.7   |  24  |  0.55    Ca    8.5      28 Aug 2018 03:30  Phos  4.4     08-28  Mg     2.5     08-28    TPro  6.6  /  Alb  3.0<L>  /  TBili  0.7  /  DBili  x   /  AST  25  /  ALT  17  /  AlkPhos  95  08-27      ASSESSMENT/ PLAN    Therapy to  be:	[X] Continue   [ ] Discontinued   [ ] Change to prn Analgesics    Documentation and Verification of current medications:  [X] Done	[ ] Not done, not eligible  [ ] Not done, reason not given    [ ]  [place]  Reviewed and Copied to Chart    Comments: continue current therapy

## 2018-08-29 ENCOUNTER — TRANSCRIPTION ENCOUNTER (OUTPATIENT)
Age: 83
End: 2018-08-29

## 2018-08-29 LAB
ALBUMIN SERPL ELPH-MCNC: 3 G/DL — LOW (ref 3.3–5)
ALP SERPL-CCNC: 105 U/L — SIGNIFICANT CHANGE UP (ref 40–120)
ALT FLD-CCNC: 17 U/L — SIGNIFICANT CHANGE UP (ref 4–33)
AST SERPL-CCNC: 25 U/L — SIGNIFICANT CHANGE UP (ref 4–32)
BILIRUB SERPL-MCNC: 0.4 MG/DL — SIGNIFICANT CHANGE UP (ref 0.2–1.2)
BUN SERPL-MCNC: 22 MG/DL — SIGNIFICANT CHANGE UP (ref 7–23)
CA-I BLD-SCNC: 1.17 MMOL/L — SIGNIFICANT CHANGE UP (ref 1.03–1.23)
CALCIUM SERPL-MCNC: 9 MG/DL — SIGNIFICANT CHANGE UP (ref 8.4–10.5)
CHLORIDE SERPL-SCNC: 98 MMOL/L — SIGNIFICANT CHANGE UP (ref 98–107)
CO2 SERPL-SCNC: 24 MMOL/L — SIGNIFICANT CHANGE UP (ref 22–31)
CREAT SERPL-MCNC: 0.63 MG/DL — SIGNIFICANT CHANGE UP (ref 0.5–1.3)
GLUCOSE SERPL-MCNC: 109 MG/DL — HIGH (ref 70–99)
HCT VFR BLD CALC: 41.2 % — SIGNIFICANT CHANGE UP (ref 34.5–45)
HGB BLD-MCNC: 13.5 G/DL — SIGNIFICANT CHANGE UP (ref 11.5–15.5)
MAGNESIUM SERPL-MCNC: 2.1 MG/DL — SIGNIFICANT CHANGE UP (ref 1.6–2.6)
MCHC RBC-ENTMCNC: 29.9 PG — SIGNIFICANT CHANGE UP (ref 27–34)
MCHC RBC-ENTMCNC: 32.8 % — SIGNIFICANT CHANGE UP (ref 32–36)
MCV RBC AUTO: 91.2 FL — SIGNIFICANT CHANGE UP (ref 80–100)
NRBC # FLD: 0 — SIGNIFICANT CHANGE UP
PHOSPHATE SERPL-MCNC: 2.5 MG/DL — SIGNIFICANT CHANGE UP (ref 2.5–4.5)
PLATELET # BLD AUTO: 299 K/UL — SIGNIFICANT CHANGE UP (ref 150–400)
PMV BLD: 10.1 FL — SIGNIFICANT CHANGE UP (ref 7–13)
POTASSIUM SERPL-MCNC: 4.4 MMOL/L — SIGNIFICANT CHANGE UP (ref 3.5–5.3)
POTASSIUM SERPL-SCNC: 4.4 MMOL/L — SIGNIFICANT CHANGE UP (ref 3.5–5.3)
PROT SERPL-MCNC: 6.7 G/DL — SIGNIFICANT CHANGE UP (ref 6–8.3)
RBC # BLD: 4.52 M/UL — SIGNIFICANT CHANGE UP (ref 3.8–5.2)
RBC # FLD: 13.2 % — SIGNIFICANT CHANGE UP (ref 10.3–14.5)
SODIUM SERPL-SCNC: 135 MMOL/L — SIGNIFICANT CHANGE UP (ref 135–145)
WBC # BLD: 11.74 K/UL — HIGH (ref 3.8–10.5)
WBC # FLD AUTO: 11.74 K/UL — HIGH (ref 3.8–10.5)

## 2018-08-29 PROCEDURE — 99291 CRITICAL CARE FIRST HOUR: CPT

## 2018-08-29 PROCEDURE — 71045 X-RAY EXAM CHEST 1 VIEW: CPT | Mod: 26

## 2018-08-29 PROCEDURE — 99232 SBSQ HOSP IP/OBS MODERATE 35: CPT

## 2018-08-29 PROCEDURE — 71045 X-RAY EXAM CHEST 1 VIEW: CPT | Mod: 26,77

## 2018-08-29 RX ORDER — ACETAMINOPHEN 500 MG
650 TABLET ORAL EVERY 6 HOURS
Qty: 0 | Refills: 0 | Status: COMPLETED | OUTPATIENT
Start: 2018-08-29 | End: 2018-09-01

## 2018-08-29 RX ORDER — DORNASE ALFA 1 MG/ML
2.5 SOLUTION RESPIRATORY (INHALATION) DAILY
Qty: 0 | Refills: 0 | Status: COMPLETED | OUTPATIENT
Start: 2018-08-29 | End: 2018-09-02

## 2018-08-29 RX ORDER — ACETAMINOPHEN 500 MG
750 TABLET ORAL ONCE
Qty: 0 | Refills: 0 | Status: COMPLETED | OUTPATIENT
Start: 2018-08-29 | End: 2018-08-29

## 2018-08-29 RX ORDER — METOPROLOL TARTRATE 50 MG
25 TABLET ORAL ONCE
Qty: 0 | Refills: 0 | Status: COMPLETED | OUTPATIENT
Start: 2018-08-29 | End: 2018-08-29

## 2018-08-29 RX ORDER — METOPROLOL TARTRATE 50 MG
25 TABLET ORAL EVERY 8 HOURS
Qty: 0 | Refills: 0 | Status: DISCONTINUED | OUTPATIENT
Start: 2018-08-30 | End: 2018-09-17

## 2018-08-29 RX ORDER — LEVALBUTEROL 1.25 MG/.5ML
0.63 SOLUTION, CONCENTRATE RESPIRATORY (INHALATION) EVERY 6 HOURS
Qty: 0 | Refills: 0 | Status: DISCONTINUED | OUTPATIENT
Start: 2018-08-29 | End: 2018-09-17

## 2018-08-29 RX ORDER — ACETAMINOPHEN 500 MG
1000 TABLET ORAL ONCE
Qty: 0 | Refills: 0 | Status: COMPLETED | OUTPATIENT
Start: 2018-08-29 | End: 2018-08-29

## 2018-08-29 RX ORDER — METOPROLOL TARTRATE 50 MG
2.5 TABLET ORAL ONCE
Qty: 0 | Refills: 0 | Status: COMPLETED | OUTPATIENT
Start: 2018-08-29 | End: 2018-08-29

## 2018-08-29 RX ORDER — FUROSEMIDE 40 MG
5 TABLET ORAL ONCE
Qty: 0 | Refills: 0 | Status: DISCONTINUED | OUTPATIENT
Start: 2018-08-29 | End: 2018-08-29

## 2018-08-29 RX ADMIN — Medication 500 MICROGRAM(S): at 16:55

## 2018-08-29 RX ADMIN — SODIUM CHLORIDE 30 MILLILITER(S): 9 INJECTION, SOLUTION INTRAVENOUS at 07:33

## 2018-08-29 RX ADMIN — Medication 650 MILLIGRAM(S): at 12:30

## 2018-08-29 RX ADMIN — HYDROMORPHONE HYDROCHLORIDE 30 MILLILITER(S): 2 INJECTION INTRAMUSCULAR; INTRAVENOUS; SUBCUTANEOUS at 19:09

## 2018-08-29 RX ADMIN — Medication 25 MILLIGRAM(S): at 05:34

## 2018-08-29 RX ADMIN — LEVALBUTEROL 0.63 MILLIGRAM(S): 1.25 SOLUTION, CONCENTRATE RESPIRATORY (INHALATION) at 21:19

## 2018-08-29 RX ADMIN — Medication 500 MICROGRAM(S): at 04:55

## 2018-08-29 RX ADMIN — HEPARIN SODIUM 5000 UNIT(S): 5000 INJECTION INTRAVENOUS; SUBCUTANEOUS at 05:34

## 2018-08-29 RX ADMIN — HEPARIN SODIUM 5000 UNIT(S): 5000 INJECTION INTRAVENOUS; SUBCUTANEOUS at 21:38

## 2018-08-29 RX ADMIN — Medication 25 MILLIGRAM(S): at 22:39

## 2018-08-29 RX ADMIN — HYDROMORPHONE HYDROCHLORIDE 30 MILLILITER(S): 2 INJECTION INTRAMUSCULAR; INTRAVENOUS; SUBCUTANEOUS at 07:33

## 2018-08-29 RX ADMIN — Medication 100 MILLIGRAM(S): at 13:55

## 2018-08-29 RX ADMIN — LEVALBUTEROL 0.63 MILLIGRAM(S): 1.25 SOLUTION, CONCENTRATE RESPIRATORY (INHALATION) at 16:55

## 2018-08-29 RX ADMIN — HEPARIN SODIUM 5000 UNIT(S): 5000 INJECTION INTRAVENOUS; SUBCUTANEOUS at 13:55

## 2018-08-29 RX ADMIN — Medication 100 MILLIGRAM(S): at 05:34

## 2018-08-29 RX ADMIN — Medication 2.5 MILLIGRAM(S): at 19:10

## 2018-08-29 RX ADMIN — Medication 750 MILLIGRAM(S): at 23:00

## 2018-08-29 RX ADMIN — Medication 1000 MILLIGRAM(S): at 15:50

## 2018-08-29 RX ADMIN — Medication 100 MILLIGRAM(S): at 21:38

## 2018-08-29 RX ADMIN — FAMOTIDINE 20 MILLIGRAM(S): 10 INJECTION INTRAVENOUS at 11:52

## 2018-08-29 RX ADMIN — Medication 650 MILLIGRAM(S): at 12:00

## 2018-08-29 RX ADMIN — LEVALBUTEROL 0.63 MILLIGRAM(S): 1.25 SOLUTION, CONCENTRATE RESPIRATORY (INHALATION) at 01:00

## 2018-08-29 RX ADMIN — Medication 400 MILLIGRAM(S): at 15:35

## 2018-08-29 RX ADMIN — SODIUM CHLORIDE 30 MILLILITER(S): 9 INJECTION, SOLUTION INTRAVENOUS at 19:09

## 2018-08-29 RX ADMIN — LEVALBUTEROL 0.63 MILLIGRAM(S): 1.25 SOLUTION, CONCENTRATE RESPIRATORY (INHALATION) at 04:55

## 2018-08-29 RX ADMIN — Medication 25 MILLIGRAM(S): at 18:07

## 2018-08-29 RX ADMIN — Medication 300 MILLIGRAM(S): at 22:39

## 2018-08-29 RX ADMIN — Medication 500 MICROGRAM(S): at 21:19

## 2018-08-29 RX ADMIN — BUDESONIDE AND FORMOTEROL FUMARATE DIHYDRATE 2 PUFF(S): 160; 4.5 AEROSOL RESPIRATORY (INHALATION) at 21:28

## 2018-08-29 NOTE — PROGRESS NOTE ADULT - SUBJECTIVE AND OBJECTIVE BOX
LIAM VELOZ            [id number]         No Known Allergies                 83 y.o. female with hx of COPD, c/o SOB, dyspnea on exertion, fatigue, weight loss of 15 lbs in last 6 months, reports occasional nonproductive cough, s/p CXR, followed by CT scan, reports multiple right lung nodules noted admitted  for lung resection.      Procedure:  RVATS /   Right upper lobectomy   08/27/2018       Issues:   Shortness of breath / Bronchospasm / COPD exacerbation  SVT / a-FIB  Pneumothorax  Air leak              Postop pain                               Home Medications:  Advair Diskus 250 mcg-50 mcg inhalation powder: 1 puff(s) inhaled 2 times a day (27 Aug 2018 12:42)  Spiriva 18 mcg inhalation capsule: 1 cap(s) inhaled once a day (27 Aug 2018 12:42)      PAST MEDICAL & SURGICAL HISTORY:  Lung nodules  COPD (Chronic Obstructive Pulmonary Disease): diagnose in ~2013  Colon Polyps  History of Osteoporosis  History of Cataract Surgery: left eye  Hip Replacement: bilateral        ICU Vital Signs Last 24 Hrs  T(C): 36.7 (29 Aug 2018 04:00), Max: 36.9 (28 Aug 2018 08:00)  T(F): 98 (29 Aug 2018 04:00), Max: 98.5 (28 Aug 2018 20:00)  HR: 91 (29 Aug 2018 04:56) (73 - 151)  BP: 124/64 (29 Aug 2018 03:00) (106/71 - 142/66)  BP(mean): 78 (29 Aug 2018 03:00) (58 - 95)  ABP: --  ABP(mean): --  RR: 20 (29 Aug 2018 04:00) (15 - 25)  SpO2: 100% (29 Aug 2018 04:56) (92% - 100%)    I&O's Detail    27 Aug 2018 07:01  -  28 Aug 2018 07:00  --------------------------------------------------------  IN:    diltiazem Infusion: 5 mL    IV PiggyBack: 200 mL    lactated ringers.: 420 mL    Oral Fluid: 120 mL  Total IN: 745 mL    OUT:    Chest Tube: 190 mL    Voided: 250 mL  Total OUT: 440 mL    Total NET: 305 mL      28 Aug 2018 07:01  -  29 Aug 2018 05:15  --------------------------------------------------------  IN:    diltiazem Infusion: 35 mL    IV PiggyBack: 200 mL    lactated ringers.: 630 mL    Oral Fluid: 1200 mL  Total IN: 2065 mL    OUT:    Chest Tube: 430 mL  Total OUT: 430 mL    Total NET: 1635 mL        CAPILLARY BLOOD GLUCOSE          Home Medications:  Advair Diskus 250 mcg-50 mcg inhalation powder: 1 puff(s) inhaled 2 times a day (27 Aug 2018 12:42)  Spiriva 18 mcg inhalation capsule: 1 cap(s) inhaled once a day (27 Aug 2018 12:42)      MEDICATIONS  (STANDING):  buDESOnide 160 MICROgram(s)/formoterol 4.5 MICROgram(s) Inhaler 2 Puff(s) Inhalation two times a day  diltiazem Infusion 5 mG/Hr (5 mL/Hr) IV Continuous <Continuous>  docusate sodium 100 milliGRAM(s) Oral three times a day  famotidine    Tablet 20 milliGRAM(s) Oral daily  heparin  Injectable 5000 Unit(s) SubCutaneous every 8 hours  HYDROmorphone PCA (1 mG/mL) 30 milliLiter(s) PCA Continuous PCA Continuous  ipratropium    for Nebulization 500 MICROGram(s) Nebulizer every 6 hours  lactated ringers. 1000 milliLiter(s) (30 mL/Hr) IV Continuous <Continuous>  levalbuterol Inhalation 0.63 milliGRAM(s) Inhalation every 6 hours  metoprolol tartrate 25 milliGRAM(s) Oral every 12 hours    MEDICATIONS  (PRN):  HYDROmorphone PCA (1 mG/mL) Rescue Clinician Bolus 0.5 milliGRAM(s) IV Push every 15 minutes PRN for Pain Scale GREATER THAN 6  naloxone Injectable 0.1 milliGRAM(s) IV Push every 3 minutes PRN For ANY of the following changes in patient status:  A. RR LESS THAN 10 breaths per minute, B. Oxygen saturation LESS THAN 90%, C. Sedation score of 6  ondansetron Injectable 4 milliGRAM(s) IV Push every 6 hours PRN Nausea          Physical exam:                             General:               Pt is awake, alert, has some shortness of breath but not in any distress                                                  Neuro:                  Nonfocal                             Cardiovascular:     S1 & S2, regular / irregular                          Respiratory:         Air entry is fair and equal on both sides, has bilateral conducted sounds                           GI:                         Soft, nondistended and nontender, Bowel sounds active                            Ext:                        No cyanosis or edema     Labs:                                                                           13.5   11.74 )-----------( 299      ( 29 Aug 2018 04:45 )             41.2             08-28    136  |  99  |  19  ----------------------------<  126<H>  4.7   |  24  |  0.55    Ca    8.5      28 Aug 2018 03:30  Phos  4.4     08-28  Mg     2.5     08-28    TPro  6.6  /  Alb  3.0<L>  /  TBili  0.7  /  DBili  x   /  AST  25  /  ALT  17  /  AlkPhos  95  08-27                    LIVER FUNCTIONS - ( 27 Aug 2018 18:40 )  Alb: 3.0 g/dL / Pro: 6.6 g/dL / ALK PHOS: 95 u/L / ALT: 17 u/L / AST: 25 u/L / GGT: x               CXR:      < from: Xray Chest 1 View-PORTABLE IMMEDIATE (08.29.18 @ 02:59) >   persistent right pneumothorax        Plan:    General: 83yFemale s/p RVATS /   Right upper lobectomy   08/27/2018 experiencing  pain with deep breathing. Postop period is significant for SVT / A-Fib. Pt had episodes of pauses on Cardizem EPS evaluated and recommended Lopressor 25mg bid & PPM if experiences pauses again.                            Neuro:                                         Pain control with  Tylenol IV                            Cardiovascular:                                          Continue hemodynamic monitoring.    SVT / A-fb: EPS evaluated and recommended Lopressor 25mg bid & PPM if experiences pauses again. Anticoagulation when OK with CTS.                             Respiratory:                                         Pt is on 2L  nasal canula,                                           Comfortable, not in any distress.                                         Using incentive spirometry                                          Monitor chest tube output                                         Chest tube to suction -10CM. Has large air leak                                                                  COPD: Had episode of bronchospasm, relieved with Xopenex / Atrovent nebulizer Rx                            GI                                         On regular diet                                         Continue GI prophylaxis with  Protonix                                         Continue Zofran / Reglan for nausea - PRN	                                                                 Renal:                                         Continue LR 30cc/hr                                         Monitor I/Os and electrolytes                                                                                        Hem/ Onc:                                                                                  Monitor chest tube output &  signs of bleeding.                                          Follow CBC in AM                           Infectious disease:                                            No signs of infection. Monitor for fever / leukocytosis.                                          All surgical incision / chest tube  sites look clean                            Endocrine                                             Continue Accu-Checks with coverage    Pt is on SQ Heparin and Venodyne boots for DVT prophylaxis.     Pertinent clinical, laboratory, radiographic, hemodynamic, echocardiographic, respiratory data, microbiologic data and chart were reviewed and analyzed frequently throughout the course of the day and night  Patient seen, examined and plan discussed with CT Surgeon / CTICU team during rounds.    Pt's status discussed with family at bedside, updated status    I have spent  45   minutes of critical care time with this pt between  00am  and  8am               Michael Moya MD

## 2018-08-29 NOTE — PROGRESS NOTE ADULT - ASSESSMENT
83 y.o. female with hx of COPD, no significant cardiac history presenting with intraop- postop SVT s/p Right upper lobectomy.     1. Intraop/postop SVT  s/p Right upper lobectomy, found to be in SVT responsive to IV CCB   this am pt. found to have frequent pauses > 3 seconds, likely secondary to ccb  brief episodes of afib/flutter intermittently noted on tele   Pt. now remains in NSR after being started on metoprolol 25mg PO BID   Echo with normal LV function   EP f/u     2. S/p Right upper lobectomy   Care per CTICU     3. COPD  stable on inhalers     dvt ppx 83 y.o. female with hx of COPD, no significant cardiac history presenting with intraop- postop SVT s/p Right upper lobectomy.     1. Intraop/postop SVT  s/p Right upper lobectomy, found to be in SVT responsive to IV CCB   after starting ccb gtt patient was found to have intermittent 3 sec pauses on tele.   brief episodes of afib/flutter intermittently noted on tele   Pt. now remains in NSR after being started on metoprolol 25mg PO BID   Echo with normal LV function   EP f/u     2. S/p Right upper lobectomy   Care per CTICU     3. COPD  stable on inhalers     dvt ppx

## 2018-08-29 NOTE — PROGRESS NOTE ADULT - SUBJECTIVE AND OBJECTIVE BOX
Subjective:  Has some chest pain at chest tube site. Denies palp, SOB, dizziness, n/v.     MEDICATIONS:  diltiazem Infusion 5 mG/Hr IV Continuous <Continuous>  heparin  Injectable 5000 Unit(s) SubCutaneous every 8 hours  metoprolol tartrate 25 milliGRAM(s) Oral every 12 hours  buDESOnide 160 MICROgram(s)/formoterol 4.5 MICROgram(s) Inhaler 2 Puff(s) Inhalation two times a day  ipratropium    for Nebulization 500 MICROGram(s) Nebulizer every 6 hours  levalbuterol Inhalation 0.63 milliGRAM(s) Inhalation every 6 hours  acetaminophen   Tablet. 650 milliGRAM(s) Oral every 6 hours  acetaminophen  IVPB. 1000 milliGRAM(s) IV Intermittent once  HYDROmorphone PCA (1 mG/mL) 30 milliLiter(s) PCA Continuous PCA Continuous  HYDROmorphone PCA (1 mG/mL) Rescue Clinician Bolus 0.5 milliGRAM(s) IV Push every 15 minutes PRN  ondansetron Injectable 4 milliGRAM(s) IV Push every 6 hours PRN  docusate sodium 100 milliGRAM(s) Oral three times a day  famotidine    Tablet 20 milliGRAM(s) Oral daily  lactated ringers. 1000 milliLiter(s) IV Continuous <Continuous>      PHYSICAL EXAM:  T(C): 37 (08-29-18 @ 08:00), Max: 37 (08-29-18 @ 08:00)  HR: 100 (08-29-18 @ 11:00) (78 - 107)  BP: 137/46 (08-29-18 @ 11:00) (113/70 - 142/66)  RR: 15 (08-29-18 @ 11:00) (14 - 24)  SpO2: 95% (08-29-18 @ 11:00) (92% - 100%)  Wt(kg): --  I&O's Summary    28 Aug 2018 07:01  -  29 Aug 2018 07:00  --------------------------------------------------------  IN: 2395 mL / OUT: 480 mL / NET: 1915 mL    29 Aug 2018 07:01  -  29 Aug 2018 12:08  --------------------------------------------------------  IN: 150 mL / OUT: 40 mL / NET: 110 mL    Appearance: Normal, frail, no distress	  HEENT:   Normal oral mucosa  Cardiovascular: Normal S1 S2, No JVD, No murmurs, No edema  Respiratory: Lungs clear to auscultation	  Psychiatry: A & O x 3, Mood & affect appropriate  Gastrointestinal:  Soft, Non-tender, + BS	  Skin: No rashes, No ecchymoses, No cyanosis	  Neurologic: Non-focal  Extremities: Normal range of motion, No clubbing, cyanosis or edema      LABS:	 	    CBC Full  -  ( 29 Aug 2018 04:45 )  WBC Count : 11.74 K/uL  Hemoglobin : 13.5 g/dL  Hematocrit : 41.2 %  Platelet Count - Automated : 299 K/uL    08-29    135  |  98  |  22  ----------------------------<  109<H>  4.4   |  24  |  0.63  08-28    136  |  99  |  19  ----------------------------<  126<H>  4.7   |  24  |  0.55    Ca    9.0      29 Aug 2018 04:45  Ca    8.5      28 Aug 2018 03:30  Phos  2.5     08-29  Phos  4.4     08-28  Mg     2.1     08-29  Mg     2.5     08-28    TPro  6.7  /  Alb  3.0<L>  /  TBili  0.4  /  DBili  x   /  AST  25  /  ALT  17  /  AlkPhos  105  08-29  TPro  6.6  /  Alb  3.0<L>  /  TBili  0.7  /  DBili  x   /  AST  25  /  ALT  17  /  AlkPhos  95  08-27      TELEMETRY: intermittent runs of PAF, PACs, 2 pauses - 1.7s and 1.4s    PREVIOUS DIAGNOSTIC TESTING:    Echo 8/28/18  1. Mitral annular calcification, otherwise normal mitral  valve. Minimal mitral regurgitation.  2. Aortic valve leaflet morphology not well visualized.  Mild aortic regurgitation.  3. Normal left ventricular internal dimensions and wall  thicknesses.  4. Endocardium not well visualized; grossly normal left  ventricular systolic function.  5. Unable to accurately evaluate right ventricular size or  systolic function.  6. Estimated right ventricular systolic pressure equals 62  mm Hg, assuming right atrial pressure equals 10 mm Hg,  consistent with severe pulmonary hypertension.    ASSESSMENT/PLAN: 	  Patient is an 84 yo F former smoker with COPD with recent REED and weight loss found to have RUL nodule concerning for malignancy now s/p RUL lobectomy, with subsequent development of recurrent SVT, found to be in afib/flutter intermittently.    Paroxsymal Afib/flutter  - cont to have short bursts afib/flutter, two pauses noted longest 1.7 - remains asymptomatic throughout this  - cont metoprolol tartrate 25mg BID -> if develops longer pauses on metop, may need PPM  - will discuss with primary cardiologist regarding starting flecainide   - chadsvasc 3 - start on anticoagulation when able  - keep K>4, Mg>2    Discussed with attending.    Prakash Corona MD  Cardiology Fellow

## 2018-08-29 NOTE — PROGRESS NOTE ADULT - SUBJECTIVE AND OBJECTIVE BOX
CARDIOLOGY FOLLOW UP - Dr. Arevalo    CC no cp/sob       PHYSICAL EXAM:  T(C): 37 (08-29-18 @ 08:00), Max: 37 (08-29-18 @ 08:00)  HR: 84 (08-29-18 @ 09:00) (77 - 151)  BP: 128/60 (08-29-18 @ 09:00) (106/71 - 142/66)  RR: 18 (08-29-18 @ 09:00) (15 - 25)  SpO2: 96% (08-29-18 @ 09:00) (92% - 100%)  Wt(kg): --  I&O's Summary    28 Aug 2018 07:01  -  29 Aug 2018 07:00  --------------------------------------------------------  IN: 2395 mL / OUT: 480 mL / NET: 1915 mL    29 Aug 2018 07:01  -  29 Aug 2018 09:50  --------------------------------------------------------  IN: 90 mL / OUT: 40 mL / NET: 50 mL        Appearance: Normal	  Cardiovascular: Normal S1 S2,RRR, No JVD, No murmurs  Respiratory: Lungs clear to auscultation, +chest tube   Gastrointestinal:  Soft, Non-tender, + BS	  Extremities: Normal range of motion, No clubbing, cyanosis or edema        MEDICATIONS  (STANDING):  acetaminophen   Tablet. 650 milliGRAM(s) Oral every 6 hours  buDESOnide 160 MICROgram(s)/formoterol 4.5 MICROgram(s) Inhaler 2 Puff(s) Inhalation two times a day  diltiazem Infusion 5 mG/Hr (5 mL/Hr) IV Continuous <Continuous>  docusate sodium 100 milliGRAM(s) Oral three times a day  famotidine    Tablet 20 milliGRAM(s) Oral daily  heparin  Injectable 5000 Unit(s) SubCutaneous every 8 hours  HYDROmorphone PCA (1 mG/mL) 30 milliLiter(s) PCA Continuous PCA Continuous  ipratropium    for Nebulization 500 MICROGram(s) Nebulizer every 6 hours  lactated ringers. 1000 milliLiter(s) (30 mL/Hr) IV Continuous <Continuous>  levalbuterol Inhalation 0.63 milliGRAM(s) Inhalation every 6 hours  metoprolol tartrate 25 milliGRAM(s) Oral every 12 hours      TELEMETRY: NSR 91 	    ECG:  	  RADIOLOGY:   DIAGNOSTIC TESTING:  [ ] Echocardiogram: < from: Transthoracic Echocardiogram (08.28.18 @ 15:54) >  CONCLUSIONS:  1. Mitral annular calcification, otherwise normal mitral  valve. Minimal mitral regurgitation.  2. Aortic valve leaflet morphology not well visualized.  Mild aortic regurgitation.  3. Normal left ventricular internal dimensions and wall  thicknesses.  4. Endocardium not well visualized; grossly normal left  ventricular systolic function.  5. Unable to accurately evaluate right ventricular size or  systolic function.  6. Estimated right ventricular systolic pressure equals 62  mm Hg, assuming right atrial pressure equals 10 mm Hg,  consistent with severe pulmonary hypertension.    < end of copied text >    [ ]  Catheterization:  [ ] Stress Test:    OTHER: 	    LABS:	 	                                13.5   11.74 )-----------( 299      ( 29 Aug 2018 04:45 )             41.2     08-29    135  |  98  |  22  ----------------------------<  109<H>  4.4   |  24  |  0.63    Ca    9.0      29 Aug 2018 04:45  Phos  2.5     08-29  Mg     2.1     08-29    TPro  6.7  /  Alb  3.0<L>  /  TBili  0.4  /  DBili  x   /  AST  25  /  ALT  17  /  AlkPhos  105  08-29          Serene Burdick [facility] CARDIOLOGY FOLLOW UP - Dr. Arevalo    CC no cp/sob       PHYSICAL EXAM:  T(C): 37 (08-29-18 @ 08:00), Max: 37 (08-29-18 @ 08:00)  HR: 84 (08-29-18 @ 09:00) (77 - 151)  BP: 128/60 (08-29-18 @ 09:00) (106/71 - 142/66)  RR: 18 (08-29-18 @ 09:00) (15 - 25)  SpO2: 96% (08-29-18 @ 09:00) (92% - 100%)  Wt(kg): --  I&O's Summary    28 Aug 2018 07:01  -  29 Aug 2018 07:00  --------------------------------------------------------  IN: 2395 mL / OUT: 480 mL / NET: 1915 mL    29 Aug 2018 07:01  -  29 Aug 2018 09:50  --------------------------------------------------------  IN: 90 mL / OUT: 40 mL / NET: 50 mL        Appearance: Normal	  Cardiovascular: Normal S1 S2,RRR, No JVD, No murmurs  Respiratory: Lungs clear to auscultation, +chest tube   Gastrointestinal:  Soft, Non-tender, + BS	  Extremities: Normal range of motion, No clubbing, cyanosis or edema        MEDICATIONS  (STANDING):  acetaminophen   Tablet. 650 milliGRAM(s) Oral every 6 hours  buDESOnide 160 MICROgram(s)/formoterol 4.5 MICROgram(s) Inhaler 2 Puff(s) Inhalation two times a day  diltiazem Infusion 5 mG/Hr (5 mL/Hr) IV Continuous <Continuous>  docusate sodium 100 milliGRAM(s) Oral three times a day  famotidine    Tablet 20 milliGRAM(s) Oral daily  heparin  Injectable 5000 Unit(s) SubCutaneous every 8 hours  HYDROmorphone PCA (1 mG/mL) 30 milliLiter(s) PCA Continuous PCA Continuous  ipratropium    for Nebulization 500 MICROGram(s) Nebulizer every 6 hours  lactated ringers. 1000 milliLiter(s) (30 mL/Hr) IV Continuous <Continuous>  levalbuterol Inhalation 0.63 milliGRAM(s) Inhalation every 6 hours  metoprolol tartrate 25 milliGRAM(s) Oral every 12 hours      TELEMETRY: NSR 91, PACs   ECG:  	  RADIOLOGY:   DIAGNOSTIC TESTING:  [ ] Echocardiogram: < from: Transthoracic Echocardiogram (08.28.18 @ 15:54) >  CONCLUSIONS:  1. Mitral annular calcification, otherwise normal mitral  valve. Minimal mitral regurgitation.  2. Aortic valve leaflet morphology not well visualized.  Mild aortic regurgitation.  3. Normal left ventricular internal dimensions and wall  thicknesses.  4. Endocardium not well visualized; grossly normal left  ventricular systolic function.  5. Unable to accurately evaluate right ventricular size or  systolic function.  6. Estimated right ventricular systolic pressure equals 62  mm Hg, assuming right atrial pressure equals 10 mm Hg,  consistent with severe pulmonary hypertension.    < end of copied text >    [ ]  Catheterization:  [ ] Stress Test:    OTHER: 	    LABS:	 	                                13.5   11.74 )-----------( 299      ( 29 Aug 2018 04:45 )             41.2     08-29    135  |  98  |  22  ----------------------------<  109<H>  4.4   |  24  |  0.63    Ca    9.0      29 Aug 2018 04:45  Phos  2.5     08-29  Mg     2.1     08-29    TPro  6.7  /  Alb  3.0<L>  /  TBili  0.4  /  DBili  x   /  AST  25  /  ALT  17  /  AlkPhos  105  08-29          Serene Burdick [facility] CARDIOLOGY FOLLOW UP - Dr. Arevalo - for koss    CC no cp/sob       PHYSICAL EXAM:  T(C): 37 (08-29-18 @ 08:00), Max: 37 (08-29-18 @ 08:00)  HR: 84 (08-29-18 @ 09:00) (77 - 151)  BP: 128/60 (08-29-18 @ 09:00) (106/71 - 142/66)  RR: 18 (08-29-18 @ 09:00) (15 - 25)  SpO2: 96% (08-29-18 @ 09:00) (92% - 100%)  Wt(kg): --  I&O's Summary    28 Aug 2018 07:01  -  29 Aug 2018 07:00  --------------------------------------------------------  IN: 2395 mL / OUT: 480 mL / NET: 1915 mL    29 Aug 2018 07:01  -  29 Aug 2018 09:50  --------------------------------------------------------  IN: 90 mL / OUT: 40 mL / NET: 50 mL        Appearance: Normal	  Cardiovascular: Normal S1 S2,RRR, No JVD, No murmurs  Respiratory: Lungs clear to auscultation, +chest tube   Gastrointestinal:  Soft, Non-tender, + BS	  Extremities: Normal range of motion, No clubbing, cyanosis or edema        MEDICATIONS  (STANDING):  acetaminophen   Tablet. 650 milliGRAM(s) Oral every 6 hours  buDESOnide 160 MICROgram(s)/formoterol 4.5 MICROgram(s) Inhaler 2 Puff(s) Inhalation two times a day  diltiazem Infusion 5 mG/Hr (5 mL/Hr) IV Continuous <Continuous>  docusate sodium 100 milliGRAM(s) Oral three times a day  famotidine    Tablet 20 milliGRAM(s) Oral daily  heparin  Injectable 5000 Unit(s) SubCutaneous every 8 hours  HYDROmorphone PCA (1 mG/mL) 30 milliLiter(s) PCA Continuous PCA Continuous  ipratropium    for Nebulization 500 MICROGram(s) Nebulizer every 6 hours  lactated ringers. 1000 milliLiter(s) (30 mL/Hr) IV Continuous <Continuous>  levalbuterol Inhalation 0.63 milliGRAM(s) Inhalation every 6 hours  metoprolol tartrate 25 milliGRAM(s) Oral every 12 hours      TELEMETRY: NSR 91, PACs   ECG:  	  RADIOLOGY:   DIAGNOSTIC TESTING:  [ ] Echocardiogram: < from: Transthoracic Echocardiogram (08.28.18 @ 15:54) >  CONCLUSIONS:  1. Mitral annular calcification, otherwise normal mitral  valve. Minimal mitral regurgitation.  2. Aortic valve leaflet morphology not well visualized.  Mild aortic regurgitation.  3. Normal left ventricular internal dimensions and wall  thicknesses.  4. Endocardium not well visualized; grossly normal left  ventricular systolic function.  5. Unable to accurately evaluate right ventricular size or  systolic function.  6. Estimated right ventricular systolic pressure equals 62  mm Hg, assuming right atrial pressure equals 10 mm Hg,  consistent with severe pulmonary hypertension.    < end of copied text >    [ ]  Catheterization:  [ ] Stress Test:    OTHER: 	    LABS:	 	                                13.5   11.74 )-----------( 299      ( 29 Aug 2018 04:45 )             41.2     08-29    135  |  98  |  22  ----------------------------<  109<H>  4.4   |  24  |  0.63    Ca    9.0      29 Aug 2018 04:45  Phos  2.5     08-29  Mg     2.1     08-29    TPro  6.7  /  Alb  3.0<L>  /  TBili  0.4  /  DBili  x   /  AST  25  /  ALT  17  /  AlkPhos  105  08-29          Serene Burdick [facility]

## 2018-08-29 NOTE — PROGRESS NOTE ADULT - SUBJECTIVE AND OBJECTIVE BOX
Anesthesia Pain Management Service    SUBJECTIVE: Patient is doing well with IV PCA and no significant problems reported.    Pain Scale Score	At rest: ___ 	With Activity: ___ 	[X ] Refer to charted pain scores    THERAPY:    [ ] IV PCA Morphine		[ ] 5 mg/mL	[ ] 1 mg/mL  [X ] IV PCA Hydromorphone	[ ] 5 mg/mL	[X ] 1 mg/mL  [ ] IV PCA Fentanyl		[ ] 50 micrograms/mL    Demand dose __0.2_ lockout __6_ (minutes) Continuous Rate _0__ Total: __3_  mg used (in past 24 hours)      MEDICATIONS  (STANDING):  acetaminophen   Tablet. 650 milliGRAM(s) Oral every 6 hours  buDESOnide 160 MICROgram(s)/formoterol 4.5 MICROgram(s) Inhaler 2 Puff(s) Inhalation two times a day  diltiazem Infusion 5 mG/Hr (5 mL/Hr) IV Continuous <Continuous>  docusate sodium 100 milliGRAM(s) Oral three times a day  famotidine    Tablet 20 milliGRAM(s) Oral daily  heparin  Injectable 5000 Unit(s) SubCutaneous every 8 hours  HYDROmorphone PCA (1 mG/mL) 30 milliLiter(s) PCA Continuous PCA Continuous  ipratropium    for Nebulization 500 MICROGram(s) Nebulizer every 6 hours  lactated ringers. 1000 milliLiter(s) (30 mL/Hr) IV Continuous <Continuous>  levalbuterol Inhalation 0.63 milliGRAM(s) Inhalation every 6 hours  metoprolol tartrate 25 milliGRAM(s) Oral every 12 hours    MEDICATIONS  (PRN):  HYDROmorphone PCA (1 mG/mL) Rescue Clinician Bolus 0.5 milliGRAM(s) IV Push every 15 minutes PRN for Pain Scale GREATER THAN 6  naloxone Injectable 0.1 milliGRAM(s) IV Push every 3 minutes PRN For ANY of the following changes in patient status:  A. RR LESS THAN 10 breaths per minute, B. Oxygen saturation LESS THAN 90%, C. Sedation score of 6  ondansetron Injectable 4 milliGRAM(s) IV Push every 6 hours PRN Nausea      OBJECTIVE:    Sedation Score:	[ X] Alert	[ ] Drowsy 	[ ] Arousable	[ ] Asleep	[ ] Unresponsive    Side Effects:	[X ] None	[ ] Nausea	[ ] Vomiting	[ ] Pruritus  		[ ] Other:    Vital Signs Last 24 Hrs  T(C): 37 (29 Aug 2018 08:00), Max: 37 (29 Aug 2018 08:00)  T(F): 98.6 (29 Aug 2018 08:00), Max: 98.6 (29 Aug 2018 08:00)  HR: 84 (29 Aug 2018 09:00) (77 - 151)  BP: 128/60 (29 Aug 2018 09:00) (106/71 - 142/66)  BP(mean): 76 (29 Aug 2018 09:00) (64 - 95)  RR: 18 (29 Aug 2018 09:00) (15 - 25)  SpO2: 96% (29 Aug 2018 09:00) (92% - 100%)    ASSESSMENT/ PLAN    Therapy to  be:	[ X] Continue   [ ] Discontinued   [ ] Change to prn Analgesics    Documentation and Verification of current medications:   [X] Done	[ ] Not done, not elligible    Comments: Tylenol added and discussed with CT ICU attending    Progress Note written now but Patient was seen earlier.

## 2018-08-30 ENCOUNTER — TRANSCRIPTION ENCOUNTER (OUTPATIENT)
Age: 83
End: 2018-08-30

## 2018-08-30 ENCOUNTER — APPOINTMENT (OUTPATIENT)
Dept: THORACIC SURGERY | Facility: HOSPITAL | Age: 83
End: 2018-08-30
Payer: MEDICARE

## 2018-08-30 LAB
BUN SERPL-MCNC: 17 MG/DL — SIGNIFICANT CHANGE UP (ref 7–23)
CA-I BLD-SCNC: 1.21 MMOL/L — SIGNIFICANT CHANGE UP (ref 1.03–1.23)
CALCIUM SERPL-MCNC: 8.3 MG/DL — LOW (ref 8.4–10.5)
CHLORIDE SERPL-SCNC: 97 MMOL/L — LOW (ref 98–107)
CK MB BLD-MCNC: 2.88 NG/ML — SIGNIFICANT CHANGE UP (ref 1–4.7)
CK SERPL-CCNC: 65 U/L — SIGNIFICANT CHANGE UP (ref 25–170)
CO2 SERPL-SCNC: 25 MMOL/L — SIGNIFICANT CHANGE UP (ref 22–31)
CREAT SERPL-MCNC: 0.45 MG/DL — LOW (ref 0.5–1.3)
GLUCOSE SERPL-MCNC: 114 MG/DL — HIGH (ref 70–99)
GRAM STN SPT: SIGNIFICANT CHANGE UP
HCT VFR BLD CALC: 38.4 % — SIGNIFICANT CHANGE UP (ref 34.5–45)
HGB BLD-MCNC: 12.9 G/DL — SIGNIFICANT CHANGE UP (ref 11.5–15.5)
MAGNESIUM SERPL-MCNC: 1.7 MG/DL — SIGNIFICANT CHANGE UP (ref 1.6–2.6)
MCHC RBC-ENTMCNC: 29.9 PG — SIGNIFICANT CHANGE UP (ref 27–34)
MCHC RBC-ENTMCNC: 33.6 % — SIGNIFICANT CHANGE UP (ref 32–36)
MCV RBC AUTO: 89.1 FL — SIGNIFICANT CHANGE UP (ref 80–100)
NRBC # FLD: 0 — SIGNIFICANT CHANGE UP
PHOSPHATE SERPL-MCNC: 2.5 MG/DL — SIGNIFICANT CHANGE UP (ref 2.5–4.5)
PLATELET # BLD AUTO: 281 K/UL — SIGNIFICANT CHANGE UP (ref 150–400)
PMV BLD: 10 FL — SIGNIFICANT CHANGE UP (ref 7–13)
POTASSIUM SERPL-MCNC: 4.2 MMOL/L — SIGNIFICANT CHANGE UP (ref 3.5–5.3)
POTASSIUM SERPL-SCNC: 4.2 MMOL/L — SIGNIFICANT CHANGE UP (ref 3.5–5.3)
RBC # BLD: 4.31 M/UL — SIGNIFICANT CHANGE UP (ref 3.8–5.2)
RBC # FLD: 12.8 % — SIGNIFICANT CHANGE UP (ref 10.3–14.5)
SODIUM SERPL-SCNC: 135 MMOL/L — SIGNIFICANT CHANGE UP (ref 135–145)
SPECIMEN SOURCE: SIGNIFICANT CHANGE UP
T4 FREE SERPL-MCNC: 1.41 NG/DL — SIGNIFICANT CHANGE UP (ref 0.9–1.8)
TROPONIN T, HIGH SENSITIVITY: 9 NG/L — SIGNIFICANT CHANGE UP (ref ?–14)
TSH SERPL-MCNC: 0.87 UIU/ML — SIGNIFICANT CHANGE UP (ref 0.27–4.2)
WBC # BLD: 10.58 K/UL — HIGH (ref 3.8–10.5)
WBC # FLD AUTO: 10.58 K/UL — HIGH (ref 3.8–10.5)

## 2018-08-30 PROCEDURE — 99233 SBSQ HOSP IP/OBS HIGH 50: CPT

## 2018-08-30 PROCEDURE — 71045 X-RAY EXAM CHEST 1 VIEW: CPT | Mod: 26

## 2018-08-30 PROCEDURE — 43752 NASAL/OROGASTRIC W/TUBE PLMT: CPT

## 2018-08-30 PROCEDURE — 99291 CRITICAL CARE FIRST HOUR: CPT

## 2018-08-30 PROCEDURE — 31624 DX BRONCHOSCOPE/LAVAGE: CPT | Mod: 78

## 2018-08-30 RX ORDER — CEFTRIAXONE 500 MG/1
INJECTION, POWDER, FOR SOLUTION INTRAMUSCULAR; INTRAVENOUS
Qty: 0 | Refills: 0 | Status: DISCONTINUED | OUTPATIENT
Start: 2018-08-30 | End: 2018-08-30

## 2018-08-30 RX ORDER — SODIUM CHLORIDE 9 MG/ML
4 INJECTION INTRAMUSCULAR; INTRAVENOUS; SUBCUTANEOUS EVERY 6 HOURS
Qty: 0 | Refills: 0 | Status: COMPLETED | OUTPATIENT
Start: 2018-08-30 | End: 2018-09-02

## 2018-08-30 RX ORDER — CEFTRIAXONE 500 MG/1
1 INJECTION, POWDER, FOR SOLUTION INTRAMUSCULAR; INTRAVENOUS ONCE
Qty: 0 | Refills: 0 | Status: COMPLETED | OUTPATIENT
Start: 2018-08-30 | End: 2018-08-30

## 2018-08-30 RX ORDER — FLUCONAZOLE 150 MG/1
200 TABLET ORAL ONCE
Qty: 0 | Refills: 0 | Status: COMPLETED | OUTPATIENT
Start: 2018-08-30 | End: 2018-08-30

## 2018-08-30 RX ORDER — FLUCONAZOLE 150 MG/1
TABLET ORAL
Qty: 0 | Refills: 0 | Status: DISCONTINUED | OUTPATIENT
Start: 2018-08-30 | End: 2018-09-13

## 2018-08-30 RX ORDER — MAGNESIUM SULFATE 500 MG/ML
2 VIAL (ML) INJECTION ONCE
Qty: 0 | Refills: 0 | Status: COMPLETED | OUTPATIENT
Start: 2018-08-30 | End: 2018-08-30

## 2018-08-30 RX ORDER — VANCOMYCIN HCL 1 G
VIAL (EA) INTRAVENOUS
Qty: 0 | Refills: 0 | Status: DISCONTINUED | OUTPATIENT
Start: 2018-08-30 | End: 2018-09-04

## 2018-08-30 RX ORDER — FLUCONAZOLE 150 MG/1
200 TABLET ORAL EVERY 24 HOURS
Qty: 0 | Refills: 0 | Status: DISCONTINUED | OUTPATIENT
Start: 2018-08-31 | End: 2018-09-13

## 2018-08-30 RX ORDER — VANCOMYCIN HCL 1 G
1000 VIAL (EA) INTRAVENOUS ONCE
Qty: 0 | Refills: 0 | Status: COMPLETED | OUTPATIENT
Start: 2018-08-30 | End: 2018-08-30

## 2018-08-30 RX ORDER — FUROSEMIDE 40 MG
10 TABLET ORAL ONCE
Qty: 0 | Refills: 0 | Status: COMPLETED | OUTPATIENT
Start: 2018-08-30 | End: 2018-08-30

## 2018-08-30 RX ORDER — MEROPENEM 1 G/30ML
1000 INJECTION INTRAVENOUS EVERY 8 HOURS
Qty: 0 | Refills: 0 | Status: DISCONTINUED | OUTPATIENT
Start: 2018-08-30 | End: 2018-09-13

## 2018-08-30 RX ORDER — LIDOCAINE 4 G/100G
1 CREAM TOPICAL ONCE
Qty: 0 | Refills: 0 | Status: COMPLETED | OUTPATIENT
Start: 2018-08-30 | End: 2018-08-30

## 2018-08-30 RX ORDER — VANCOMYCIN HCL 1 G
1000 VIAL (EA) INTRAVENOUS EVERY 24 HOURS
Qty: 0 | Refills: 0 | Status: DISCONTINUED | OUTPATIENT
Start: 2018-08-31 | End: 2018-09-04

## 2018-08-30 RX ORDER — SODIUM CHLORIDE 9 MG/ML
1000 INJECTION INTRAMUSCULAR; INTRAVENOUS; SUBCUTANEOUS
Qty: 0 | Refills: 0 | Status: DISCONTINUED | OUTPATIENT
Start: 2018-08-30 | End: 2018-09-06

## 2018-08-30 RX ORDER — HYDRALAZINE HCL 50 MG
5 TABLET ORAL ONCE
Qty: 0 | Refills: 0 | Status: COMPLETED | OUTPATIENT
Start: 2018-08-30 | End: 2018-08-30

## 2018-08-30 RX ORDER — MIDAZOLAM HYDROCHLORIDE 1 MG/ML
1 INJECTION, SOLUTION INTRAMUSCULAR; INTRAVENOUS ONCE
Qty: 0 | Refills: 0 | Status: DISCONTINUED | OUTPATIENT
Start: 2018-08-30 | End: 2018-08-30

## 2018-08-30 RX ORDER — ACETAMINOPHEN 500 MG
750 TABLET ORAL ONCE
Qty: 0 | Refills: 0 | Status: COMPLETED | OUTPATIENT
Start: 2018-08-30 | End: 2018-08-30

## 2018-08-30 RX ADMIN — Medication 25 MILLIGRAM(S): at 05:50

## 2018-08-30 RX ADMIN — Medication 650 MILLIGRAM(S): at 14:45

## 2018-08-30 RX ADMIN — MEROPENEM 100 MILLIGRAM(S): 1 INJECTION INTRAVENOUS at 13:16

## 2018-08-30 RX ADMIN — LEVALBUTEROL 0.63 MILLIGRAM(S): 1.25 SOLUTION, CONCENTRATE RESPIRATORY (INHALATION) at 22:29

## 2018-08-30 RX ADMIN — FLUCONAZOLE 100 MILLIGRAM(S): 150 TABLET ORAL at 13:56

## 2018-08-30 RX ADMIN — Medication 650 MILLIGRAM(S): at 13:44

## 2018-08-30 RX ADMIN — Medication 50 GRAM(S): at 07:02

## 2018-08-30 RX ADMIN — MIDAZOLAM HYDROCHLORIDE 1 MILLIGRAM(S): 1 INJECTION, SOLUTION INTRAMUSCULAR; INTRAVENOUS at 11:00

## 2018-08-30 RX ADMIN — LEVALBUTEROL 0.63 MILLIGRAM(S): 1.25 SOLUTION, CONCENTRATE RESPIRATORY (INHALATION) at 16:14

## 2018-08-30 RX ADMIN — CEFTRIAXONE 100 GRAM(S): 500 INJECTION, POWDER, FOR SOLUTION INTRAMUSCULAR; INTRAVENOUS at 08:40

## 2018-08-30 RX ADMIN — Medication 10 MILLIGRAM(S): at 21:31

## 2018-08-30 RX ADMIN — Medication 500 MICROGRAM(S): at 16:14

## 2018-08-30 RX ADMIN — Medication 300 MILLIGRAM(S): at 06:00

## 2018-08-30 RX ADMIN — Medication 25 MILLIGRAM(S): at 21:31

## 2018-08-30 RX ADMIN — Medication 5 MILLIGRAM(S): at 04:01

## 2018-08-30 RX ADMIN — Medication 500 MICROGRAM(S): at 04:17

## 2018-08-30 RX ADMIN — Medication 500 MICROGRAM(S): at 22:29

## 2018-08-30 RX ADMIN — Medication 25 MILLIGRAM(S): at 13:46

## 2018-08-30 RX ADMIN — Medication 750 MILLIGRAM(S): at 07:00

## 2018-08-30 RX ADMIN — SODIUM CHLORIDE 4 MILLILITER(S): 9 INJECTION INTRAMUSCULAR; INTRAVENOUS; SUBCUTANEOUS at 16:24

## 2018-08-30 RX ADMIN — HEPARIN SODIUM 5000 UNIT(S): 5000 INJECTION INTRAVENOUS; SUBCUTANEOUS at 21:31

## 2018-08-30 RX ADMIN — LEVALBUTEROL 0.63 MILLIGRAM(S): 1.25 SOLUTION, CONCENTRATE RESPIRATORY (INHALATION) at 04:17

## 2018-08-30 RX ADMIN — Medication 250 MILLIGRAM(S): at 12:01

## 2018-08-30 RX ADMIN — LIDOCAINE 1 PATCH: 4 CREAM TOPICAL at 00:00

## 2018-08-30 RX ADMIN — LIDOCAINE 1 PATCH: 4 CREAM TOPICAL at 07:02

## 2018-08-30 RX ADMIN — SODIUM CHLORIDE 30 MILLILITER(S): 9 INJECTION INTRAMUSCULAR; INTRAVENOUS; SUBCUTANEOUS at 19:13

## 2018-08-30 RX ADMIN — MEROPENEM 100 MILLIGRAM(S): 1 INJECTION INTRAVENOUS at 21:31

## 2018-08-30 RX ADMIN — ONDANSETRON 4 MILLIGRAM(S): 8 TABLET, FILM COATED ORAL at 06:13

## 2018-08-30 RX ADMIN — HEPARIN SODIUM 5000 UNIT(S): 5000 INJECTION INTRAVENOUS; SUBCUTANEOUS at 05:45

## 2018-08-30 RX ADMIN — SODIUM CHLORIDE 4 MILLILITER(S): 9 INJECTION INTRAMUSCULAR; INTRAVENOUS; SUBCUTANEOUS at 22:40

## 2018-08-30 RX ADMIN — HEPARIN SODIUM 5000 UNIT(S): 5000 INJECTION INTRAVENOUS; SUBCUTANEOUS at 13:46

## 2018-08-30 NOTE — PROGRESS NOTE ADULT - SUBJECTIVE AND OBJECTIVE BOX
CARDIOLOGY FOLLOW UP - Dr. Arevalo - for koss     CC - no chest pain/sob, denies dizziness, palpitations       PHYSICAL EXAM:  T(C): 36.7 (08-30-18 @ 04:00), Max: 37.2 (08-29-18 @ 16:00)  HR: 95 (08-30-18 @ 07:00) (79 - 108)  BP: 146/68 (08-30-18 @ 07:00) (103/88 - 172/94)  RR: 27 (08-30-18 @ 07:00) (14 - 27)  SpO2: 91% (08-30-18 @ 07:00) (89% - 100%)  Wt(kg): --  I&O's Summary    29 Aug 2018 07:01  -  30 Aug 2018 07:00  --------------------------------------------------------  IN: 1880 mL / OUT: 3150 mL / NET: -1270 mL    30 Aug 2018 07:01  -  30 Aug 2018 09:34  --------------------------------------------------------  IN: 0 mL / OUT: 20 mL / NET: -20 mL        Appearance: Normal	  Cardiovascular: Normal S1 S2,RRR, No JVD, No murmurs  Respiratory: Lungs clear to auscultation, Right chest tube in place   Gastrointestinal:  Soft, Non-tender, + BS	  Extremities: Normal range of motion, No clubbing, cyanosis or edema        MEDICATIONS  (STANDING):  acetaminophen   Tablet. 650 milliGRAM(s) Oral every 6 hours  buDESOnide 160 MICROgram(s)/formoterol 4.5 MICROgram(s) Inhaler 2 Puff(s) Inhalation two times a day  cefTRIAXone   IVPB      docusate sodium 100 milliGRAM(s) Oral three times a day  dornase selina Solution 2.5 milliGRAM(s) Inhalation daily  famotidine    Tablet 20 milliGRAM(s) Oral daily  heparin  Injectable 5000 Unit(s) SubCutaneous every 8 hours  HYDROmorphone PCA (1 mG/mL) 30 milliLiter(s) PCA Continuous PCA Continuous  ipratropium    for Nebulization 500 MICROGram(s) Nebulizer every 6 hours  lactated ringers. 1000 milliLiter(s) (30 mL/Hr) IV Continuous <Continuous>  levalbuterol Inhalation 0.63 milliGRAM(s) Inhalation every 6 hours  metoprolol tartrate 25 milliGRAM(s) Oral every 8 hours  sodium chloride 3%  Inhalation 4 milliLiter(s) Inhalation every 6 hours      TELEMETRY: NSR 90s 	    ECG:  	  RADIOLOGY:   DIAGNOSTIC TESTING:  [ ] Echocardiogram:  [ ]  Catheterization:  [ ] Stress Test:    OTHER: 	    LABS:	 	                                12.9   10.58 )-----------( 281      ( 30 Aug 2018 04:30 )             38.4     08-30    135  |  97<L>  |  17  ----------------------------<  114<H>  4.2   |  25  |  0.45<L>    Ca    8.3<L>      30 Aug 2018 04:30  Phos  2.5     08-30  Mg     1.7     08-30    TPro  6.7  /  Alb  3.0<L>  /  TBili  0.4  /  DBili  x   /  AST  25  /  ALT  17  /  AlkPhos  105  08-29 CARDIOLOGY FOLLOW UP - Dr. Arevalo - for koss     CC -  denies dizziness, palpitations   pt. c/o of mid sternal chest pain this am, non radiating. no SOB, nausea.     PHYSICAL EXAM:  T(C): 36.7 (08-30-18 @ 04:00), Max: 37.2 (08-29-18 @ 16:00)  HR: 95 (08-30-18 @ 07:00) (79 - 108)  BP: 146/68 (08-30-18 @ 07:00) (103/88 - 172/94)  RR: 27 (08-30-18 @ 07:00) (14 - 27)  SpO2: 91% (08-30-18 @ 07:00) (89% - 100%)  Wt(kg): --  I&O's Summary    29 Aug 2018 07:01  -  30 Aug 2018 07:00  --------------------------------------------------------  IN: 1880 mL / OUT: 3150 mL / NET: -1270 mL    30 Aug 2018 07:01  -  30 Aug 2018 09:34  --------------------------------------------------------  IN: 0 mL / OUT: 20 mL / NET: -20 mL        Appearance: Normal	  Cardiovascular: Normal S1 S2,RRR, No JVD, No murmurs  Respiratory: Lungs clear to auscultation, Right chest tube in place   Gastrointestinal:  Soft, Non-tender, + BS	  Extremities: Normal range of motion, No clubbing, cyanosis or edema        MEDICATIONS  (STANDING):  acetaminophen   Tablet. 650 milliGRAM(s) Oral every 6 hours  buDESOnide 160 MICROgram(s)/formoterol 4.5 MICROgram(s) Inhaler 2 Puff(s) Inhalation two times a day  cefTRIAXone   IVPB      docusate sodium 100 milliGRAM(s) Oral three times a day  dornase selina Solution 2.5 milliGRAM(s) Inhalation daily  famotidine    Tablet 20 milliGRAM(s) Oral daily  heparin  Injectable 5000 Unit(s) SubCutaneous every 8 hours  HYDROmorphone PCA (1 mG/mL) 30 milliLiter(s) PCA Continuous PCA Continuous  ipratropium    for Nebulization 500 MICROGram(s) Nebulizer every 6 hours  lactated ringers. 1000 milliLiter(s) (30 mL/Hr) IV Continuous <Continuous>  levalbuterol Inhalation 0.63 milliGRAM(s) Inhalation every 6 hours  metoprolol tartrate 25 milliGRAM(s) Oral every 8 hours  sodium chloride 3%  Inhalation 4 milliLiter(s) Inhalation every 6 hours      TELEMETRY: NSR 90s 	    ECG:  	  RADIOLOGY:   DIAGNOSTIC TESTING:  [ ] Echocardiogram:  [ ]  Catheterization:  [ ] Stress Test:    OTHER: 	    LABS:	 	                                12.9   10.58 )-----------( 281      ( 30 Aug 2018 04:30 )             38.4     08-30    135  |  97<L>  |  17  ----------------------------<  114<H>  4.2   |  25  |  0.45<L>    Ca    8.3<L>      30 Aug 2018 04:30  Phos  2.5     08-30  Mg     1.7     08-30    TPro  6.7  /  Alb  3.0<L>  /  TBili  0.4  /  DBili  x   /  AST  25  /  ALT  17  /  AlkPhos  105  08-29

## 2018-08-30 NOTE — PROGRESS NOTE ADULT - ASSESSMENT
83 y.o. female with hx of COPD, no significant cardiac history presenting with intraop- postop SVT s/p Right upper lobectomy.     1. Intraop/postop SVT  s/p Right upper lobectomy, found to be in SVT responsive to IV CCB   after starting ccb gtt patient was found to have intermittent 3 sec pauses on tele.   brief episodes of afib/flutter intermittently noted on tele, some pauses noted on tele   cont metoprolol 25mg BID, if develops longer pauses on bb, may need PPM  Echo with normal LV function   chadsvasc 3 - start on anticoagulation when cleared by CTS   EP f/u     2. S/p Right upper lobectomy   Care per CTICU     3. COPD  stable on inhalers     dvt ppx 83 y.o. female with hx of COPD, no significant cardiac history presenting with intraop- postop SVT s/p Right upper lobectomy.     1. Intraop/postop SVT  s/p Right upper lobectomy, found to be in SVT responsive to IV CCB   after starting ccb gtt patient was found to have intermittent 3 sec pauses on tele.   brief episodes of afib/flutter intermittently noted on tele, some pauses noted on tele   cont with bb, if develops longer pauses on bb, may need PPM  Echo with normal LV function   chadsvasc 3 - start on anticoagulation when cleared by CTS   EP f/u     2. Chest pain   pt. c/o of mid sternal chest pain this am   possibly referred pain from chest tube   check EKG, CE     3. HTN  bp noted to be elevated   bb increased to TID   care per [facility]     4. S/p Right upper lobectomy   Care per [facility]     5. COPD  stable on inhalers     dvt ppx

## 2018-08-30 NOTE — PROCEDURE NOTE - PROCEDURE
<<-----Click on this checkbox to enter Procedure Flexible bronchoscopy by surgeon at bedside  08/30/2018  Flexible bronchoscopy and bronchoalveolar lavage  Active  [id number]

## 2018-08-30 NOTE — PROGRESS NOTE ADULT - SUBJECTIVE AND OBJECTIVE BOX
Anesthesia Pain Management Service    SUBJECTIVE: Pt doing well with IV PCA without problems reported.    Therapy:	  [ X] IV PCA	   [ ] Epidural           [ ] s/p Spinal Opoid              [ ] Postpartum infusion	  [ ] Patient controlled regional anesthesia (PCRA)    [ ] prn Analgesics    Allergies    No Known Allergies    Intolerances      MEDICATIONS  (STANDING):  acetaminophen   Tablet. 650 milliGRAM(s) Oral every 6 hours  docusate sodium 100 milliGRAM(s) Oral three times a day  dornase selina Solution 2.5 milliGRAM(s) Inhalation daily  famotidine    Tablet 20 milliGRAM(s) Oral daily  fluconAZOLE IVPB      fluconAZOLE IVPB 200 milliGRAM(s) IV Intermittent once  heparin  Injectable 5000 Unit(s) SubCutaneous every 8 hours  HYDROmorphone PCA (1 mG/mL) 30 milliLiter(s) PCA Continuous PCA Continuous  ipratropium    for Nebulization 500 MICROGram(s) Nebulizer every 6 hours  levalbuterol Inhalation 0.63 milliGRAM(s) Inhalation every 6 hours  meropenem  IVPB 1000 milliGRAM(s) IV Intermittent every 8 hours  metoprolol tartrate 25 milliGRAM(s) Oral every 8 hours  sodium chloride 0.9%. 1000 milliLiter(s) (30 mL/Hr) IV Continuous <Continuous>  sodium chloride 3%  Inhalation 4 milliLiter(s) Inhalation every 6 hours  vancomycin  IVPB        MEDICATIONS  (PRN):  HYDROmorphone PCA (1 mG/mL) Rescue Clinician Bolus 0.5 milliGRAM(s) IV Push every 15 minutes PRN for Pain Scale GREATER THAN 6  naloxone Injectable 0.1 milliGRAM(s) IV Push every 3 minutes PRN For ANY of the following changes in patient status:  A. RR LESS THAN 10 breaths per minute, B. Oxygen saturation LESS THAN 90%, C. Sedation score of 6  ondansetron Injectable 4 milliGRAM(s) IV Push every 6 hours PRN Nausea      OBJECTIVE:   [X] No new signs     [ ] Other:    Side Effects:  [X ] None			[ ] Other:    Assessment of Catheter Site:		[ ] Intact		[ ] Other:    ASSESSMENT/PLAN  [ X] Continue current therapy    [ ] Therapy changed to:    [ ] IV PCA       [ ] Epidural     [ ] prn Analgesics     Comments:

## 2018-08-30 NOTE — SWALLOW BEDSIDE ASSESSMENT ADULT - COMMENTS
Patient is an 83 year old female, former smoker with COPD with recent REED and weight loss, found to have RUL nodule concerning for malignancy, now s/p RUL lobectomy, with subsequent development of recurrent SVT, found to be in afib/flutter intermittently. Patient received in lethargic state after receiving sedation s/p bronchoscopy earlier today. Defer swallow evaluation until tomorrow as per discussion with MD and nurse manager. SLP to follow up.

## 2018-08-30 NOTE — PROGRESS NOTE ADULT - SUBJECTIVE AND OBJECTIVE BOX
Date of Admission:    24H hour events:     MEDICATIONS:  heparin  Injectable 5000 Unit(s) SubCutaneous every 8 hours  metoprolol tartrate 25 milliGRAM(s) Oral every 8 hours  fluconAZOLE IVPB      meropenem  IVPB 1000 milliGRAM(s) IV Intermittent every 8 hours  vancomycin  IVPB      dornase selina Solution 2.5 milliGRAM(s) Inhalation daily  ipratropium    for Nebulization 500 MICROGram(s) Nebulizer every 6 hours  levalbuterol Inhalation 0.63 milliGRAM(s) Inhalation every 6 hours  sodium chloride 3%  Inhalation 4 milliLiter(s) Inhalation every 6 hours  acetaminophen   Tablet. 650 milliGRAM(s) Oral every 6 hours  HYDROmorphone PCA (1 mG/mL) 30 milliLiter(s) PCA Continuous PCA Continuous  HYDROmorphone PCA (1 mG/mL) Rescue Clinician Bolus 0.5 milliGRAM(s) IV Push every 15 minutes PRN  ondansetron Injectable 4 milliGRAM(s) IV Push every 6 hours PRN  docusate sodium 100 milliGRAM(s) Oral three times a day  famotidine    Tablet 20 milliGRAM(s) Oral daily  lactated ringers. 1000 milliLiter(s) IV Continuous <Continuous>      PHYSICAL EXAM:  T(C): 36.7 (08-30-18 @ 09:00), Max: 37.2 (08-29-18 @ 16:00)  HR: 106 (08-30-18 @ 11:00) (79 - 108)  BP: 185/107 (08-30-18 @ 11:00) (115/66 - 185/107)  RR: 19 (08-30-18 @ 11:00) (14 - 27)  SpO2: 94% (08-30-18 @ 11:00) (89% - 100%)  Wt(kg): --  I&O's Summary    29 Aug 2018 07:01  -  30 Aug 2018 07:00  --------------------------------------------------------  IN: 1880 mL / OUT: 3150 mL / NET: -1270 mL    30 Aug 2018 07:01  -  30 Aug 2018 12:12  --------------------------------------------------------  IN: 170 mL / OUT: 305 mL / NET: -135 mL        Appearance: Normal	  HEENT:   Normal oral mucosa, PERRL, EOMI	  Lymphatic: No lymphadenopathy  Cardiovascular: Normal S1 S2, No JVD, No murmurs, No edema  Respiratory: Lungs clear to auscultation	  Psychiatry: A & O x 3, Mood & affect appropriate  Gastrointestinal:  Soft, Non-tender, + BS	  Skin: No rashes, No ecchymoses, No cyanosis	  Neurologic: Non-focal  Extremities: Normal range of motion, No clubbing, cyanosis or edema  Vascular: Peripheral pulses palpable 2+ bilaterally        LABS:	 	  CBC Full  -  ( 30 Aug 2018 04:30 )  WBC Count : 10.58 K/uL  Hemoglobin : 12.9 g/dL  Hematocrit : 38.4 %  Platelet Count - Automated : 281 K/uL      08-30    135  |  97<L>  |  17  ----------------------------<  114<H>  4.2   |  25  |  0.45<L>  08-29    135  |  98  |  22  ----------------------------<  109<H>  4.4   |  24  |  0.63    Ca    8.3<L>      30 Aug 2018 04:30  Ca    9.0      29 Aug 2018 04:45  Phos  2.5     08-30  Phos  2.5     08-29  Mg     1.7     08-30  Mg     2.1     08-29    TPro  6.7  /  Alb  3.0<L>  /  TBili  0.4  /  DBili  x   /  AST  25  /  ALT  17  /  AlkPhos  105  08-29    CARDIAC MARKERS:  CKMB: 2.88 ng/mL (08-30-18 @ 10:15)        TELEMETRY: no afib/flutter noted, no pauses, frequent PACs	    Echo 8/28/18  1. Mitral annular calcification, otherwise normal mitral  valve. Minimal mitral regurgitation.  2. Aortic valve leaflet morphology not well visualized.  Mild aortic regurgitation.  3. Normal left ventricular internal dimensions and wall  thicknesses.  4. Endocardium not well visualized; grossly normal left  ventricular systolic function.  5. Unable to accurately evaluate right ventricular size or  systolic function.  6. Estimated right ventricular systolic pressure equals 62  mm Hg, assuming right atrial pressure equals 10 mm Hg,  consistent with severe pulmonary hypertension.    ASSESSMENT/PLAN: 	  Patient is an 82 yo F former smoker with COPD with recent REED and weight loss found to have RUL nodule concerning for malignancy now s/p RUL lobectomy, with subsequent development of recurrent SVT, found to be in afib/flutter intermittently.    Paroxsymal Afib/flutter  - no further afib/flutter noted, no pauses, but still with frequent PACs  - metop changed to 25mg q8h yesterday - uptitrate as tolerated to control PACs -> if develops long/symptomatic pauses on metop, may need PPM  - chadsvasc 3 - start on anticoagulation when able  - keep K>4, Mg>2      Prakash Corona MD  Cardiology Fellow

## 2018-08-30 NOTE — CONSULT NOTE ADULT - SUBJECTIVE AND OBJECTIVE BOX
Patient is a 83y old  Female who presents with a chief complaint of     HPI:  83 y.o. female with hx of COPD, no significant cardiac history presenting with intraop- postop SVT s/p Right upper lobectomy.  Pt has been c/o fatigue, weight loss of 15 lbs in last 6 months, and reports occasional nonproductive cough.  CXR and subsequent CT scan, reports multiple right lung nodules.  Pt admitted  for lung resection.  Pt underwent lobectomy of right lung  with video-assisted thoracoscopic surgery  on 8/27/2018 and flexible bronchoscopy with bronchopulmonary lavage.  She has a right chest tube in place.      Post op course complicated by SVT and A.fib with pauses and pneumothorax.         Pt currently without fever.  WBC 14.1 on 8/28 --> 10.5.  Pt was given a dose of rocephin on 8/30 for possible pneumonia.  ID consult called for further antibiotic managment.            REVIEW OF SYSTEMS:    CONSTITUTIONAL: No fever, weight loss, or fatigue  EYES: No eye pain, visual disturbances, or discharge  ENMT:  No sore throat  NECK: No pain or stiffness  RESPIRATORY: No cough, wheezing, chills or hemoptysis; No shortness of breath  CARDIOVASCULAR: No chest pain, palpitations, dizziness, or leg swelling  GASTROINTESTINAL: No abdominal or epigastric pain. No nausea, vomiting, or hematemesis; No diarrhea or constipation. No melena or hematochezia.  GENITOURINARY: No dysuria, frequency, hematuria, or incontinence  NEUROLOGICAL: No headaches, memory loss, loss of strength, numbness, or tremors  SKIN: No itching, burning, rashes, or lesions   LYMPH NODES: No enlarged glands  MUSCULOSKELETAL: No joint pain or swelling; No muscle, back, or extremity pain      PAST MEDICAL & SURGICAL HISTORY:  Lung nodules  COPD (Chronic Obstructive Pulmonary Disease): diagnose in ~2013  Colon Polyps  History of Osteoporosis  History of Cataract Surgery: left eye  Hip Replacement: bilateral      Allergies    No Known Allergies    Intolerances        FAMILY HISTORY:  Family history of liver cancer (Sibling)  Family history of lung cancer (Father): mesothelioma of lung      SOCIAL HISTORY:        MEDICATIONS  (STANDING):  acetaminophen   Tablet. 650 milliGRAM(s) Oral every 6 hours  docusate sodium 100 milliGRAM(s) Oral three times a day  dornase selina Solution 2.5 milliGRAM(s) Inhalation daily  famotidine    Tablet 20 milliGRAM(s) Oral daily  heparin  Injectable 5000 Unit(s) SubCutaneous every 8 hours  HYDROmorphone PCA (1 mG/mL) 30 milliLiter(s) PCA Continuous PCA Continuous  ipratropium    for Nebulization 500 MICROGram(s) Nebulizer every 6 hours  lactated ringers. 1000 milliLiter(s) (30 mL/Hr) IV Continuous <Continuous>  levalbuterol Inhalation 0.63 milliGRAM(s) Inhalation every 6 hours  metoprolol tartrate 25 milliGRAM(s) Oral every 8 hours  sodium chloride 3%  Inhalation 4 milliLiter(s) Inhalation every 6 hours  vancomycin  IVPB 1000 milliGRAM(s) IV Intermittent once    MEDICATIONS  (PRN):  HYDROmorphone PCA (1 mG/mL) Rescue Clinician Bolus 0.5 milliGRAM(s) IV Push every 15 minutes PRN for Pain Scale GREATER THAN 6  naloxone Injectable 0.1 milliGRAM(s) IV Push every 3 minutes PRN For ANY of the following changes in patient status:  A. RR LESS THAN 10 breaths per minute, B. Oxygen saturation LESS THAN 90%, C. Sedation score of 6  ondansetron Injectable 4 milliGRAM(s) IV Push every 6 hours PRN Nausea      Vital Signs Last 24 Hrs  T(C): 36.7 (30 Aug 2018 09:00), Max: 37.2 (29 Aug 2018 16:00)  T(F): 98 (30 Aug 2018 09:00), Max: 98.9 (29 Aug 2018 16:00)  HR: 106 (30 Aug 2018 11:00) (79 - 108)  BP: 185/107 (30 Aug 2018 11:00) (103/88 - 185/107)  BP(mean): 124 (30 Aug 2018 11:00) (68 - 124)  RR: 19 (30 Aug 2018 11:00) (14 - 27)  SpO2: 94% (30 Aug 2018 11:00) (89% - 100%)    PHYSICAL EXAM:    GENERAL: NAD, well-groomed  HEAD:  Atraumatic, Normocephalic  EYES: EOMI, PERRLA, conjunctiva and sclera clear  ENMT: No tonsillar erythema, exudates, or enlargement; Moist mucous membranes  NECK: Supple, No JVD  CHEST/LUNG: Clear to percussion bilaterally; No rales, rhonchi, wheezing, or rubs  HEART: Regular rate and rhythm; No murmurs, rubs, or gallops  ABDOMEN: Soft, Nontender, Nondistended; Bowel sounds present  EXTREMITIES:  2+ Peripheral Pulses, No clubbing, cyanosis, or edema  LYMPH: No lymphadenopathy noted  SKIN: No rashes or lesions    LABS:  CBC Full  -  ( 30 Aug 2018 04:30 )  WBC Count : 10.58 K/uL  Hemoglobin : 12.9 g/dL  Hematocrit : 38.4 %  Platelet Count - Automated : 281 K/uL  Mean Cell Volume : 89.1 fL  Mean Cell Hemoglobin : 29.9 pg  Mean Cell Hemoglobin Concentration : 33.6 %  Auto Neutrophil # : x  Auto Lymphocyte # : x  Auto Monocyte # : x  Auto Eosinophil # : x  Auto Basophil # : x  Auto Neutrophil % : x  Auto Lymphocyte % : x  Auto Monocyte % : x  Auto Eosinophil % : x  Auto Basophil % : x      08-30    135  |  97<L>  |  17  ----------------------------<  114<H>  4.2   |  25  |  0.45<L>    Ca    8.3<L>      30 Aug 2018 04:30  Phos  2.5     08-30  Mg     1.7     08-30    TPro  6.7  /  Alb  3.0<L>  /  TBili  0.4  /  DBili  x   /  AST  25  /  ALT  17  /  AlkPhos  105  08-29      LIVER FUNCTIONS - ( 29 Aug 2018 04:45 )  Alb: 3.0 g/dL / Pro: 6.7 g/dL / ALK PHOS: 105 u/L / ALT: 17 u/L / AST: 25 u/L / GGT: x                               MICROBIOLOGY:                    RADIOLOGY: Patient is a 83y old  Female who presents with a chief complaint of     HPI:  83 y.o. female with hx of COPD, no significant cardiac history presenting with intraop- postop SVT s/p Right upper lobectomy.  Pt has been c/o fatigue, weight loss of 15 lbs in last 6 months, and reports occasional nonproductive cough.  CXR and subsequent CT scan, reports multiple right lung nodules.  Pt admitted  for lung resection.  Pt underwent lobectomy of right lung  with video-assisted thoracoscopic surgery  on 8/27/2018 and flexible bronchoscopy with bronchopulmonary lavage.  She has a right chest tube in place.      Post op course complicated by SVT and A.fib with pauses and pneumothorax.         Pt currently without fever.  WBC 14.1 on 8/28 --> 10.5.  Pt was given a dose of rocephin on 8/30 for possible pneumonia.  During bedside bronchoscopy pt noted to have copious murky secretions concerning for infection and oropharyngeal candidiases.  ID consult called for further antibiotic managment.            REVIEW OF SYSTEMS:    Pt lethargic, nonverbal, unable to assess      PAST MEDICAL & SURGICAL HISTORY:  Lung nodules  COPD (Chronic Obstructive Pulmonary Disease): diagnose in ~2013  Colon Polyps  History of Osteoporosis  History of Cataract Surgery: left eye  Hip Replacement: bilateral      Allergies    No Known Allergies    Intolerances        FAMILY HISTORY:  Family history of liver cancer (Sibling)  Family history of lung cancer (Father): mesothelioma of lung      SOCIAL HISTORY:  Unavailable      MEDICATIONS  (STANDING):  acetaminophen   Tablet. 650 milliGRAM(s) Oral every 6 hours  docusate sodium 100 milliGRAM(s) Oral three times a day  dornase selina Solution 2.5 milliGRAM(s) Inhalation daily  famotidine    Tablet 20 milliGRAM(s) Oral daily  heparin  Injectable 5000 Unit(s) SubCutaneous every 8 hours  HYDROmorphone PCA (1 mG/mL) 30 milliLiter(s) PCA Continuous PCA Continuous  ipratropium    for Nebulization 500 MICROGram(s) Nebulizer every 6 hours  lactated ringers. 1000 milliLiter(s) (30 mL/Hr) IV Continuous <Continuous>  levalbuterol Inhalation 0.63 milliGRAM(s) Inhalation every 6 hours  metoprolol tartrate 25 milliGRAM(s) Oral every 8 hours  sodium chloride 3%  Inhalation 4 milliLiter(s) Inhalation every 6 hours  vancomycin  IVPB 1000 milliGRAM(s) IV Intermittent once    MEDICATIONS  (PRN):  HYDROmorphone PCA (1 mG/mL) Rescue Clinician Bolus 0.5 milliGRAM(s) IV Push every 15 minutes PRN for Pain Scale GREATER THAN 6  naloxone Injectable 0.1 milliGRAM(s) IV Push every 3 minutes PRN For ANY of the following changes in patient status:  A. RR LESS THAN 10 breaths per minute, B. Oxygen saturation LESS THAN 90%, C. Sedation score of 6  ondansetron Injectable 4 milliGRAM(s) IV Push every 6 hours PRN Nausea      Vital Signs Last 24 Hrs  T(C): 36.7 (30 Aug 2018 09:00), Max: 37.2 (29 Aug 2018 16:00)  T(F): 98 (30 Aug 2018 09:00), Max: 98.9 (29 Aug 2018 16:00)  HR: 106 (30 Aug 2018 11:00) (79 - 108)  BP: 185/107 (30 Aug 2018 11:00) (103/88 - 185/107)  BP(mean): 124 (30 Aug 2018 11:00) (68 - 124)  RR: 19 (30 Aug 2018 11:00) (14 - 27)  SpO2: 94% (30 Aug 2018 11:00) (89% - 100%)    PHYSICAL EXAM:    GENERAL: lethargic, cachectic  HEAD:  Atraumatic, Normocephalic  EYES: EOMI, PERRLA, conjunctiva and sclera clear  ENMT: No tonsillar erythema, exudates, or enlargement; Moist mucous membranes  NECK: Supple, No JVD  CHEST/LUNG: Clear to percussion bilaterally; No rales, rhonchi, wheezing, or rubs  HEART: Regular rate and rhythm; No murmurs, rubs, or gallops  ABDOMEN: Soft, Nontender, Nondistended; Bowel sounds present  EXTREMITIES:  2+ Peripheral Pulses, No clubbing, cyanosis, or edema  LYMPH: No lymphadenopathy noted  SKIN: No rashes or lesions    LABS:  CBC Full  -  ( 30 Aug 2018 04:30 )  WBC Count : 10.58 K/uL  Hemoglobin : 12.9 g/dL  Hematocrit : 38.4 %  Platelet Count - Automated : 281 K/uL  Mean Cell Volume : 89.1 fL  Mean Cell Hemoglobin : 29.9 pg  Mean Cell Hemoglobin Concentration : 33.6 %  Auto Neutrophil # : x  Auto Lymphocyte # : x  Auto Monocyte # : x  Auto Eosinophil # : x  Auto Basophil # : x  Auto Neutrophil % : x  Auto Lymphocyte % : x  Auto Monocyte % : x  Auto Eosinophil % : x  Auto Basophil % : x      08-30    135  |  97<L>  |  17  ----------------------------<  114<H>  4.2   |  25  |  0.45<L>    Ca    8.3<L>      30 Aug 2018 04:30  Phos  2.5     08-30  Mg     1.7     08-30    TPro  6.7  /  Alb  3.0<L>  /  TBili  0.4  /  DBili  x   /  AST  25  /  ALT  17  /  AlkPhos  105  08-29      LIVER FUNCTIONS - ( 29 Aug 2018 04:45 )  Alb: 3.0 g/dL / Pro: 6.7 g/dL / ALK PHOS: 105 u/L / ALT: 17 u/L / AST: 25 u/L / GGT: x                               MICROBIOLOGY:                    RADIOLOGY:    < from: Xray Chest 1 View- PORTABLE-Urgent (08.29.18 @ 13:34) >  IMPRESSION: There is a right sided chest tube as on the prior study.   There is increased lucency of the right upper hemithorax likely   representing a pneumothorax which may have increased in size since the   prior study performed earlier on the same day. There is a small left   pleural effusion with left lung base atelectasis. There is a small right   pleural effusion with more confluent consolidation at the right lung base   which may represent pneumonia unchanged.    < end of copied text >

## 2018-08-30 NOTE — PROGRESS NOTE ADULT - SUBJECTIVE AND OBJECTIVE BOX
LIAM VELOZ            [id number]         No Known Allergies               83 y.o. female with hx of COPD, c/o SOB, dyspnea on exertion, fatigue, weight loss of 15 lbs in last 6 months, reports occasional nonproductive cough, s/p CXR, followed by CT scan, reports multiple right lung nodules noted admitted  for lung resection.      Procedure:  RVATS /   Right upper lobectomy   08/27/2018       Issues:   Shortness of breath / Bronchospasm / COPD exacerbation  SVT / a-FIB  Pneumothorax  Air leak              Postop pain  HTN                 Home Medications:  Advair Diskus 250 mcg-50 mcg inhalation powder: 1 puff(s) inhaled 2 times a day (27 Aug 2018 12:42)  Spiriva 18 mcg inhalation capsule: 1 cap(s) inhaled once a day (27 Aug 2018 12:42)      PAST MEDICAL & SURGICAL HISTORY:  Lung nodules  COPD (Chronic Obstructive Pulmonary Disease): diagnose in ~2013  Colon Polyps  History of Osteoporosis  History of Cataract Surgery: left eye  Hip Replacement: bilateral        ICU Vital Signs Last 24 Hrs  T(C): 36.7 (30 Aug 2018 00:00), Max: 37.2 (29 Aug 2018 16:00)  T(F): 98 (30 Aug 2018 00:00), Max: 98.9 (29 Aug 2018 16:00)  HR: 82 (30 Aug 2018 04:17) (79 - 108)  BP: 172/84 (30 Aug 2018 03:00) (103/88 - 172/84)  BP(mean): 106 (30 Aug 2018 03:00) (68 - 106)  ABP: --  ABP(mean): --  RR: 19 (30 Aug 2018 03:00) (14 - 26)  SpO2: 96% (30 Aug 2018 04:17) (89% - 100%)    I&O's Detail    28 Aug 2018 07:01  -  29 Aug 2018 07:00  --------------------------------------------------------  IN:    diltiazem Infusion: 35 mL    IV PiggyBack: 200 mL    lactated ringers.: 720 mL    Oral Fluid: 1440 mL  Total IN: 2395 mL    OUT:    Chest Tube: 480 mL  Total OUT: 480 mL    Total NET: 1915 mL      29 Aug 2018 07:01  -  30 Aug 2018 05:01  --------------------------------------------------------  IN:    IV PiggyBack: 75 mL    lactated ringers.: 600 mL    Oral Fluid: 960 mL  Total IN: 1635 mL    OUT:    Chest Tube: 500 mL    Indwelling Catheter - Urethral: 1715 mL    Voided: 250 mL  Total OUT: 2465 mL    Total NET: -830 mL        CAPILLARY BLOOD GLUCOSE          Home Medications:  Advair Diskus 250 mcg-50 mcg inhalation powder: 1 puff(s) inhaled 2 times a day (27 Aug 2018 12:42)  Spiriva 18 mcg inhalation capsule: 1 cap(s) inhaled once a day (27 Aug 2018 12:42)      MEDICATIONS  (STANDING):  acetaminophen   Tablet. 650 milliGRAM(s) Oral every 6 hours  buDESOnide 160 MICROgram(s)/formoterol 4.5 MICROgram(s) Inhaler 2 Puff(s) Inhalation two times a day  docusate sodium 100 milliGRAM(s) Oral three times a day  dornase selina Solution 2.5 milliGRAM(s) Inhalation daily  famotidine    Tablet 20 milliGRAM(s) Oral daily  heparin  Injectable 5000 Unit(s) SubCutaneous every 8 hours  HYDROmorphone PCA (1 mG/mL) 30 milliLiter(s) PCA Continuous PCA Continuous  ipratropium    for Nebulization 500 MICROGram(s) Nebulizer every 6 hours  lactated ringers. 1000 milliLiter(s) (30 mL/Hr) IV Continuous <Continuous>  levalbuterol Inhalation 0.63 milliGRAM(s) Inhalation every 6 hours  metoprolol tartrate 25 milliGRAM(s) Oral every 8 hours    MEDICATIONS  (PRN):  HYDROmorphone PCA (1 mG/mL) Rescue Clinician Bolus 0.5 milliGRAM(s) IV Push every 15 minutes PRN for Pain Scale GREATER THAN 6  naloxone Injectable 0.1 milliGRAM(s) IV Push every 3 minutes PRN For ANY of the following changes in patient status:  A. RR LESS THAN 10 breaths per minute, B. Oxygen saturation LESS THAN 90%, C. Sedation score of 6  ondansetron Injectable 4 milliGRAM(s) IV Push every 6 hours PRN Nausea          Physical exam:                 General:               Pt is awake, alert, C/O shortness of breath but not in any distress                                                  Neuro:                  Nonfocal                             Cardiovascular:     S1 & S2, regular / irregular                          Respiratory:         Air entry is fair and equal on both sides, has bilateral rhonchi /  conducted sounds                           GI:                         Soft, nondistended and nontender, Bowel sounds active                            Ext:                        No cyanosis or edema             Labs:                                                                           12.9   10.58 )-----------( 281      ( 30 Aug 2018 04:30 )             38.4             08-29    135  |  98  |  22  ----------------------------<  109<H>  4.4   |  24  |  0.63    Ca    9.0      29 Aug 2018 04:45  Phos  2.5     08-29  Mg     2.1     08-29    TPro  6.7  /  Alb  3.0<L>  /  TBili  0.4  /  DBili  x   /  AST  25  /  ALT  17  /  AlkPhos  105  08-29                    LIVER FUNCTIONS - ( 29 Aug 2018 04:45 )  Alb: 3.0 g/dL / Pro: 6.7 g/dL / ALK PHOS: 105 u/L / ALT: 17 u/L / AST: 25 u/L / GGT: x               CXR:    There is a right sided chest tube as on the prior study.   There is increased lucency of the right upper hemithorax likely   representing a pneumothorax which may have increased in size since the   prior study performed earlier on the same day. There is a small left   pleural effusion with left lung base atelectasis. There is a small right   pleural effusion with more confluent consolidation at the right lung base   which may represent pneumonia unchanged.      Plan:    General: 83yFemale s/p RVATS /   Right upper lobectomy   08/27/2018 experiencing  pain with deep breathing. Postop period is significant for SVT / A-Fib. Pt had episodes of pauses on Cardizem EPS evaluated and recommended Lopressor 25mg bid & PPM if experiences pauses again.                            Neuro:                                         Pain control with  Tylenol IV                            Cardiovascular:                                          Continue hemodynamic monitoring.    SVT / A-fb: EPS evaluated and recommended Lopressor 25mg bid, increase as tolerated &  PPM if experiences pauses again.     HTN: in 180s, received hydralazine IVP x 1. Increased lopressor to 25mg q8hrs                            Respiratory:                                              Pt is on 2L  nasal canula,                                           mild SOB, not in any distress.                                         Using incentive spirometry                                          Monitor chest tube output                                         Pneumothorax with large air leak. Dropped lung when put on water seal, Chest tube back  to suction -20CM. Has large air leak. Monitor clinically and daily CXR                                                                 COPD: Continue  Xopenex / Atrovent nebulizer Rx.                             GI                                         On regular diet                                         Continue GI prophylaxis with  Protonix                                         Continue Zofran / Reglan for nausea - PRN	                                                                 Renal:                                         Continue LR 30cc/hr                                         Monitor I/Os and electrolytes                                                                                        Hem/ Onc:                                                                                  Monitor chest tube output &  signs of bleeding.                                          Follow CBC in AM                           Infectious disease:                                            No signs of infection. Monitor for fever / leukocytosis.                                          All surgical incision / chest tube  sites look clean                            Endocrine                                             Continue Accu-Checks with coverage    Pt is on SQ Heparin and Venodyne boots for DVT prophylaxis.     Pertinent clinical, laboratory, radiographic, hemodynamic, echocardiographic, respiratory data, microbiologic data and chart were reviewed and analyzed frequently throughout the course of the day and night  Patient seen, examined and plan discussed with CT Surgeon / CTICU team during rounds.    Pt's status discussed with family at bedside, updated status    I have spent  45   minutes of critical care time with this pt between  00am  and  8am             Michael Moya MD LIAM VELOZ            [id number]         No Known Allergies               83 y.o. female with hx of COPD, c/o SOB, dyspnea on exertion, fatigue, weight loss of 15 lbs in last 6 months, reports occasional nonproductive cough, s/p CXR, followed by CT scan, reports multiple right lung nodules noted admitted  for lung resection.      Procedure:  RVATS /   Right upper lobectomy   08/27/2018       Issues:   Shortness of breath / Bronchospasm / COPD exacerbation  SVT / a-FIB  Pneumothorax  Air leak              Postop pain  HTN                 Home Medications:  Advair Diskus 250 mcg-50 mcg inhalation powder: 1 puff(s) inhaled 2 times a day (27 Aug 2018 12:42)  Spiriva 18 mcg inhalation capsule: 1 cap(s) inhaled once a day (27 Aug 2018 12:42)      PAST MEDICAL & SURGICAL HISTORY:  Lung nodules  COPD (Chronic Obstructive Pulmonary Disease): diagnose in ~2013  Colon Polyps  History of Osteoporosis  History of Cataract Surgery: left eye  Hip Replacement: bilateral        ICU Vital Signs Last 24 Hrs  T(C): 36.7 (30 Aug 2018 00:00), Max: 37.2 (29 Aug 2018 16:00)  T(F): 98 (30 Aug 2018 00:00), Max: 98.9 (29 Aug 2018 16:00)  HR: 82 (30 Aug 2018 04:17) (79 - 108)  BP: 172/84 (30 Aug 2018 03:00) (103/88 - 172/84)  BP(mean): 106 (30 Aug 2018 03:00) (68 - 106)  ABP: --  ABP(mean): --  RR: 19 (30 Aug 2018 03:00) (14 - 26)  SpO2: 96% (30 Aug 2018 04:17) (89% - 100%)    I&O's Detail    28 Aug 2018 07:01  -  29 Aug 2018 07:00  --------------------------------------------------------  IN:    diltiazem Infusion: 35 mL    IV PiggyBack: 200 mL    lactated ringers.: 720 mL    Oral Fluid: 1440 mL  Total IN: 2395 mL    OUT:    Chest Tube: 480 mL  Total OUT: 480 mL    Total NET: 1915 mL      29 Aug 2018 07:01  -  30 Aug 2018 05:01  --------------------------------------------------------  IN:    IV PiggyBack: 75 mL    lactated ringers.: 600 mL    Oral Fluid: 960 mL  Total IN: 1635 mL    OUT:    Chest Tube: 500 mL    Indwelling Catheter - Urethral: 1715 mL    Voided: 250 mL  Total OUT: 2465 mL    Total NET: -830 mL        CAPILLARY BLOOD GLUCOSE          Home Medications:  Advair Diskus 250 mcg-50 mcg inhalation powder: 1 puff(s) inhaled 2 times a day (27 Aug 2018 12:42)  Spiriva 18 mcg inhalation capsule: 1 cap(s) inhaled once a day (27 Aug 2018 12:42)      MEDICATIONS  (STANDING):  acetaminophen   Tablet. 650 milliGRAM(s) Oral every 6 hours  buDESOnide 160 MICROgram(s)/formoterol 4.5 MICROgram(s) Inhaler 2 Puff(s) Inhalation two times a day  docusate sodium 100 milliGRAM(s) Oral three times a day  dornase selina Solution 2.5 milliGRAM(s) Inhalation daily  famotidine    Tablet 20 milliGRAM(s) Oral daily  heparin  Injectable 5000 Unit(s) SubCutaneous every 8 hours  HYDROmorphone PCA (1 mG/mL) 30 milliLiter(s) PCA Continuous PCA Continuous  ipratropium    for Nebulization 500 MICROGram(s) Nebulizer every 6 hours  lactated ringers. 1000 milliLiter(s) (30 mL/Hr) IV Continuous <Continuous>  levalbuterol Inhalation 0.63 milliGRAM(s) Inhalation every 6 hours  metoprolol tartrate 25 milliGRAM(s) Oral every 8 hours    MEDICATIONS  (PRN):  HYDROmorphone PCA (1 mG/mL) Rescue Clinician Bolus 0.5 milliGRAM(s) IV Push every 15 minutes PRN for Pain Scale GREATER THAN 6  naloxone Injectable 0.1 milliGRAM(s) IV Push every 3 minutes PRN For ANY of the following changes in patient status:  A. RR LESS THAN 10 breaths per minute, B. Oxygen saturation LESS THAN 90%, C. Sedation score of 6  ondansetron Injectable 4 milliGRAM(s) IV Push every 6 hours PRN Nausea          Physical exam:                 General:               Pt is awake, alert, C/O shortness of breath but not in any distress                                                  Neuro:                  Nonfocal                             Cardiovascular:     S1 & S2, regular / irregular                          Respiratory:         Air entry is fair and equal on both sides, has bilateral rhonchi /  conducted sounds                           GI:                         Soft, nondistended and nontender, Bowel sounds active                            Ext:                        No cyanosis or edema             Labs:                                                                           12.9   10.58 )-----------( 281      ( 30 Aug 2018 04:30 )             38.4             08-29    135  |  98  |  22  ----------------------------<  109<H>  4.4   |  24  |  0.63    Ca    9.0      29 Aug 2018 04:45  Phos  2.5     08-29  Mg     2.1     08-29    TPro  6.7  /  Alb  3.0<L>  /  TBili  0.4  /  DBili  x   /  AST  25  /  ALT  17  /  AlkPhos  105  08-29                    LIVER FUNCTIONS - ( 29 Aug 2018 04:45 )  Alb: 3.0 g/dL / Pro: 6.7 g/dL / ALK PHOS: 105 u/L / ALT: 17 u/L / AST: 25 u/L / GGT: x               CXR:    08/30  Rotated film, right lower consolidation vs atelectasis. Small right apical pneumo. - Official report pending    08/29:   There is a right sided chest tube as on the prior study.   There is increased lucency of the right upper hemithorax likely   representing a pneumothorax which may have increased in size since the   prior study performed earlier on the same day. There is a small left   pleural effusion with left lung base atelectasis. There is a small right   pleural effusion with more confluent consolidation at the right lung base   which may represent pneumonia unchanged.      Plan:    General: 83yFemale s/p RVATS /   Right upper lobectomy   08/27/2018 experiencing  pain with deep breathing. Postop period is significant for SVT / A-Fib. Pt had episodes of pauses on Cardizem EPS evaluated and recommended Lopressor 25mg bid & PPM if experiences pauses again.                            Neuro:                                         Pain control with  Tylenol IV                            Cardiovascular:                                          Continue hemodynamic monitoring.    SVT / A-fb: EPS evaluated and recommended Lopressor 25mg bid, increase as tolerated &  PPM if experiences pauses again.     HTN: in 180s, received hydralazine IVP x 1. Increased lopressor to 25mg q8hrs                            Respiratory:                                              Pt is on 2L  nasal canula,       AM CXR -  rt lower atelectasis - Continue aggressive chest PT / Pulmozyme / 3% saline inhalation / Xopenex and Atrovent nebs                                         mild SOB, not in any distress.                                         Using incentive spirometry 500cc                                         Monitor chest tube output                                         Pneumothorax with large air leak. Dropped lung when put on water seal, Chest tube back  to suction -20cm. Has large air leak. Monitor clinically and repeat  CXR  later                                                               COPD: Continue  Xopenex / Atrovent nebulizer Rx.                             GI                                         NPO except meds for possible bronch                                         Continue GI prophylaxis with  Protonix                                         Continue Zofran / Reglan for nausea - PRN	                                                                 Renal:                                         Continue LR 30cc/hr                                         Monitor I/Os and electrolytes                                                                                        Hem/ Onc:                                                                                  Monitor chest tube output &  signs of bleeding.                                          Follow CBC in AM                           Infectious disease:                                            No signs of infection. Monitor for fever / leukocytosis.                                          All surgical incision / chest tube  sites look clean                            Endocrine                                             Continue Accu-Checks with coverage    Pt is on SQ Heparin and Venodyne boots for DVT prophylaxis.     Pertinent clinical, laboratory, radiographic, hemodynamic, echocardiographic, respiratory data, microbiologic data and chart were reviewed and analyzed frequently throughout the course of the day and night  Patient seen, examined and plan discussed with CT Surgeon / CTICU team during rounds.    Pt's status discussed with family at bedside, updated status    I have spent  45   minutes of critical care time with this pt between  00am  and  8am             Michael Moya MD

## 2018-08-30 NOTE — CONSULT NOTE ADULT - ASSESSMENT
83 y.o. female with hx of COPD, no significant cardiac history presenting with intraop- postop SVT s/p Right upper lobectomy.  Pt has been c/o fatigue, weight loss of 15 lbs in last 6 months, and reports occasional nonproductive cough.  CXR and subsequent CT scan, reports multiple right lung nodules.  Pt admitted  for lung resection.  Pt underwent lobectomy of right lung  with video-assisted thoracoscopic surgery  on 8/27/2018 and flexible bronchoscopy with bronchopulmonary lavage.  She has a right chest tube in place.      Post op course complicated by SVT and A.fib with pauses and pneumothorax.         Pt currently without fever.  WBC 14.1 on 8/28 --> 10.5.  Pt was given a dose of rocephin on 8/30 for possible pneumonia.  During bedside bronchoscopy pt noted to have copious murky secretions concerning for infection and oropharyngeal candidiases.  ID consult called for further antibiotic managment.     Problem/Plan - 1:    ·	Pna    - Pt s/p RUL lobectomy, s/p bronch/lavage, chest tube to suction.  Noted to have copious purulent secretions and oropharyngeal candidiasis.      - Recommend broad coverage, s/p dose of rocephin.  Will start vanco/meropenem and diflucan.     -  f/u vanco trough prior to 3rd dose.    - Respiratory cultures sent.  If patient spikes fever, send blood cultures x 2.    - Serial chest xrays.    - Chest tube management as per CTS    Will follow,    Margareth Hodge  [phone]    d/w CT icu team

## 2018-08-30 NOTE — PROGRESS NOTE ADULT - SUBJECTIVE AND OBJECTIVE BOX
Day _4__ of Anesthesia Pain Management Service        SUBJECTIVE: pt. sleeping RN states no complaints over night     Pain Scale Score	At rest: ___ 	With Activity: ___ 	[ ] Refer to charted pain scores    THERAPY:    [ ] IV PCA Morphine		[ ] 5 mg/mL	[ ] 1 mg/mL  [X] IV PCA Hydromorphone	[ ] 5 mg/mL	[X] 1 mg/mL  [ ] IV PCA Fentanyl		[ ] 50 micrograms/mL    Demand dose _0.2mg_ lockout _6_ (minutes) Continuous Rate _0_ Total: _3.6mg__  Daily      MEDICATIONS  (STANDING):  acetaminophen   Tablet. 650 milliGRAM(s) Oral every 6 hours  buDESOnide 160 MICROgram(s)/formoterol 4.5 MICROgram(s) Inhaler 2 Puff(s) Inhalation two times a day  cefTRIAXone   IVPB 1 Gram(s) IV Intermittent once  cefTRIAXone   IVPB      docusate sodium 100 milliGRAM(s) Oral three times a day  dornase selina Solution 2.5 milliGRAM(s) Inhalation daily  famotidine    Tablet 20 milliGRAM(s) Oral daily  heparin  Injectable 5000 Unit(s) SubCutaneous every 8 hours  HYDROmorphone PCA (1 mG/mL) 30 milliLiter(s) PCA Continuous PCA Continuous  ipratropium    for Nebulization 500 MICROGram(s) Nebulizer every 6 hours  lactated ringers. 1000 milliLiter(s) (30 mL/Hr) IV Continuous <Continuous>  levalbuterol Inhalation 0.63 milliGRAM(s) Inhalation every 6 hours  metoprolol tartrate 25 milliGRAM(s) Oral every 8 hours  sodium chloride 3%  Inhalation 4 milliLiter(s) Inhalation every 6 hours    MEDICATIONS  (PRN):  HYDROmorphone PCA (1 mG/mL) Rescue Clinician Bolus 0.5 milliGRAM(s) IV Push every 15 minutes PRN for Pain Scale GREATER THAN 6  naloxone Injectable 0.1 milliGRAM(s) IV Push every 3 minutes PRN For ANY of the following changes in patient status:  A. RR LESS THAN 10 breaths per minute, B. Oxygen saturation LESS THAN 90%, C. Sedation score of 6  ondansetron Injectable 4 milliGRAM(s) IV Push every 6 hours PRN Nausea      OBJECTIVE: A&Ox3, NAD, supine in bed    Sedation Score:	[X] Alert	[ ] Drowsy	[ ] Arousable	[ ] Asleep	[ ] Unresponsive    Side Effects:	[X] None	[ ] Nausea	[ ] Vomiting	[ ] Pruritus  		  [ ] Weakness		[ ] Numbness	[ ] Other:                              12.9   10.58 )-----------( 281      ( 30 Aug 2018 04:30 )             38.4       08-30    135  |  97<L>  |  17  ----------------------------<  114<H>  4.2   |  25  |  0.45<L>    Ca    8.3<L>      30 Aug 2018 04:30  Phos  2.5     08-30  Mg     1.7     08-30    TPro  6.7  /  Alb  3.0<L>  /  TBili  0.4  /  DBili  x   /  AST  25  /  ALT  17  /  AlkPhos  105  08-29      ASSESSMENT/ PLAN    Therapy to  be:	[X] Continue   [ ] Discontinued   [ ] Change to prn Analgesics    Documentation and Verification of current medications:  [X] Done	[ ] Not done, not eligible  [ ] Not done, reason not given    [ ]  [place]  Reviewed and Copied to Chart    Comments: continue current therapy

## 2018-08-31 ENCOUNTER — TRANSCRIPTION ENCOUNTER (OUTPATIENT)
Age: 83
End: 2018-08-31

## 2018-08-31 ENCOUNTER — APPOINTMENT (OUTPATIENT)
Dept: THORACIC SURGERY | Facility: HOSPITAL | Age: 83
End: 2018-08-31
Payer: MEDICARE

## 2018-08-31 LAB
BUN SERPL-MCNC: 17 MG/DL — SIGNIFICANT CHANGE UP (ref 7–23)
CA-I BLD-SCNC: 1.12 MMOL/L — SIGNIFICANT CHANGE UP (ref 1.03–1.23)
CALCIUM SERPL-MCNC: 8.7 MG/DL — SIGNIFICANT CHANGE UP (ref 8.4–10.5)
CHLORIDE SERPL-SCNC: 94 MMOL/L — LOW (ref 98–107)
CO2 SERPL-SCNC: 27 MMOL/L — SIGNIFICANT CHANGE UP (ref 22–31)
CREAT SERPL-MCNC: 0.44 MG/DL — LOW (ref 0.5–1.3)
GLUCOSE SERPL-MCNC: 146 MG/DL — HIGH (ref 70–99)
GRAM STN FLD: SIGNIFICANT CHANGE UP
GRAM STN SPT: SIGNIFICANT CHANGE UP
HCT VFR BLD CALC: 41.5 % — SIGNIFICANT CHANGE UP (ref 34.5–45)
HGB BLD-MCNC: 14.2 G/DL — SIGNIFICANT CHANGE UP (ref 11.5–15.5)
MAGNESIUM SERPL-MCNC: 1.9 MG/DL — SIGNIFICANT CHANGE UP (ref 1.6–2.6)
MCHC RBC-ENTMCNC: 30.7 PG — SIGNIFICANT CHANGE UP (ref 27–34)
MCHC RBC-ENTMCNC: 34.2 % — SIGNIFICANT CHANGE UP (ref 32–36)
MCV RBC AUTO: 89.8 FL — SIGNIFICANT CHANGE UP (ref 80–100)
NRBC # FLD: 0 — SIGNIFICANT CHANGE UP
PHOSPHATE SERPL-MCNC: 3 MG/DL — SIGNIFICANT CHANGE UP (ref 2.5–4.5)
PLATELET # BLD AUTO: 331 K/UL — SIGNIFICANT CHANGE UP (ref 150–400)
PMV BLD: 10.1 FL — SIGNIFICANT CHANGE UP (ref 7–13)
POTASSIUM SERPL-MCNC: 4.3 MMOL/L — SIGNIFICANT CHANGE UP (ref 3.5–5.3)
POTASSIUM SERPL-SCNC: 4.3 MMOL/L — SIGNIFICANT CHANGE UP (ref 3.5–5.3)
RBC # BLD: 4.62 M/UL — SIGNIFICANT CHANGE UP (ref 3.8–5.2)
RBC # FLD: 12.9 % — SIGNIFICANT CHANGE UP (ref 10.3–14.5)
SODIUM SERPL-SCNC: 133 MMOL/L — LOW (ref 135–145)
SPECIMEN SOURCE: SIGNIFICANT CHANGE UP
SPECIMEN SOURCE: SIGNIFICANT CHANGE UP
TRIGL FLD-MCNC: 26 MG/DL — SIGNIFICANT CHANGE UP
WBC # BLD: 13.56 K/UL — HIGH (ref 3.8–10.5)
WBC # FLD AUTO: 13.56 K/UL — HIGH (ref 3.8–10.5)

## 2018-08-31 PROCEDURE — 71045 X-RAY EXAM CHEST 1 VIEW: CPT | Mod: 26

## 2018-08-31 PROCEDURE — 99291 CRITICAL CARE FIRST HOUR: CPT

## 2018-08-31 PROCEDURE — 31624 DX BRONCHOSCOPE/LAVAGE: CPT | Mod: 78

## 2018-08-31 PROCEDURE — 99232 SBSQ HOSP IP/OBS MODERATE 35: CPT

## 2018-08-31 RX ORDER — SODIUM CHLORIDE 9 MG/ML
250 INJECTION, SOLUTION INTRAVENOUS ONCE
Qty: 0 | Refills: 0 | Status: DISCONTINUED | OUTPATIENT
Start: 2018-08-31 | End: 2018-08-31

## 2018-08-31 RX ORDER — ACETAMINOPHEN 500 MG
1000 TABLET ORAL ONCE
Qty: 0 | Refills: 0 | Status: COMPLETED | OUTPATIENT
Start: 2018-08-31 | End: 2018-08-31

## 2018-08-31 RX ORDER — SODIUM CHLORIDE 9 MG/ML
250 INJECTION INTRAMUSCULAR; INTRAVENOUS; SUBCUTANEOUS ONCE
Qty: 0 | Refills: 0 | Status: COMPLETED | OUTPATIENT
Start: 2018-08-31 | End: 2018-08-31

## 2018-08-31 RX ORDER — MAGNESIUM SULFATE 500 MG/ML
2 VIAL (ML) INJECTION ONCE
Qty: 0 | Refills: 0 | Status: COMPLETED | OUTPATIENT
Start: 2018-08-31 | End: 2018-08-31

## 2018-08-31 RX ORDER — ACETAMINOPHEN 500 MG
1000 TABLET ORAL ONCE
Qty: 0 | Refills: 0 | Status: DISCONTINUED | OUTPATIENT
Start: 2018-08-31 | End: 2018-09-09

## 2018-08-31 RX ADMIN — SODIUM CHLORIDE 30 MILLILITER(S): 9 INJECTION INTRAMUSCULAR; INTRAVENOUS; SUBCUTANEOUS at 21:53

## 2018-08-31 RX ADMIN — Medication 100 MILLIGRAM(S): at 14:45

## 2018-08-31 RX ADMIN — Medication 650 MILLIGRAM(S): at 18:49

## 2018-08-31 RX ADMIN — DORNASE ALFA 2.5 MILLIGRAM(S): 1 SOLUTION RESPIRATORY (INHALATION) at 10:00

## 2018-08-31 RX ADMIN — FLUCONAZOLE 100 MILLIGRAM(S): 150 TABLET ORAL at 11:55

## 2018-08-31 RX ADMIN — Medication 650 MILLIGRAM(S): at 19:00

## 2018-08-31 RX ADMIN — LEVALBUTEROL 0.63 MILLIGRAM(S): 1.25 SOLUTION, CONCENTRATE RESPIRATORY (INHALATION) at 17:29

## 2018-08-31 RX ADMIN — MEROPENEM 100 MILLIGRAM(S): 1 INJECTION INTRAVENOUS at 14:46

## 2018-08-31 RX ADMIN — SODIUM CHLORIDE 4 MILLILITER(S): 9 INJECTION INTRAMUSCULAR; INTRAVENOUS; SUBCUTANEOUS at 10:00

## 2018-08-31 RX ADMIN — Medication 500 MICROGRAM(S): at 17:29

## 2018-08-31 RX ADMIN — HEPARIN SODIUM 5000 UNIT(S): 5000 INJECTION INTRAVENOUS; SUBCUTANEOUS at 14:46

## 2018-08-31 RX ADMIN — Medication 1000 MILLIGRAM(S): at 06:15

## 2018-08-31 RX ADMIN — SODIUM CHLORIDE 4 MILLILITER(S): 9 INJECTION INTRAMUSCULAR; INTRAVENOUS; SUBCUTANEOUS at 09:57

## 2018-08-31 RX ADMIN — Medication 500 MICROGRAM(S): at 21:20

## 2018-08-31 RX ADMIN — Medication 400 MILLIGRAM(S): at 06:00

## 2018-08-31 RX ADMIN — SODIUM CHLORIDE 4 MILLILITER(S): 9 INJECTION INTRAMUSCULAR; INTRAVENOUS; SUBCUTANEOUS at 21:35

## 2018-08-31 RX ADMIN — HYDROMORPHONE HYDROCHLORIDE 30 MILLILITER(S): 2 INJECTION INTRAMUSCULAR; INTRAVENOUS; SUBCUTANEOUS at 07:37

## 2018-08-31 RX ADMIN — SODIUM CHLORIDE 600 MILLILITER(S): 9 INJECTION INTRAMUSCULAR; INTRAVENOUS; SUBCUTANEOUS at 06:00

## 2018-08-31 RX ADMIN — LEVALBUTEROL 0.63 MILLIGRAM(S): 1.25 SOLUTION, CONCENTRATE RESPIRATORY (INHALATION) at 09:57

## 2018-08-31 RX ADMIN — Medication 400 MILLIGRAM(S): at 12:00

## 2018-08-31 RX ADMIN — HEPARIN SODIUM 5000 UNIT(S): 5000 INJECTION INTRAVENOUS; SUBCUTANEOUS at 21:53

## 2018-08-31 RX ADMIN — LEVALBUTEROL 0.63 MILLIGRAM(S): 1.25 SOLUTION, CONCENTRATE RESPIRATORY (INHALATION) at 21:20

## 2018-08-31 RX ADMIN — Medication 250 MILLIGRAM(S): at 13:00

## 2018-08-31 RX ADMIN — MEROPENEM 100 MILLIGRAM(S): 1 INJECTION INTRAVENOUS at 05:03

## 2018-08-31 RX ADMIN — Medication 25 MILLIGRAM(S): at 14:46

## 2018-08-31 RX ADMIN — HEPARIN SODIUM 5000 UNIT(S): 5000 INJECTION INTRAVENOUS; SUBCUTANEOUS at 05:03

## 2018-08-31 RX ADMIN — DORNASE ALFA 2.5 MILLIGRAM(S): 1 SOLUTION RESPIRATORY (INHALATION) at 09:57

## 2018-08-31 RX ADMIN — SODIUM CHLORIDE 4 MILLILITER(S): 9 INJECTION INTRAMUSCULAR; INTRAVENOUS; SUBCUTANEOUS at 17:30

## 2018-08-31 RX ADMIN — Medication 500 MICROGRAM(S): at 04:13

## 2018-08-31 RX ADMIN — Medication 500 MICROGRAM(S): at 09:57

## 2018-08-31 RX ADMIN — Medication 25 MILLIGRAM(S): at 21:53

## 2018-08-31 RX ADMIN — LEVALBUTEROL 0.63 MILLIGRAM(S): 1.25 SOLUTION, CONCENTRATE RESPIRATORY (INHALATION) at 04:17

## 2018-08-31 RX ADMIN — Medication 50 GRAM(S): at 06:54

## 2018-08-31 RX ADMIN — SODIUM CHLORIDE 4 MILLILITER(S): 9 INJECTION INTRAMUSCULAR; INTRAVENOUS; SUBCUTANEOUS at 04:19

## 2018-08-31 RX ADMIN — MEROPENEM 100 MILLIGRAM(S): 1 INJECTION INTRAVENOUS at 21:52

## 2018-08-31 RX ADMIN — Medication 1000 MILLIGRAM(S): at 12:15

## 2018-08-31 RX ADMIN — Medication 25 MILLIGRAM(S): at 05:03

## 2018-08-31 NOTE — PROGRESS NOTE ADULT - SUBJECTIVE AND OBJECTIVE BOX
No events overnight. Patient had bronch yesterday and undergoing another bronch today. Started on broad spectrum abx.     No CP, SOB, palpitations.    MEDICATIONS:  heparin  Injectable 5000 Unit(s) SubCutaneous every 8 hours  metoprolol tartrate 25 milliGRAM(s) Oral every 8 hours  fluconAZOLE IVPB      fluconAZOLE IVPB 200 milliGRAM(s) IV Intermittent every 24 hours  meropenem  IVPB 1000 milliGRAM(s) IV Intermittent every 8 hours  vancomycin  IVPB 1000 milliGRAM(s) IV Intermittent every 24 hours  vancomycin  IVPB      dornase selina Solution 2.5 milliGRAM(s) Inhalation daily  ipratropium    for Nebulization 500 MICROGram(s) Nebulizer every 6 hours  levalbuterol Inhalation 0.63 milliGRAM(s) Inhalation every 6 hours  sodium chloride 3%  Inhalation 4 milliLiter(s) Inhalation every 6 hours  acetaminophen   Tablet. 650 milliGRAM(s) Oral every 6 hours  acetaminophen  IVPB. 1000 milliGRAM(s) IV Intermittent once  docusate sodium 100 milliGRAM(s) Oral three times a day  famotidine    Tablet 20 milliGRAM(s) Oral daily  sodium chloride 0.9%. 1000 milliLiter(s) IV Continuous <Continuous>        PHYSICAL EXAM:  T(C): 36.8 (08-31-18 @ 12:00), Max: 37 (08-31-18 @ 08:00)  HR: 103 (08-31-18 @ 12:00) (83 - 113)  BP: 141/65 (08-31-18 @ 11:00) (107/92 - 168/81)  RR: 22 (08-31-18 @ 12:00) (17 - 27)  SpO2: 94% (08-31-18 @ 12:00) (92% - 100%)  Wt(kg): --  I&O's Summary    30 Aug 2018 07:01  -  31 Aug 2018 07:00  --------------------------------------------------------  IN: 1960 mL / OUT: 1825 mL / NET: 135 mL    31 Aug 2018 07:01  -  31 Aug 2018 13:57  --------------------------------------------------------  IN: 150 mL / OUT: 230 mL / NET: -80 mL    Physical exam  Appearance: normal, fatigued  Resp: no respiratory distress noted  Ext: no edema noted  Full exam not completed as pt was getting bronchoscopy    LABS:	 	    CBC Full  -  ( 31 Aug 2018 03:45 )  WBC Count : 13.56 K/uL  Hemoglobin : 14.2 g/dL  Hematocrit : 41.5 %  Platelet Count - Automated : 331 K/uL    08-31    133<L>  |  94<L>  |  17  ----------------------------<  146<H>  4.3   |  27  |  0.44<L>  08-30    135  |  97<L>  |  17  ----------------------------<  114<H>  4.2   |  25  |  0.45<L>    Ca    8.7      31 Aug 2018 03:15  Ca    8.3<L>      30 Aug 2018 04:30  Phos  3.0     08-31  Phos  2.5     08-30  Mg     1.9     08-31  Mg     1.7     08-30    TELEMETRY: 	  no fib/flutter, no pauses, frequent PACs    ASSESSMENT/PLAN: 	  Patient is an 82 yo F former smoker with COPD with recent REED and weight loss found to have RUL nodule concerning for malignancy now s/p RUL lobectomy, with subsequent development of recurrent SVT, found to be in afib/flutter intermittently.    Paroxsymal Afib/flutter  - no further afib/flutter noted, no pauses, but still with frequent PACs  - continue metop 25mg q8h  - continue to monitor on tele, if develops long/symptomatic pauses on metop, may need PPM  - chadsvasc 3 - start on anticoagulation when able  - keep K>4, Mg>2    Discussed with attending.     Prakash Corona MD  Cardiology Fellow

## 2018-08-31 NOTE — PHYSICAL THERAPY INITIAL EVALUATION ADULT - MANUAL MUSCLE TESTING RESULTS, REHAB EVAL
shoulder ROM not formally assessed, Bilateral UE strength grossly 3/5 throughout, bilateral LE muscle strength grossly 3/5 throughout.

## 2018-08-31 NOTE — PROGRESS NOTE ADULT - SUBJECTIVE AND OBJECTIVE BOX
Anesthesia Pain Management Service    SUBJECTIVE: Patient is doing well with IV PCA and no significant problems reported.    Pain Scale Score	At rest: __3_ 	With Activity: ___ 	[X ] Refer to charted pain scores    THERAPY:    [ ] IV PCA Morphine		[ ] 5 mg/mL	[ ] 1 mg/mL  [X ] IV PCA Hydromorphone	[ ] 5 mg/mL	[X ] 1 mg/mL  [ ] IV PCA Fentanyl		[ ] 50 micrograms/mL    Demand dose __0.2_ lockout __6_ (minutes) Continuous Rate _0__ Total: __1.6_   mg used (in past 24 hrs)      MEDICATIONS  (STANDING):  acetaminophen   Tablet. 650 milliGRAM(s) Oral every 6 hours  docusate sodium 100 milliGRAM(s) Oral three times a day  dornase selina Solution 2.5 milliGRAM(s) Inhalation daily  famotidine    Tablet 20 milliGRAM(s) Oral daily  fluconAZOLE IVPB      fluconAZOLE IVPB 200 milliGRAM(s) IV Intermittent every 24 hours  heparin  Injectable 5000 Unit(s) SubCutaneous every 8 hours  ipratropium    for Nebulization 500 MICROGram(s) Nebulizer every 6 hours  levalbuterol Inhalation 0.63 milliGRAM(s) Inhalation every 6 hours  meropenem  IVPB 1000 milliGRAM(s) IV Intermittent every 8 hours  metoprolol tartrate 25 milliGRAM(s) Oral every 8 hours  sodium chloride 0.9%. 1000 milliLiter(s) (30 mL/Hr) IV Continuous <Continuous>  sodium chloride 3%  Inhalation 4 milliLiter(s) Inhalation every 6 hours  vancomycin  IVPB 1000 milliGRAM(s) IV Intermittent every 24 hours  vancomycin  IVPB        MEDICATIONS  (PRN):      OBJECTIVE: laying in bed     Sedation Score:	[ X] Alert	[ ] Drowsy 	[ ] Arousable	[ ] Asleep	[ ] Unresponsive    Side Effects:	[X ] None	[ ] Nausea	[ ] Vomiting	[ ] Pruritus  		[ ] Other:    Vital Signs Last 24 Hrs  T(C): 36.7 (31 Aug 2018 04:00), Max: 36.8 (30 Aug 2018 20:00)  T(F): 98.1 (31 Aug 2018 04:00), Max: 98.3 (30 Aug 2018 20:00)  HR: 89 (31 Aug 2018 07:00) (83 - 113)  BP: 120/61 (31 Aug 2018 07:00) (107/92 - 185/107)  BP(mean): 72 (31 Aug 2018 07:00) (70 - 124)  RR: 22 (31 Aug 2018 07:00) (17 - 27)  SpO2: 97% (31 Aug 2018 07:00) (92% - 98%)    ASSESSMENT/ PLAN    Therapy to  be:	[ ] Continue   [ X] Discontinued   [X ] Change to prn Analgesics    Documentation and Verification of current medications:   [X] Done	[ ] Not done, not elligible    Comments: PRN Oral/IV opioids and/or Adjuvant medication to be ordered at this point.

## 2018-08-31 NOTE — PHYSICAL THERAPY INITIAL EVALUATION ADULT - ADDITIONAL COMMENTS
(+) steps at home to negotiate with handrail x 1. Pt. returned seated at edge of  bed with all tubes/lines intact, call bell in reach and in NAD. RN bedside

## 2018-08-31 NOTE — PROGRESS NOTE ADULT - ASSESSMENT
83 y.o. female with hx of COPD, no significant cardiac history presenting with intraop- postop SVT s/p Right upper lobectomy.  Pt has been c/o fatigue, weight loss of 15 lbs in last 6 months, and reports occasional nonproductive cough.  CXR and subsequent CT scan, reports multiple right lung nodules.  Pt admitted  for lung resection.  Pt underwent lobectomy of right lung  with video-assisted thoracoscopic surgery  on 8/27/2018 and flexible bronchoscopy with bronchopulmonary lavage.  She has a right chest tube in place.      Post op course complicated by SVT and A.fib with pauses and pneumothorax.         Pt currently without fever.  WBC 14.1 on 8/28 --> 10.5.  Pt was given a dose of rocephin on 8/30 for possible pneumonia.  During bedside bronchoscopy pt noted to have copious murky secretions concerning for infection and oropharyngeal candidiases.  ID consult called for further antibiotic managment.     Problem/Plan - 1:    ·	Pna    - Pt s/p RUL lobectomy, s/p bronch/lavage, chest tube to suction.  Noted to have copious purulent secretions and oropharyngeal candidiasis.      - Recommend broad coverage, cont vanco/meropenem and diflucan.     -  f/u vanco trough prior to 3rd dose.    - Respiratory cultures sent from 8/30 and 8/31.  If patient spikes fever, send blood cultures x 2.    - Serial chest xrays.    - Chest tube management as per CTS    Will follow,    Margareth Hodge  [phone]    d/w CT icu team

## 2018-08-31 NOTE — PHYSICAL THERAPY INITIAL EVALUATION ADULT - PRECAUTIONS/LIMITATIONS, REHAB EVAL
sternal precautions/oxygen therapy device and L/min/surgical precautions/(+) o2 via Nasal cannula (+) chest tube (+) soriano catheter (+) NG tube (+)telemetry (+) IV

## 2018-08-31 NOTE — PHYSICAL THERAPY INITIAL EVALUATION ADULT - RANGE OF MOTION EXAMINATION, REHAB EVAL
bilateral lower extremity ROM was WFL (within functional limits)/bilateral shoulder range of motion not formally assessed secondary to post surgical precautions. Bilateral elbow,hand and wrist range of motion WFL. bilateral LE range of motion WFL./bilateral upper extremity ROM was WFL (within functional limits)

## 2018-08-31 NOTE — SWALLOW BEDSIDE ASSESSMENT ADULT - COMMENTS
SLP follow up as per plan. As per discussion with Dr. Garcia, swallow evaluation deferred until tomorrow 9/1/18 due to pt undergoing bronchoscopy with lidocaine this AM, now in lethargic state. SLP to follow up.

## 2018-08-31 NOTE — PROGRESS NOTE ADULT - SUBJECTIVE AND OBJECTIVE BOX
83 F PMH  COPD, OP, cataracts, w cc of SOB  The pt developed SOB, REED, occasional nonproductive cough, fatigue, weight loss of 15 lbs in last 6 months. A CXR, followed by CT scan revealed multiple right lung nodules.    [id number]:  RVATS /   Right upper lobectomy       Issues:   Atelectasis  Shortness of breath / Bronchospasm / COPD exacerbation  SVT / a-FIB  Pneumothorax  Air leak              Postop pain  HTN    Home Medications:  Advair Diskus 250 mcg-50 mcg inhalation powder: 1 puff(s) inhaled 2 times a day (27 Aug 2018 12:42)  Spiriva 18 mcg inhalation capsule: 1 cap(s) inhaled once a day (27 Aug 2018 12:42)    PAST MEDICAL & SURGICAL HISTORY:  Lung nodules  COPD (Chronic Obstructive Pulmonary Disease): diagnose in ~2013  Colon Polyps  History of Osteoporosis  History of Cataract Surgery: left eye  Hip Replacement: bilateral      MEDICATIONS  (STANDING):  acetaminophen   Tablet. 650 milliGRAM(s) Oral every 6 hours  acetaminophen  IVPB. 1000 milliGRAM(s) IV Intermittent once  docusate sodium 100 milliGRAM(s) Oral three times a day  dornase selina Solution 2.5 milliGRAM(s) Inhalation daily  famotidine    Tablet 20 milliGRAM(s) Oral daily  fluconAZOLE IVPB      fluconAZOLE IVPB 200 milliGRAM(s) IV Intermittent every 24 hours  heparin  Injectable 5000 Unit(s) SubCutaneous every 8 hours  HYDROmorphone PCA (1 mG/mL) 30 milliLiter(s) PCA Continuous PCA Continuous  ipratropium    for Nebulization 500 MICROGram(s) Nebulizer every 6 hours  levalbuterol Inhalation 0.63 milliGRAM(s) Inhalation every 6 hours  magnesium sulfate  IVPB 2 Gram(s) IV Intermittent once  meropenem  IVPB 1000 milliGRAM(s) IV Intermittent every 8 hours  metoprolol tartrate 25 milliGRAM(s) Oral every 8 hours  sodium chloride 0.9% Bolus 250 milliLiter(s) IV Bolus once  sodium chloride 0.9%. 1000 milliLiter(s) (30 mL/Hr) IV Continuous <Continuous>  sodium chloride 3%  Inhalation 4 milliLiter(s) Inhalation every 6 hours  vancomycin  IVPB 1000 milliGRAM(s) IV Intermittent every 24 hours  vancomycin  IVPB        MEDICATIONS  (PRN):  HYDROmorphone PCA (1 mG/mL) Rescue Clinician Bolus 0.5 milliGRAM(s) IV Push every 15 minutes PRN for Pain Scale GREATER THAN 6  naloxone Injectable 0.1 milliGRAM(s) IV Push every 3 minutes PRN For ANY of the following changes in patient status:  A. RR LESS THAN 10 breaths per minute, B. Oxygen saturation LESS THAN 90%, C. Sedation score of 6  ondansetron Injectable 4 milliGRAM(s) IV Push every 6 hours PRN Nausea      ICU Vital Signs Last 24 Hrs  T(C): 36.7 (31 Aug 2018 04:00), Max: 36.8 (30 Aug 2018 20:00)  T(F): 98.1 (31 Aug 2018 04:00), Max: 98.3 (30 Aug 2018 20:00)  HR: 113 (31 Aug 2018 05:00) (83 - 113)  BP: 119/68 (31 Aug 2018 05:00) (107/92 - 185/107)  BP(mean): 81 (31 Aug 2018 05:00) (68 - 124)  RR: 26 (31 Aug 2018 05:00) (16 - 27)  SpO2: 97% (31 Aug 2018 05:00) (91% - 98%)      Physical exam:                                                   Gen:                  WD WN in NAD              Neuro:              Alert & Oriented X 3, no focal deficits                          Cardiovascular:   S1 & S2, regular                           Respiratory:        Good B/L Air entry is fair and decreased on both sides R>L, has bilateral conducted sounds / + rhonchi /rales                          GI:                      + bowel sounds, Soft, nondistended and nontender,                           Ext:                     No cyanosis or edema,       I&O's Summary    29 Aug 2018 07:01  -  30 Aug 2018 07:00  --------------------------------------------------------  IN: 1880 mL / OUT: 3150 mL / NET: -1270 mL    30 Aug 2018 07:01  -  31 Aug 2018 06:04  --------------------------------------------------------  IN: 1450 mL / OUT: 1680 mL / NET: -230 mL      Labs:                                                                           14.2   13.56 )-----------( 331      ( 31 Aug 2018 03:45 )             41.5                            08-31    133<L>  |  94<L>  |  17  ----------------------------<  146<H>  4.3   |  27  |  0.44<L>    Ca    8.7      31 Aug 2018 03:15  Phos  3.0     08-31  Mg     1.9     08-31 08/30  Rotated film, right lower consolidation vs atelectasis. Small right apical pneumo. - Official report pending  08/29:   There is a right sided chest tube as on the prior study.   There is increased lucency of the right upper hemithorax likely   representing a pneumothorax which may have increased in size since the   prior study performed earlier on the same day. There is a small left   pleural effusion with left lung base atelectasis. There is a small right   pleural effusion with more confluent consolidation at the right lung base   which may represent pneumonia unchanged.      Plan:                          Neuro:                                         Pain control                             Cardiovascular:                                          Continue hemodynamic monitoring.  SVT / A-fb: EPS evaluated and recommended Lopressor 25mg bid, increase as tolerated & PPM if experiences pauses again.   HTN: Increased lopressor to 25mg q8hrs                            Respiratory:                                           S/P FOB  Pt is on 2L  nasal canula,    AM CXR -  rt lower atelectasis – improved post FOB Continue aggressive chest PT / Pulmozyme / 3% saline inhalation / Xopenex and Atrovent nebs                                         mild SOB, not in any distress.                                         Using incentive spirometry 500cc                                         Monitor chest tube output                                         Pneumothorax is improved with + air leak.                Dropped lung when put on water seal, Chest tube back  to suction              Monitor clinically and repeat  CXR  later                                                               COPD: Continue  Xopenex / Atrovent nebulizer Rx.                           GI                                         NPO w TFs -on holdfor possible FOB                                         Continue GI prophylaxis with Protonix                                         Continue Zofran / Reglan for nausea - PRN	                                                                 Renal:                                         Continue LR 30cc/hr                                         Monitor I/Os and electrolytes                                                                                        Hem/ Onc:                                         SQH & SCDs for VTE prophylaxis                                         Monitor chest tube output &  signs of bleeding.                                          Follow CBCs                           Infectious disease:                                          No signs of infection. Monitor for fever / leukocytosis.                                          All surgical incision / chest tube sites look clean                            Endocrine                                           Continue Accu-Checks with coverage    All available pertinent clinical, laboratory, radiographic, hemodynamic, echocardiographic, respiratory data, microbiologic data and chart were reviewed and analyzed frequently. GI and DVT prophylaxis, glycemic control, head of bed elevation and skin care issues were addressed.  Patient seen, examined and plan discussed with CT Surgery / CTICU team during rounds.    I have spent 45  minutes of critical care time with this pt between  0001 am  and  8am     Napoleon Barron MD

## 2018-08-31 NOTE — PROGRESS NOTE ADULT - ASSESSMENT
83 y.o. female with hx of COPD, no significant cardiac history presenting with intraop- postop SVT s/p Right upper lobectomy.     1. Intraop/postop SVT  s/p Right upper lobectomy, found to be in SVT responsive to IV CCB   after starting ccb gtt patient was found to have intermittent 3 sec pauses on tele.   brief episodes of afib/flutter intermittently noted on tele  no further pauses noted on tele, remains NSR with frequent PACs   cont with bb, if develops longer pauses on bb, may need PPM  Echo with normal LV function   chadsvasc 3 - start on anticoagulation when cleared by CTS   EP f/u     2. Chest pain   resolved, likely referred pain from chest tube   Cardiac enzymes negative, no evidence of acute ischemia   Echo with normal LV function     3. HTN  bp stable  continue current medication regimen     4. S/p Right upper lobectomy   Care per CTICU     5. COPD  stable on inhalers     dvt ppx 83 y.o. female with hx of COPD, no significant cardiac history presenting with intraop- postop SVT s/p Right upper lobectomy.     1. Intraop/postop SVT  s/p Right upper lobectomy, found to be in SVT responsive to IV CCB   after starting ccb gtt patient was found to have intermittent 3 sec pauses on tele.   brief episodes of afib/flutter intermittently noted on tele  no further pauses noted on tele, remains NSR with frequent PACs   cont with bb, if develops longer pauses on bb, may need PPM  Echo with normal LV function   chadsvasc 3 - start on anticoagulation when cleared by CTS   EP f/u     2. Chest pain, atypical   likely referred pain from chest tube   no ecg changes, esther negative  Echo with normal LV function   pain mgt following     3. HTN  bp stable  continue current medication regimen     4. S/p Right upper lobectomy   Care per CTICU     5. COPD  stable on inhalers     dvt ppx

## 2018-08-31 NOTE — PROGRESS NOTE ADULT - SUBJECTIVE AND OBJECTIVE BOX
Infectious Diseases progress note:    Subjective:  Pt somewhat better today.  Sitting in chair.  Had repeat bronch today.  WBC remains elevated.  Bronch cultures GNR (+)      ROS:  CONSTITUTIONAL:  No fever, chills, rigors  CARDIOVASCULAR:  No chest pain or palpitations  RESPIRATORY:   No SOB, cough, dyspnea on exertion.  No wheezing  GASTROINTESTINAL:  No abd pain, N/V, diarrhea/constipation  EXTREMITIES:  No swelling or joint pain  GENITOURINARY:  No burning on urination, increased frequency or urgency.  No flank pain  NEUROLOGIC:  No HA, visual disturbances  SKIN: No rashes    Allergies    No Known Allergies    Intolerances        ANTIBIOTICS/RELEVANT:  antimicrobials  fluconAZOLE IVPB      fluconAZOLE IVPB 200 milliGRAM(s) IV Intermittent every 24 hours  meropenem  IVPB 1000 milliGRAM(s) IV Intermittent every 8 hours  vancomycin  IVPB 1000 milliGRAM(s) IV Intermittent every 24 hours  vancomycin  IVPB        immunologic:    OTHER:  acetaminophen   Tablet. 650 milliGRAM(s) Oral every 6 hours  acetaminophen  IVPB. 1000 milliGRAM(s) IV Intermittent once  docusate sodium 100 milliGRAM(s) Oral three times a day  dornase selina Solution 2.5 milliGRAM(s) Inhalation daily  famotidine    Tablet 20 milliGRAM(s) Oral daily  heparin  Injectable 5000 Unit(s) SubCutaneous every 8 hours  ipratropium    for Nebulization 500 MICROGram(s) Nebulizer every 6 hours  levalbuterol Inhalation 0.63 milliGRAM(s) Inhalation every 6 hours  metoprolol tartrate 25 milliGRAM(s) Oral every 8 hours  sodium chloride 0.9%. 1000 milliLiter(s) IV Continuous <Continuous>  sodium chloride 3%  Inhalation 4 milliLiter(s) Inhalation every 6 hours      Objective:  Vital Signs Last 24 Hrs  T(C): 36.8 (31 Aug 2018 20:00), Max: 37 (31 Aug 2018 08:00)  T(F): 98.3 (31 Aug 2018 20:00), Max: 98.6 (31 Aug 2018 08:00)  HR: 110 (31 Aug 2018 22:00) (81 - 113)  BP: 130/66 (31 Aug 2018 22:00) (100/57 - 149/68)  BP(mean): 80 (31 Aug 2018 22:00) (66 - 96)  RR: 19 (31 Aug 2018 22:00) (12 - 27)  SpO2: 97% (31 Aug 2018 22:00) (93% - 100%)    PHYSICAL EXAM:  Constitutional:NAD  Eyes:ELE, EOMI  Ear/Nose/Throat:  (+) thrush.  Moist mucous membranes	  Neck:no JVD, no lymphadenopathy, supple  Respiratory: CTA lanette, chest tube  Cardiovascular: S1S2 RRR, no murmurs  Gastrointestinal:soft, nontender,  nondistended (+) BS  Extremities:no e/e/c  Skin:  no rashes, open wounds or ulcerations        LABS:                        14.2   13.56 )-----------( 331      ( 31 Aug 2018 03:45 )             41.5     08-31    133<L>  |  94<L>  |  17  ----------------------------<  146<H>  4.3   |  27  |  0.44<L>    Ca    8.7      31 Aug 2018 03:15  Phos  3.0     08-31  Mg     1.9     08-31            MICROBIOLOGY:    Culture - Respiratory with Gram Stain (08.31.18 @ 13:46)    Gram Stain Sputum:   GPR^Gram Positive Rods  QUANTITY OF BACTERIA SEEN: RARE (1+)  WBC^White Blood Cells  QNTY CELLS IN GRAM STAIN: MANY (4+)    Specimen Source: BRONCHIAL LAVAGE    Culture - Body Fluid with Gram Stain (08.31.18 @ 12:39)    Gram Stain:   NOS^No Organisms Seen  WBC^White Blood Cells  QNTY CELLS IN GRAM STAIN: FEW (2+)    Specimen Source: PLEURAL FLUID    Culture - Respiratory with Gram Stain (08.30.18 @ 11:38)    Gram Stain Sputum:   GPCPR^Gram Pos Cocci in Pairs  QUANTITY OF BACTERIA SEEN: MANY (4+)  GPR^Gram Positive Rods  QUANTITY OF BACTERIA SEEN: MANY (4+)  GNR^Gram Neg Rods  QUANTITY OF BACTERIA SEEN: MANY (4+)  YEAST^YEAST.  QUANTITY OF BACTERIA SEEN: FEW (2+)  WBC^WhiteBlood Cells  QNTY CELLS IN GRAM STAIN: MANY (4+)    Culture - Respiratory:   GNRID^Gram Neg Jimi To Be Identified  QUANTITY OF GROWTH: MANY    Specimen Source: BRONCHIAL LAVAGE          RADIOLOGY & ADDITIONAL STUDIES:    < from: Xray Chest 1 View- PORTABLE-Urgent (08.31.18 @ 11:42) >  IMPRESSION:   Stable examination from prior. Unchanged right apical pneumothorax and   right lower lung partial atelectasis and adjacent pleural effusion.   Asymmetric right-sided edema is stable.    < end of copied text >

## 2018-08-31 NOTE — PROGRESS NOTE ADULT - SUBJECTIVE AND OBJECTIVE BOX
Anesthesia Pain Management Service- Attending Addendum    SUBJECTIVE: Patient's pain control adequate    Therapy:	  [ X] IV PCA	   [ ] Epidural           [ ] s/p Spinal Opoid              [ ] Postpartum infusion	  [ ] Patient controlled regional anesthesia (PCRA)    [ ] prn Analgesics    Allergies    No Known Allergies    Intolerances      MEDICATIONS  (STANDING):  acetaminophen   Tablet. 650 milliGRAM(s) Oral every 6 hours  acetaminophen  IVPB. 1000 milliGRAM(s) IV Intermittent once  docusate sodium 100 milliGRAM(s) Oral three times a day  dornase selina Solution 2.5 milliGRAM(s) Inhalation daily  famotidine    Tablet 20 milliGRAM(s) Oral daily  fluconAZOLE IVPB      fluconAZOLE IVPB 200 milliGRAM(s) IV Intermittent every 24 hours  heparin  Injectable 5000 Unit(s) SubCutaneous every 8 hours  ipratropium    for Nebulization 500 MICROGram(s) Nebulizer every 6 hours  levalbuterol Inhalation 0.63 milliGRAM(s) Inhalation every 6 hours  meropenem  IVPB 1000 milliGRAM(s) IV Intermittent every 8 hours  metoprolol tartrate 25 milliGRAM(s) Oral every 8 hours  sodium chloride 0.9%. 1000 milliLiter(s) (30 mL/Hr) IV Continuous <Continuous>  sodium chloride 3%  Inhalation 4 milliLiter(s) Inhalation every 6 hours  vancomycin  IVPB 1000 milliGRAM(s) IV Intermittent every 24 hours  vancomycin  IVPB        MEDICATIONS  (PRN):      OBJECTIVE:   [X] No new signs     [ ] Other:    Side Effects:  [X ] None			[ ] Other:      ASSESSMENT/PLAN  -Discontinue current therapy    [ ] Therapy changed to:    [ ] IV PCA       [ ] Epidural     [ X] prn Analgesics     Comments: Pain management per primary team, APS to sign off

## 2018-08-31 NOTE — PHYSICAL THERAPY INITIAL EVALUATION ADULT - PATIENT PROFILE REVIEW, REHAB EVAL
Pt. profile reviewed, consulted with RN Sloan GOODE prior to initial PT evaluation and tx, as per RN, Pt. is OK to participate in skilled therapy session, current activity orders; ambulate as tolerated./yes

## 2018-09-01 LAB
-  AMIKACIN: SIGNIFICANT CHANGE UP
-  AMPICILLIN/SULBACTAM: SIGNIFICANT CHANGE UP
-  AMPICILLIN: SIGNIFICANT CHANGE UP
-  AZTREONAM: SIGNIFICANT CHANGE UP
-  CEFAZOLIN: SIGNIFICANT CHANGE UP
-  CEFEPIME: SIGNIFICANT CHANGE UP
-  CEFOXITIN: SIGNIFICANT CHANGE UP
-  CEFTAZIDIME: SIGNIFICANT CHANGE UP
-  CEFTRIAXONE: SIGNIFICANT CHANGE UP
-  CIPROFLOXACIN: SIGNIFICANT CHANGE UP
-  ERTAPENEM: SIGNIFICANT CHANGE UP
-  GENTAMICIN: SIGNIFICANT CHANGE UP
-  IMIPENEM: SIGNIFICANT CHANGE UP
-  LEVOFLOXACIN: SIGNIFICANT CHANGE UP
-  MEROPENEM: SIGNIFICANT CHANGE UP
-  PIPERACILLIN/TAZOBACTAM: SIGNIFICANT CHANGE UP
-  TIGECYCLINE: SIGNIFICANT CHANGE UP
-  TOBRAMYCIN: SIGNIFICANT CHANGE UP
-  TRIMETHOPRIM/SULFAMETHOXAZOLE: SIGNIFICANT CHANGE UP
BACTERIA SPT RESP CULT: SIGNIFICANT CHANGE UP
BUN SERPL-MCNC: 16 MG/DL — SIGNIFICANT CHANGE UP (ref 7–23)
CALCIUM SERPL-MCNC: 8.7 MG/DL — SIGNIFICANT CHANGE UP (ref 8.4–10.5)
CHLORIDE SERPL-SCNC: 99 MMOL/L — SIGNIFICANT CHANGE UP (ref 98–107)
CO2 SERPL-SCNC: 24 MMOL/L — SIGNIFICANT CHANGE UP (ref 22–31)
CREAT SERPL-MCNC: 0.4 MG/DL — LOW (ref 0.5–1.3)
GLUCOSE SERPL-MCNC: 100 MG/DL — HIGH (ref 70–99)
GRAM STN SPT: SIGNIFICANT CHANGE UP
HCT VFR BLD CALC: 38.7 % — SIGNIFICANT CHANGE UP (ref 34.5–45)
HGB BLD-MCNC: 13.1 G/DL — SIGNIFICANT CHANGE UP (ref 11.5–15.5)
MAGNESIUM SERPL-MCNC: 1.9 MG/DL — SIGNIFICANT CHANGE UP (ref 1.6–2.6)
MCHC RBC-ENTMCNC: 31 PG — SIGNIFICANT CHANGE UP (ref 27–34)
MCHC RBC-ENTMCNC: 33.9 % — SIGNIFICANT CHANGE UP (ref 32–36)
MCV RBC AUTO: 91.5 FL — SIGNIFICANT CHANGE UP (ref 80–100)
METHOD TYPE: SIGNIFICANT CHANGE UP
NRBC # FLD: 0 — SIGNIFICANT CHANGE UP
ORGANISM # SPEC MICROSCOPIC CNT: SIGNIFICANT CHANGE UP
ORGANISM # SPEC MICROSCOPIC CNT: SIGNIFICANT CHANGE UP
PHOSPHATE SERPL-MCNC: 2.2 MG/DL — LOW (ref 2.5–4.5)
PLATELET # BLD AUTO: 309 K/UL — SIGNIFICANT CHANGE UP (ref 150–400)
PMV BLD: 9.9 FL — SIGNIFICANT CHANGE UP (ref 7–13)
POTASSIUM SERPL-MCNC: 4.1 MMOL/L — SIGNIFICANT CHANGE UP (ref 3.5–5.3)
POTASSIUM SERPL-SCNC: 4.1 MMOL/L — SIGNIFICANT CHANGE UP (ref 3.5–5.3)
RBC # BLD: 4.23 M/UL — SIGNIFICANT CHANGE UP (ref 3.8–5.2)
RBC # FLD: 13.1 % — SIGNIFICANT CHANGE UP (ref 10.3–14.5)
SODIUM SERPL-SCNC: 135 MMOL/L — SIGNIFICANT CHANGE UP (ref 135–145)
WBC # BLD: 12.08 K/UL — HIGH (ref 3.8–10.5)
WBC # FLD AUTO: 12.08 K/UL — HIGH (ref 3.8–10.5)

## 2018-09-01 PROCEDURE — 31624 DX BRONCHOSCOPE/LAVAGE: CPT

## 2018-09-01 PROCEDURE — 71045 X-RAY EXAM CHEST 1 VIEW: CPT | Mod: 26

## 2018-09-01 PROCEDURE — 99233 SBSQ HOSP IP/OBS HIGH 50: CPT

## 2018-09-01 PROCEDURE — 99232 SBSQ HOSP IP/OBS MODERATE 35: CPT

## 2018-09-01 RX ORDER — ACETAMINOPHEN 500 MG
750 TABLET ORAL ONCE
Qty: 0 | Refills: 0 | Status: COMPLETED | OUTPATIENT
Start: 2018-09-01 | End: 2018-09-03

## 2018-09-01 RX ORDER — LANOLIN ALCOHOL/MO/W.PET/CERES
3 CREAM (GRAM) TOPICAL AT BEDTIME
Qty: 0 | Refills: 0 | Status: DISCONTINUED | OUTPATIENT
Start: 2018-09-01 | End: 2018-09-09

## 2018-09-01 RX ORDER — DOCUSATE SODIUM 100 MG
100 CAPSULE ORAL THREE TIMES A DAY
Qty: 0 | Refills: 0 | Status: DISCONTINUED | OUTPATIENT
Start: 2018-09-01 | End: 2018-09-09

## 2018-09-01 RX ORDER — POTASSIUM PHOSPHATE, MONOBASIC POTASSIUM PHOSPHATE, DIBASIC 236; 224 MG/ML; MG/ML
15 INJECTION, SOLUTION INTRAVENOUS ONCE
Qty: 0 | Refills: 0 | Status: COMPLETED | OUTPATIENT
Start: 2018-09-01 | End: 2018-09-01

## 2018-09-01 RX ORDER — MAGNESIUM SULFATE 500 MG/ML
2 VIAL (ML) INJECTION ONCE
Qty: 0 | Refills: 0 | Status: COMPLETED | OUTPATIENT
Start: 2018-09-01 | End: 2018-09-01

## 2018-09-01 RX ADMIN — FAMOTIDINE 20 MILLIGRAM(S): 10 INJECTION INTRAVENOUS at 14:31

## 2018-09-01 RX ADMIN — Medication 100 MILLIGRAM(S): at 14:31

## 2018-09-01 RX ADMIN — LEVALBUTEROL 0.63 MILLIGRAM(S): 1.25 SOLUTION, CONCENTRATE RESPIRATORY (INHALATION) at 22:12

## 2018-09-01 RX ADMIN — LEVALBUTEROL 0.63 MILLIGRAM(S): 1.25 SOLUTION, CONCENTRATE RESPIRATORY (INHALATION) at 16:11

## 2018-09-01 RX ADMIN — Medication 500 MICROGRAM(S): at 22:12

## 2018-09-01 RX ADMIN — MEROPENEM 100 MILLIGRAM(S): 1 INJECTION INTRAVENOUS at 05:11

## 2018-09-01 RX ADMIN — SODIUM CHLORIDE 4 MILLILITER(S): 9 INJECTION INTRAMUSCULAR; INTRAVENOUS; SUBCUTANEOUS at 22:12

## 2018-09-01 RX ADMIN — SODIUM CHLORIDE 4 MILLILITER(S): 9 INJECTION INTRAMUSCULAR; INTRAVENOUS; SUBCUTANEOUS at 03:45

## 2018-09-01 RX ADMIN — Medication 250 MILLIGRAM(S): at 14:31

## 2018-09-01 RX ADMIN — FLUCONAZOLE 100 MILLIGRAM(S): 150 TABLET ORAL at 12:50

## 2018-09-01 RX ADMIN — LEVALBUTEROL 0.63 MILLIGRAM(S): 1.25 SOLUTION, CONCENTRATE RESPIRATORY (INHALATION) at 03:32

## 2018-09-01 RX ADMIN — Medication 100 MILLIGRAM(S): at 22:19

## 2018-09-01 RX ADMIN — MEROPENEM 100 MILLIGRAM(S): 1 INJECTION INTRAVENOUS at 14:32

## 2018-09-01 RX ADMIN — Medication 50 GRAM(S): at 06:15

## 2018-09-01 RX ADMIN — MEROPENEM 100 MILLIGRAM(S): 1 INJECTION INTRAVENOUS at 22:20

## 2018-09-01 RX ADMIN — Medication 500 MICROGRAM(S): at 03:32

## 2018-09-01 RX ADMIN — POTASSIUM PHOSPHATE, MONOBASIC POTASSIUM PHOSPHATE, DIBASIC 62.5 MILLIMOLE(S): 236; 224 INJECTION, SOLUTION INTRAVENOUS at 08:29

## 2018-09-01 RX ADMIN — HEPARIN SODIUM 5000 UNIT(S): 5000 INJECTION INTRAVENOUS; SUBCUTANEOUS at 14:34

## 2018-09-01 RX ADMIN — HEPARIN SODIUM 5000 UNIT(S): 5000 INJECTION INTRAVENOUS; SUBCUTANEOUS at 22:19

## 2018-09-01 RX ADMIN — Medication 650 MILLIGRAM(S): at 05:12

## 2018-09-01 RX ADMIN — Medication 650 MILLIGRAM(S): at 05:30

## 2018-09-01 RX ADMIN — Medication 25 MILLIGRAM(S): at 14:34

## 2018-09-01 RX ADMIN — Medication 650 MILLIGRAM(S): at 00:10

## 2018-09-01 RX ADMIN — SODIUM CHLORIDE 30 MILLILITER(S): 9 INJECTION INTRAMUSCULAR; INTRAVENOUS; SUBCUTANEOUS at 19:39

## 2018-09-01 RX ADMIN — Medication 25 MILLIGRAM(S): at 05:12

## 2018-09-01 RX ADMIN — SODIUM CHLORIDE 4 MILLILITER(S): 9 INJECTION INTRAMUSCULAR; INTRAVENOUS; SUBCUTANEOUS at 16:12

## 2018-09-01 RX ADMIN — HEPARIN SODIUM 5000 UNIT(S): 5000 INJECTION INTRAVENOUS; SUBCUTANEOUS at 05:12

## 2018-09-01 RX ADMIN — Medication 500 MICROGRAM(S): at 16:11

## 2018-09-01 RX ADMIN — Medication 25 MILLIGRAM(S): at 22:19

## 2018-09-01 RX ADMIN — Medication 650 MILLIGRAM(S): at 00:25

## 2018-09-01 NOTE — PROGRESS NOTE ADULT - SUBJECTIVE AND OBJECTIVE BOX
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 1000 milliGRAM(s) IV Intermittent once  docusate sodium Liquid 100 milliGRAM(s) Oral three times a day  dornase selina Solution 2.5 milliGRAM(s) Inhalation daily  famotidine    Tablet 20 milliGRAM(s) Oral daily  fluconAZOLE IVPB      fluconAZOLE IVPB 200 milliGRAM(s) IV Intermittent every 24 hours  heparin  Injectable 5000 Unit(s) SubCutaneous every 8 hours  ipratropium    for Nebulization 500 MICROGram(s) Nebulizer every 6 hours  levalbuterol Inhalation 0.63 milliGRAM(s) Inhalation every 6 hours  meropenem  IVPB 1000 milliGRAM(s) IV Intermittent every 8 hours  metoprolol tartrate 25 milliGRAM(s) Oral every 8 hours  potassium phosphate IVPB 15 milliMole(s) IV Intermittent once  sodium chloride 0.9%. 1000 milliLiter(s) (30 mL/Hr) IV Continuous <Continuous>  sodium chloride 3%  Inhalation 4 milliLiter(s) Inhalation every 6 hours  vancomycin  IVPB 1000 milliGRAM(s) IV Intermittent every 24 hours  vancomycin  IVPB        MEDICATIONS  (PRN):      EXAM:  Vital Signs Last 24 Hrs  T(C): 36.9 (01 Sep 2018 04:00), Max: 37 (31 Aug 2018 08:00)  T(F): 98.5 (01 Sep 2018 04:00), Max: 98.6 (31 Aug 2018 08:00)  HR: 86 (01 Sep 2018 06:00) (81 - 111)  BP: 141/64 (01 Sep 2018 05:00) (100/57 - 150/62)  BP(mean): 82 (01 Sep 2018 05:00) (66 - 95)  RR: 19 (01 Sep 2018 06:00) (12 - 26)  SpO2: 99% (01 Sep 2018 06:00) (94% - 100%)    GENERAL: No acute distress  NEURO: Alert and oriented x 3  LUNGS:b/l Rhonchi  CV: S1/S2, no murmur  ABD non tender BS pos   No edema   awake                          13.1   12.08 )-----------( 309      ( 01 Sep 2018 04:00 )             38.7   09-01    135  |  99  |  16  ----------------------------<  100<H>  4.1   |  24  |  0.40<L>    Ca    8.7      01 Sep 2018 04:00  Phos  2.2     09-01  Mg     1.9     09-01  < from: Xray Chest 1 View- PORTABLE-Urgent (08.31.18 @ 11:42) >  INTERPRETATION:  EXAMINATION: XR CHEST PORTABLE URGENT 1V    CLINICAL INDICATION: r/o i/e    TECHNIQUE: Single portable view of the chest was obtained.    COMPARISON: APview the chest acquired 8/31/2018 at 6:02 AM.    FINDINGS:     The cardia mediastinal silhouette is not well evaluated in this   projection.  Lines and tubes in unchanged position from prior examination.  Tiny right apical pneumothorax, unchanged.  Right lower lobe subsegmental atelectasis with adjacent effusion.  Right-sided pulmonary edema, unchanged.    IMPRESSION:   Stable examination from prior. Unchanged right apical pneumothorax and   right lower lung partial atelectasis and adjacent pleural effusion.   Asymmetric right-sided edema is stable.    < end of copied text >        PROBLEM LIST:  83yFemale with HEALTH ISSUES - PROBLEM Dx:  Adeno Ca Lung   complicated PTX     Atelectatics with mucous plugging          RECS:  Pulmonary Toilet   Continued Antibx as per noted orders   Bronch to clear secretions   Neb tx with Dornase   -  -    Thank you for this consultation, please feel free to call with any questions [phone]  [facility] DO [facility]

## 2018-09-01 NOTE — PROGRESS NOTE ADULT - SUBJECTIVE AND OBJECTIVE BOX
CARDIOLOGY FOLLOW UP - Dr. Arevalo    CC no chest pain, mild SOB       PHYSICAL EXAM:  T(C): 36.7 (09-01-18 @ 08:00), Max: 37 (09-01-18 @ 00:00)  HR: 108 (09-01-18 @ 11:00) (81 - 110)  BP: 125/52 (09-01-18 @ 11:00) (100/57 - 150/62)  RR: 28 (09-01-18 @ 11:00) (12 - 28)  SpO2: 92% (09-01-18 @ 11:00) (92% - 100%)  Wt(kg): --  I&O's Summary    31 Aug 2018 07:01  -  01 Sep 2018 07:00  --------------------------------------------------------  IN: 930 mL / OUT: 1555 mL / NET: -625 mL    01 Sep 2018 07:01  -  01 Sep 2018 12:05  --------------------------------------------------------  IN: 340 mL / OUT: 130 mL / NET: 210 mL        Appearance: Normal	  Cardiovascular: Normal S1 S2,RRR, No JVD, No murmurs  Respiratory: rhonchi + chest tube   Gastrointestinal:  Soft, Non-tender, + BS	  Extremities: Normal range of motion, No clubbing, cyanosis or edema        MEDICATIONS  (STANDING):  acetaminophen  IVPB. 1000 milliGRAM(s) IV Intermittent once  docusate sodium Liquid 100 milliGRAM(s) Oral three times a day  dornase selina Solution 2.5 milliGRAM(s) Inhalation daily  famotidine    Tablet 20 milliGRAM(s) Oral daily  fluconAZOLE IVPB      fluconAZOLE IVPB 200 milliGRAM(s) IV Intermittent every 24 hours  heparin  Injectable 5000 Unit(s) SubCutaneous every 8 hours  ipratropium    for Nebulization 500 MICROGram(s) Nebulizer every 6 hours  levalbuterol Inhalation 0.63 milliGRAM(s) Inhalation every 6 hours  meropenem  IVPB 1000 milliGRAM(s) IV Intermittent every 8 hours  metoprolol tartrate 25 milliGRAM(s) Oral every 8 hours  sodium chloride 0.9%. 1000 milliLiter(s) (30 mL/Hr) IV Continuous <Continuous>  sodium chloride 3%  Inhalation 4 milliLiter(s) Inhalation every 6 hours  vancomycin  IVPB 1000 milliGRAM(s) IV Intermittent every 24 hours  vancomycin  IVPB          TELEMETRY: nSR-  sinus tachycardia/ PAC  	    ECG:  	  RADIOLOGY:   DIAGNOSTIC TESTING:  [ ] Echocardiogram:  [ ]  Catheterization:  [ ] Stress Test:    OTHER: 	    LABS:	 	                                13.1   12.08 )-----------( 309      ( 01 Sep 2018 04:00 )             38.7     09-01    135  |  99  |  16  ----------------------------<  100<H>  4.1   |  24  |  0.40<L>    Ca    8.7      01 Sep 2018 04:00  Phos  2.2     09-01  Mg     1.9     09-01

## 2018-09-01 NOTE — PROCEDURE NOTE - PROCEDURE
<<-----Click on this checkbox to enter Procedure Flexible bronchoscopy by surgeon at bedside  09/01/2018    Active  [id number]

## 2018-09-01 NOTE — PROGRESS NOTE ADULT - SUBJECTIVE AND OBJECTIVE BOX
Infectious Diseases progress note:    Subjective:  NAD, awake.  No new fevers.  Bronch cultures growing Serratia.  Thrush improved.  NGT in place     ROS:  CONSTITUTIONAL:  No fever, chills, rigors  CARDIOVASCULAR:  No chest pain or palpitations  RESPIRATORY:   No SOB, cough, dyspnea on exertion.  No wheezing  GASTROINTESTINAL:  No abd pain, N/V, diarrhea/constipation  EXTREMITIES:  No swelling or joint pain  GENITOURINARY:  No burning on urination, increased frequency or urgency.  No flank pain  NEUROLOGIC:  No HA, visual disturbances  SKIN: No rashes    Allergies    No Known Allergies    Intolerances        ANTIBIOTICS/RELEVANT:  antimicrobials  fluconAZOLE IVPB      fluconAZOLE IVPB 200 milliGRAM(s) IV Intermittent every 24 hours  meropenem  IVPB 1000 milliGRAM(s) IV Intermittent every 8 hours  vancomycin  IVPB 1000 milliGRAM(s) IV Intermittent every 24 hours  vancomycin  IVPB        immunologic:    OTHER:  acetaminophen  IVPB. 1000 milliGRAM(s) IV Intermittent once  docusate sodium Liquid 100 milliGRAM(s) Oral three times a day  dornase selina Solution 2.5 milliGRAM(s) Inhalation daily  famotidine    Tablet 20 milliGRAM(s) Oral daily  heparin  Injectable 5000 Unit(s) SubCutaneous every 8 hours  ipratropium    for Nebulization 500 MICROGram(s) Nebulizer every 6 hours  levalbuterol Inhalation 0.63 milliGRAM(s) Inhalation every 6 hours  metoprolol tartrate 25 milliGRAM(s) Oral every 8 hours  sodium chloride 0.9%. 1000 milliLiter(s) IV Continuous <Continuous>  sodium chloride 3%  Inhalation 4 milliLiter(s) Inhalation every 6 hours      Objective:  Vital Signs Last 24 Hrs  T(C): 36.8 (01 Sep 2018 12:00), Max: 37 (01 Sep 2018 00:00)  T(F): 98.3 (01 Sep 2018 12:00), Max: 98.6 (01 Sep 2018 00:00)  HR: 94 (01 Sep 2018 17:00) (84 - 110)  BP: 128/62 (01 Sep 2018 17:00) (123/57 - 150/62)  BP(mean): 79 (01 Sep 2018 17:00) (70 - 89)  RR: 27 (01 Sep 2018 17:00) (17 - 29)  SpO2: 95% (01 Sep 2018 17:00) (91% - 100%)    PHYSICAL EXAM:  Constitutional: NG tube, appears weak  Eyes:ELE, EOMI  Ear/Nose/Throat: no thrush, mucositis.  Moist mucous membranes	  Neck:no JVD, no lymphadenopathy, supple  Respiratory: CTA lanette, chest tube  Cardiovascular: S1S2 RRR, no murmurs  Gastrointestinal:soft, nontender,  nondistended (+) BS  Extremities:no e/e/c  Skin:  no rashes, open wounds or ulcerations        LABS:                        13.1   12.08 )-----------( 309      ( 01 Sep 2018 04:00 )             38.7     09-01    135  |  99  |  16  ----------------------------<  100<H>  4.1   |  24  |  0.40<L>    Ca    8.7      01 Sep 2018 04:00  Phos  2.2     09-01  Mg     1.9     09-01              MICROBIOLOGY:    Culture - Respiratory with Gram Stain (08.31.18 @ 13:46)    Culture - Respiratory:   Normal Respiratory Mallory Absent  GNRID^Gram Neg Jimi To Be Identified  QUANTITY OF GROWTH: MODERATE    Gram Stain Sputum:   GPR^Gram Positive Rods  QUANTITY OF BACTERIA SEEN: RARE (1+)  WBC^White Blood Cells  QNTY CELLS IN GRAM STAIN: MANY (4+)    Specimen Source: BRONCHIAL LAVAGE      Culture - Body Fluid with Gram Stain (08.31.18 @ 12:39)    Gram Stain:   NOS^No Organisms Seen  WBC^White Blood Cells  QNTY CELLS IN GRAM STAIN: FEW (2+)    Culture - Body Fluid:   NO GROWTH - PRELIMINARY RESULTS    Specimen Source: PLEURAL FLUID    Culture - Respiratory with Gram Stain (08.30.18 @ 11:38)    Culture - Respiratory:   QUANTITY OF GROWTH: MANY    -  Gentamicin: S <=1 JOANNE    -  Imipenem: S <=1 JOANNE    -  Levofloxacin: S <=1 JOANNE    -  Meropenem: S <=1 JOANNE    -  Piperacillin/Tazobactam: R >64 JOANNE    -  Tigecycline: S <=1 JOANNE    -  Tobramycin: S 4 JOANNE    -  Trimethoprim/Sulfamethoxazole: S <=0.5/9.5 JOANNE    Gram Stain Sputum:   GPCPR Gram Pos Cocci in Pairs  QUANTITY OF BACTERIA SEEN: MANY (4+)  GPR^Gram Positive Rods  QUANTITY OF BACTERIA SEEN: MANY (4+)  GNR^Gram Neg Rods  QUANTITY OF BACTERIA SEEN: MANY (4+)  YEAST^YEAST.  QUANTITY OF BACTERIA SEEN: FEW (2+)  WBC^WhiteBlood Cells  QNTY CELLS IN GRAM STAIN: MANY (4+)    -  Amikacin: S <=8 JOANNE    -  Ampicillin: R >16 JOANNE    -  Ampicillin/Sulbactam: R >16/8 JOANNE    -  Aztreonam: S 8 JOANNE    -  Cefazolin: R >16 JOANNE    -  Cefepime: S <=2 JOANNE    -  Cefoxitin: R >16 JOANNE    -  Ceftazidime: R >16 JOANNE    -  Ceftriaxone: S <=1 JOANNE    -  Ciprofloxacin: S <=0.5 JOANNE    -  Ertapenem: S <=0.5 JOANNE    Specimen Source: BRONCHIAL LAVAGE    Organism Identification: Serratia marcescens    Organism: Serratia marcescens    Method Type: NEGATIVE JOANNE 43        RADIOLOGY & ADDITIONAL STUDIES:    < from: Xray Chest 1 View- PORTABLE-Routine (09.01.18 @ 08:37) >  FINDINGS:    LINES/TUBES: Unchanged feeding tube coursing below the diaphragm with tip   outside the field of view of this radiograph. Unchanged right chest tube.  LUNGS/PLEURA: Unchanged small bilateral pleural effusions with patchy   consolidation in both lower lungs. No definite pneumothorax.  MEDIASTINUM: Heart size cannot adequately be assessed on this projection.  OTHER: None.    IMPRESSION:     Unchanged bilateral pleural effusions and consolidation in both lower   lobes.    < end of copied text >

## 2018-09-01 NOTE — SWALLOW BEDSIDE ASSESSMENT ADULT - COMMENTS
Patient is an 83 year old female with PMHx of COPD, OP, cataracts, who presented with c/o SOB, REED, occasional non-productive cough, fatigue, weight loss of 15 lbs in last 6 months. A CXR, followed by CT scan revealed multiple right lung nodules. Patient is s/p RVATS / Right upper lobectomy complicated by SVT and Afib. Hospitalization further complicated by atelectasis, SOB, bronchospasm, COPD exacerbation, bronchospasm, COPD exacerbation, s/p bronchoscopy 8/30 and 8/31.    SLP follow up as per plan. Patient was received awake and sitting upright in chair at bedside. Overall deconditioning noted. Supplemental O2 via NC in place at 5L. Occasional weak cough with intermittent vocal wetness observed at baseline. Recommendations discussed with primary RN and Dr. Moya. Patient is an 83 year old female with PMHx of COPD, OP, cataracts, who presented with c/o SOB, REED, occasional non-productive cough, fatigue, weight loss of 15 lbs in last 6 months. A CXR, followed by CT scan revealed multiple right lung nodules. Patient is s/p RVATS / Right upper lobectomy complicated by SVT and Afib. Hospitalization further complicated by atelectasis, SOB, bronchospasm, COPD exacerbation, bronchospasm, COPD exacerbation, s/p bronchoscopy 8/30 and 8/31.    SLP follow up for bedside swallow evaluation as per plan. Patient was received awake and sitting upright in chair at bedside. Overall deconditioned status noted. Supplemental O2 via NC at 5L in place with increased work of breathing on exertion. Occasional unproductive cough with intermittent vocal wetness observed at baseline. Recommendations discussed with primary RN and Dr. Moya.

## 2018-09-01 NOTE — PROGRESS NOTE ADULT - ASSESSMENT
83 y.o. female with hx of COPD, no significant cardiac history presenting with intraop- postop SVT s/p Right upper lobectomy.     1. Intraop/postop SVT  s/p Right upper lobectomy, found to be in SVT responsive to IV CCB   after starting ccb gtt patient was found to have intermittent 3 sec pauses on tele.   no further pauses noted on tele, remains NSR with frequent PACs   Echo with normal LV function     2. Chest pain, atypical   likely referred pain from chest tube   no ecg changes, esther negative  Echo with normal LV function   pain mgt following     3. HTN  bp stable  continue current medication regimen     4. S/p Right upper lobectomy   Care per CTICU     5. COPD  stable on inhalers     6. Pafib  no further events noted   continue BB   chadsvasc 3 - start on anticoagulation when cleared by CTS     dvt ppx

## 2018-09-01 NOTE — PROGRESS NOTE ADULT - ASSESSMENT
83 y.o. female with hx of COPD, no significant cardiac history presenting with intraop- postop SVT s/p Right upper lobectomy.  Pt has been c/o fatigue, weight loss of 15 lbs in last 6 months, and reports occasional nonproductive cough.  CXR and subsequent CT scan, reports multiple right lung nodules.  Pt admitted  for lung resection.  Pt underwent lobectomy of right lung  with video-assisted thoracoscopic surgery  on 8/27/2018 and flexible bronchoscopy with bronchopulmonary lavage.  She has a right chest tube in place.      Post op course complicated by SVT and A.fib with pauses and pneumothorax.         Pt currently without fever.  WBC 14.1 on 8/28 --> 10.5.  Pt was given a dose of rocephin on 8/30 for possible pneumonia.  During bedside bronchoscopy pt noted to have copious murky secretions concerning for infection and oropharyngeal candidiases.  ID consult called for further antibiotic managment.     Problem/Plan - 1:    ·	Pna    - Pt s/p RUL lobectomy, s/p bronch/lavage, chest tube to suction.  Noted to have copious purulent secretions and oropharyngeal candidiasis.      - Recommend broad coverage, cont vanco/meropenem and diflucan.  Complete 2 weeks of diflucan to cover for possible esophageal candidiasis (through 9/12)    -  f/u vanco trough prior to 3rd dose.    - Respiratory cultures sent from 8/30 and 8/31 - thus far growing Serratia marcesans.   If patient spikes fever, send blood cultures x 2.    - Serial chest xrays.    - Chest tube management as per CTS    * Cont present antibiotics. If no MRSA, can d/c vancomycin    Will follow,    Margareth Hodge  [phone]    d/w CT icu team

## 2018-09-02 ENCOUNTER — TRANSCRIPTION ENCOUNTER (OUTPATIENT)
Age: 83
End: 2018-09-02

## 2018-09-02 LAB
-  AMIKACIN: SIGNIFICANT CHANGE UP
-  AMPICILLIN/SULBACTAM: SIGNIFICANT CHANGE UP
-  AMPICILLIN: SIGNIFICANT CHANGE UP
-  AZTREONAM: SIGNIFICANT CHANGE UP
-  CEFAZOLIN: SIGNIFICANT CHANGE UP
-  CEFEPIME: SIGNIFICANT CHANGE UP
-  CEFOXITIN: SIGNIFICANT CHANGE UP
-  CEFTAZIDIME: SIGNIFICANT CHANGE UP
-  CEFTRIAXONE: SIGNIFICANT CHANGE UP
-  CIPROFLOXACIN: SIGNIFICANT CHANGE UP
-  ERTAPENEM: SIGNIFICANT CHANGE UP
-  GENTAMICIN: SIGNIFICANT CHANGE UP
-  IMIPENEM: SIGNIFICANT CHANGE UP
-  LEVOFLOXACIN: SIGNIFICANT CHANGE UP
-  MEROPENEM: SIGNIFICANT CHANGE UP
-  PIPERACILLIN/TAZOBACTAM: SIGNIFICANT CHANGE UP
-  TIGECYCLINE: SIGNIFICANT CHANGE UP
-  TOBRAMYCIN: SIGNIFICANT CHANGE UP
-  TRIMETHOPRIM/SULFAMETHOXAZOLE: SIGNIFICANT CHANGE UP
BACTERIA SPT RESP CULT: SIGNIFICANT CHANGE UP
BUN SERPL-MCNC: 17 MG/DL — SIGNIFICANT CHANGE UP (ref 7–23)
CA-I BLD-SCNC: 1.03 MMOL/L — SIGNIFICANT CHANGE UP (ref 1.03–1.23)
CALCIUM SERPL-MCNC: 8.8 MG/DL — SIGNIFICANT CHANGE UP (ref 8.4–10.5)
CHLORIDE SERPL-SCNC: 98 MMOL/L — SIGNIFICANT CHANGE UP (ref 98–107)
CO2 SERPL-SCNC: 23 MMOL/L — SIGNIFICANT CHANGE UP (ref 22–31)
CREAT SERPL-MCNC: 0.4 MG/DL — LOW (ref 0.5–1.3)
GLUCOSE SERPL-MCNC: 104 MG/DL — HIGH (ref 70–99)
GRAM STN SPT: SIGNIFICANT CHANGE UP
HCT VFR BLD CALC: 37 % — SIGNIFICANT CHANGE UP (ref 34.5–45)
HGB BLD-MCNC: 12 G/DL — SIGNIFICANT CHANGE UP (ref 11.5–15.5)
MAGNESIUM SERPL-MCNC: 1.9 MG/DL — SIGNIFICANT CHANGE UP (ref 1.6–2.6)
MCHC RBC-ENTMCNC: 29.5 PG — SIGNIFICANT CHANGE UP (ref 27–34)
MCHC RBC-ENTMCNC: 32.4 % — SIGNIFICANT CHANGE UP (ref 32–36)
MCV RBC AUTO: 90.9 FL — SIGNIFICANT CHANGE UP (ref 80–100)
METHOD TYPE: SIGNIFICANT CHANGE UP
NRBC # FLD: 0 — SIGNIFICANT CHANGE UP
ORGANISM # SPEC MICROSCOPIC CNT: SIGNIFICANT CHANGE UP
ORGANISM # SPEC MICROSCOPIC CNT: SIGNIFICANT CHANGE UP
PHOSPHATE SERPL-MCNC: 2.2 MG/DL — LOW (ref 2.5–4.5)
PLATELET # BLD AUTO: 347 K/UL — SIGNIFICANT CHANGE UP (ref 150–400)
PMV BLD: 10 FL — SIGNIFICANT CHANGE UP (ref 7–13)
POTASSIUM SERPL-MCNC: 4.2 MMOL/L — SIGNIFICANT CHANGE UP (ref 3.5–5.3)
POTASSIUM SERPL-SCNC: 4.2 MMOL/L — SIGNIFICANT CHANGE UP (ref 3.5–5.3)
RBC # BLD: 4.07 M/UL — SIGNIFICANT CHANGE UP (ref 3.8–5.2)
RBC # FLD: 13.2 % — SIGNIFICANT CHANGE UP (ref 10.3–14.5)
SODIUM SERPL-SCNC: 137 MMOL/L — SIGNIFICANT CHANGE UP (ref 135–145)
VANCOMYCIN TROUGH SERPL-MCNC: 3.5 UG/ML — LOW (ref 10–20)
WBC # BLD: 17.27 K/UL — HIGH (ref 3.8–10.5)
WBC # FLD AUTO: 17.27 K/UL — HIGH (ref 3.8–10.5)

## 2018-09-02 PROCEDURE — 99291 CRITICAL CARE FIRST HOUR: CPT

## 2018-09-02 PROCEDURE — 71045 X-RAY EXAM CHEST 1 VIEW: CPT | Mod: 26

## 2018-09-02 RX ORDER — SODIUM CHLORIDE 9 MG/ML
250 INJECTION INTRAMUSCULAR; INTRAVENOUS; SUBCUTANEOUS ONCE
Qty: 0 | Refills: 0 | Status: COMPLETED | OUTPATIENT
Start: 2018-09-02 | End: 2018-09-02

## 2018-09-02 RX ORDER — POTASSIUM PHOSPHATE, MONOBASIC POTASSIUM PHOSPHATE, DIBASIC 236; 224 MG/ML; MG/ML
15 INJECTION, SOLUTION INTRAVENOUS ONCE
Qty: 0 | Refills: 0 | Status: COMPLETED | OUTPATIENT
Start: 2018-09-02 | End: 2018-09-02

## 2018-09-02 RX ORDER — FUROSEMIDE 40 MG
10 TABLET ORAL ONCE
Qty: 0 | Refills: 0 | Status: COMPLETED | OUTPATIENT
Start: 2018-09-02 | End: 2018-09-02

## 2018-09-02 RX ADMIN — Medication 500 MICROGRAM(S): at 15:49

## 2018-09-02 RX ADMIN — MEROPENEM 100 MILLIGRAM(S): 1 INJECTION INTRAVENOUS at 14:03

## 2018-09-02 RX ADMIN — Medication 3 MILLIGRAM(S): at 00:01

## 2018-09-02 RX ADMIN — MEROPENEM 100 MILLIGRAM(S): 1 INJECTION INTRAVENOUS at 21:06

## 2018-09-02 RX ADMIN — Medication 10 MILLIGRAM(S): at 15:00

## 2018-09-02 RX ADMIN — LEVALBUTEROL 0.63 MILLIGRAM(S): 1.25 SOLUTION, CONCENTRATE RESPIRATORY (INHALATION) at 22:13

## 2018-09-02 RX ADMIN — HEPARIN SODIUM 5000 UNIT(S): 5000 INJECTION INTRAVENOUS; SUBCUTANEOUS at 14:03

## 2018-09-02 RX ADMIN — Medication 500 MICROGRAM(S): at 22:13

## 2018-09-02 RX ADMIN — Medication 500 MICROGRAM(S): at 03:39

## 2018-09-02 RX ADMIN — LEVALBUTEROL 0.63 MILLIGRAM(S): 1.25 SOLUTION, CONCENTRATE RESPIRATORY (INHALATION) at 03:39

## 2018-09-02 RX ADMIN — Medication 250 MILLIGRAM(S): at 15:00

## 2018-09-02 RX ADMIN — FLUCONAZOLE 100 MILLIGRAM(S): 150 TABLET ORAL at 12:08

## 2018-09-02 RX ADMIN — LEVALBUTEROL 0.63 MILLIGRAM(S): 1.25 SOLUTION, CONCENTRATE RESPIRATORY (INHALATION) at 11:30

## 2018-09-02 RX ADMIN — SODIUM CHLORIDE 1000 MILLILITER(S): 9 INJECTION INTRAMUSCULAR; INTRAVENOUS; SUBCUTANEOUS at 13:30

## 2018-09-02 RX ADMIN — Medication 25 MILLIGRAM(S): at 05:14

## 2018-09-02 RX ADMIN — SODIUM CHLORIDE 30 MILLILITER(S): 9 INJECTION INTRAMUSCULAR; INTRAVENOUS; SUBCUTANEOUS at 20:00

## 2018-09-02 RX ADMIN — Medication 25 MILLIGRAM(S): at 14:03

## 2018-09-02 RX ADMIN — DORNASE ALFA 2.5 MILLIGRAM(S): 1 SOLUTION RESPIRATORY (INHALATION) at 11:30

## 2018-09-02 RX ADMIN — Medication 10 MILLIGRAM(S): at 22:15

## 2018-09-02 RX ADMIN — MEROPENEM 100 MILLIGRAM(S): 1 INJECTION INTRAVENOUS at 05:14

## 2018-09-02 RX ADMIN — HEPARIN SODIUM 5000 UNIT(S): 5000 INJECTION INTRAVENOUS; SUBCUTANEOUS at 05:14

## 2018-09-02 RX ADMIN — Medication 100 MILLIGRAM(S): at 21:06

## 2018-09-02 RX ADMIN — POTASSIUM PHOSPHATE, MONOBASIC POTASSIUM PHOSPHATE, DIBASIC 62.5 MILLIMOLE(S): 236; 224 INJECTION, SOLUTION INTRAVENOUS at 06:40

## 2018-09-02 RX ADMIN — Medication 25 MILLIGRAM(S): at 21:06

## 2018-09-02 RX ADMIN — Medication 100 MILLIGRAM(S): at 14:03

## 2018-09-02 RX ADMIN — SODIUM CHLORIDE 30 MILLILITER(S): 9 INJECTION INTRAMUSCULAR; INTRAVENOUS; SUBCUTANEOUS at 08:00

## 2018-09-02 RX ADMIN — LEVALBUTEROL 0.63 MILLIGRAM(S): 1.25 SOLUTION, CONCENTRATE RESPIRATORY (INHALATION) at 15:49

## 2018-09-02 RX ADMIN — HEPARIN SODIUM 5000 UNIT(S): 5000 INJECTION INTRAVENOUS; SUBCUTANEOUS at 21:06

## 2018-09-02 RX ADMIN — SODIUM CHLORIDE 4 MILLILITER(S): 9 INJECTION INTRAMUSCULAR; INTRAVENOUS; SUBCUTANEOUS at 03:39

## 2018-09-02 RX ADMIN — Medication 500 MICROGRAM(S): at 11:29

## 2018-09-02 RX ADMIN — FAMOTIDINE 20 MILLIGRAM(S): 10 INJECTION INTRAVENOUS at 14:03

## 2018-09-02 NOTE — PROGRESS NOTE ADULT - ASSESSMENT
83 y.o. female with hx of COPD, no significant cardiac history presenting with intraop- postop SVT s/p Right upper lobectomy.     1. Intraop/postop SVT  s/p Right upper lobectomy, found to be in SVT responsive to IV CCB   no further pauses noted on tele, remains NSR with frequent PACs   Echo with normal LV function     2. Chest pain, atypical   likely referred pain from chest tube   no ecg changes, esther negative  Echo with normal LV function   pain mgt following     3. HTN  bp stable  continue current medication regimen     4. S/p Right upper lobectomy   Care per CTICU     5. COPD  stable on inhalers     6. Pafib  no further events noted , remains in NSR  continue BB   chadsvasc 3 - start on anticoagulation when cleared by CTS     dvt ppx

## 2018-09-02 NOTE — PROGRESS NOTE ADULT - SUBJECTIVE AND OBJECTIVE BOX
CARDIOLOGY FOLLOW UP - Dr. Arevalo    CC no chest pain or increase SOB      PHYSICAL EXAM:  T(C): 36.8 (09-02-18 @ 04:00), Max: 36.9 (09-02-18 @ 00:00)  HR: 98 (09-02-18 @ 08:00) (89 - 118)  BP: 131/69 (09-02-18 @ 08:00) (118/78 - 159/71)  RR: 24 (09-02-18 @ 08:00) (19 - 34)  SpO2: 99% (09-02-18 @ 08:00) (91% - 100%)  Wt(kg): --  I&O's Summary    01 Sep 2018 07:01  -  02 Sep 2018 07:00  --------------------------------------------------------  IN: 1675 mL / OUT: 1465 mL / NET: 210 mL    02 Sep 2018 07:01  -  02 Sep 2018 09:32  --------------------------------------------------------  IN: 30 mL / OUT: 20 mL / NET: 10 mL        Appearance: Normal	  Cardiovascular: Normal S1 S2,RRR   Respiratory: bl rhonchi + chest tube   Gastrointestinal:  Soft, Non-tender, + BS	+ NG tube    Extremities: Normal range of motion, No clubbing, cyanosis or edema        MEDICATIONS  (STANDING):  acetaminophen  IVPB. 1000 milliGRAM(s) IV Intermittent once  docusate sodium Liquid 100 milliGRAM(s) Oral three times a day  dornase selina Solution 2.5 milliGRAM(s) Inhalation daily  famotidine    Tablet 20 milliGRAM(s) Oral daily  fluconAZOLE IVPB      fluconAZOLE IVPB 200 milliGRAM(s) IV Intermittent every 24 hours  heparin  Injectable 5000 Unit(s) SubCutaneous every 8 hours  ipratropium    for Nebulization 500 MICROGram(s) Nebulizer every 6 hours  levalbuterol Inhalation 0.63 milliGRAM(s) Inhalation every 6 hours  meropenem  IVPB 1000 milliGRAM(s) IV Intermittent every 8 hours  metoprolol tartrate 25 milliGRAM(s) Oral every 8 hours  sodium chloride 0.9%. 1000 milliLiter(s) (30 mL/Hr) IV Continuous <Continuous>  vancomycin  IVPB 1000 milliGRAM(s) IV Intermittent every 24 hours  vancomycin  IVPB          TELEMETRY: NSR- sinus tachycardia HER / PAC 	    ECG:  	  RADIOLOGY:   DIAGNOSTIC TESTING:  [ ] Echocardiogram:  [ ]  Catheterization:  [ ] Stress Test:    OTHER: 	  < from: Xray Chest 1 View- PORTABLE-Routine (09.01.18 @ 08:37) >  IMPRESSION:     Unchanged bilateral pleural effusions and consolidation in both lower   lobes.                < end of copied text >    LABS:	 	                                12.0   17.27 )-----------( 347      ( 02 Sep 2018 06:31 )             37.0     09-02    137  |  98  |  17  ----------------------------<  104<H>  4.2   |  23  |  0.40<L>    Ca    8.8      02 Sep 2018 04:25  Phos  2.2     09-02  Mg     1.9     09-02

## 2018-09-02 NOTE — PROGRESS NOTE ADULT - ASSESSMENT
83 y.o. female with hx of COPD, no significant cardiac history presenting with intraop- postop SVT s/p Right upper lobectomy.  Pt has been c/o fatigue, weight loss of 15 lbs in last 6 months, and reports occasional nonproductive cough.  CXR and subsequent CT scan, reports multiple right lung nodules.  Pt admitted  for lung resection.  Pt underwent lobectomy of right lung  with video-assisted thoracoscopic surgery  on 8/27/2018 and flexible bronchoscopy with bronchopulmonary lavage.  She has a right chest tube in place.      Post op course complicated by SVT and A.fib with pauses and pneumothorax.         Pt currently without fever.  WBC 14.1 on 8/28 --> 10.5.  Pt was given a dose of rocephin on 8/30 for possible pneumonia.  During bedside bronchoscopy pt noted to have copious murky secretions concerning for infection and oropharyngeal candidiases.  ID consult called for further antibiotic managment.     Problem/Plan - 1:    ·	Pna    - Pt s/p RUL lobectomy, s/p bronch/lavage, chest tube to suction.  Noted to have copious purulent secretions and oropharyngeal candidiasis.      - Recommend broad coverage, cont vanco/meropenem and diflucan.  Complete 2 weeks of diflucan to cover for possible esophageal candidiasis (through 9/12)    -  f/u vanco trough prior to 3rd dose.    - Respiratory cultures sent from 8/30 and 8/31 - thus far growing Serratia marcesans.   If patient spikes fever, send blood cultures x 2.    - Serial chest xrays.    - Chest tube management as per CTS    * Cont present antibiotics. If no MRSA, can d/c vancomycin    * WBC elevated today.  f/u repeat cbc in AM    Will follow,    Margareth Hodge  [phone]    d/w CT icu team

## 2018-09-02 NOTE — PROGRESS NOTE ADULT - ATTENDING COMMENTS
agree with above NP note.  cv stable  contbb  cont to monitor   eps f/u
agree with above NP note.  cv stable  contbb  cont to monitor   eps f/u
agree with above NP note.  cv stable  paf, pauses  contbb  cont to monitor   eps f/u
agree with above NP note.  cv stable  paf, pauses  hold ccb, contbb  cont to monitor   eps f/u  chest pain, atypical   no ecg changes  esther negative
agree with NP note above   brief postop PAF noted  EP recs appreciated  BB  AC

## 2018-09-02 NOTE — PROGRESS NOTE ADULT - SUBJECTIVE AND OBJECTIVE BOX
LIAM VELOZ            [id number]         No Known Allergies               83 y.o. female with hx of COPD, c/o SOB, dyspnea on exertion, fatigue, weight loss of 15 lbs in last 6 months, reports occasional nonproductive cough, s/p CXR, followed by CT scan, reports multiple right lung nodules noted admitted  for lung resection.      Procedure:  RVATS /   Right upper lobectomy   08/27/2018 08/30, 08/31, 09/01 Bronch       Issues:   Pneumonia  Atelectasis  Shortness of breath / Bronchospasm / COPD exacerbation  SVT / a-FIB  Pneumothorax  Air leak              Postop pain  HTN                 Home Medications:  Advair Diskus 250 mcg-50 mcg inhalation powder: 1 puff(s) inhaled 2 times a day (27 Aug 2018 12:42)  Spiriva 18 mcg inhalation capsule: 1 cap(s) inhaled once a day (27 Aug 2018 12:42)      PAST MEDICAL & SURGICAL HISTORY:  Lung nodules  COPD (Chronic Obstructive Pulmonary Disease): diagnose in ~2013  Colon Polyps  History of Osteoporosis  History of Cataract Surgery: left eye  Hip Replacement: bilateral        ICU Vital Signs Last 24 Hrs  T(C): 36.8 (02 Sep 2018 04:00), Max: 36.9 (02 Sep 2018 00:00)  T(F): 98.3 (02 Sep 2018 04:00), Max: 98.5 (02 Sep 2018 00:00)  HR: 118 (02 Sep 2018 05:00) (86 - 118)  BP: 136/84 (02 Sep 2018 05:00) (118/78 - 159/71)  BP(mean): 91 (02 Sep 2018 05:00) (70 - 118)  ABP: --  ABP(mean): --  RR: 25 (02 Sep 2018 05:00) (19 - 34)  SpO2: 99% (02 Sep 2018 05:00) (91% - 100%)    I&O's Detail    31 Aug 2018 07:01  -  01 Sep 2018 07:00  --------------------------------------------------------  IN:    IV PiggyBack: 100 mL    ns in tub fed  [id number]: 30 mL    sodium chloride 0.9%.: 720 mL    Vivonex RTH: 80 mL  Total IN: 930 mL    OUT:    Chest Tube: 355 mL    Indwelling Catheter - Urethral: 1200 mL  Total OUT: 1555 mL    Total NET: -625 mL      01 Sep 2018 07:01  -  02 Sep 2018 06:02  --------------------------------------------------------  IN:    Enteral Tube Flush: 125 mL    IV PiggyBack: 450 mL    sodium chloride 0.9%.: 660 mL    Vivonex RTH: 80 mL  Total IN: 1315 mL    OUT:    Chest Tube: 240 mL    Indwelling Catheter - Urethral: 1075 mL  Total OUT: 1315 mL    Total NET: 0 mL        CAPILLARY BLOOD GLUCOSE          Home Medications:  Advair Diskus 250 mcg-50 mcg inhalation powder: 1 puff(s) inhaled 2 times a day (27 Aug 2018 12:42)  Spiriva 18 mcg inhalation capsule: 1 cap(s) inhaled once a day (27 Aug 2018 12:42)      MEDICATIONS  (STANDING):  acetaminophen  IVPB. 1000 milliGRAM(s) IV Intermittent once  docusate sodium Liquid 100 milliGRAM(s) Oral three times a day  dornase selina Solution 2.5 milliGRAM(s) Inhalation daily  famotidine    Tablet 20 milliGRAM(s) Oral daily  fluconAZOLE IVPB      fluconAZOLE IVPB 200 milliGRAM(s) IV Intermittent every 24 hours  heparin  Injectable 5000 Unit(s) SubCutaneous every 8 hours  ipratropium    for Nebulization 500 MICROGram(s) Nebulizer every 6 hours  levalbuterol Inhalation 0.63 milliGRAM(s) Inhalation every 6 hours  meropenem  IVPB 1000 milliGRAM(s) IV Intermittent every 8 hours  metoprolol tartrate 25 milliGRAM(s) Oral every 8 hours  potassium phosphate IVPB 15 milliMole(s) IV Intermittent once  sodium chloride 0.9%. 1000 milliLiter(s) (30 mL/Hr) IV Continuous <Continuous>  vancomycin  IVPB 1000 milliGRAM(s) IV Intermittent every 24 hours  vancomycin  IVPB        MEDICATIONS  (PRN):  acetaminophen  IVPB. 750 milliGRAM(s) IV Intermittent once PRN Moderate Pain (4 - 6)  melatonin 3 milliGRAM(s) Oral at bedtime PRN Sleep          Physical exam:       General:               Pt is awake, alert, C/O shortness of breath but not in any distress                                                  Neuro:                  Nonfocal                             Cardiovascular:     S1 & S2, regular / irregular                          Respiratory:         Air entry is fair and equal on both sides, has bilateral rhonchi /  conducted sounds                           GI:                         Soft, nondistended and nontender, Bowel sounds active                            Ext:                        No cyanosis or edema                               Labs:                                                                           13.1   12.08 )-----------( 309      ( 01 Sep 2018 04:00 )             38.7             09-02    137  |  98  |  17  ----------------------------<  104<H>  4.2   |  23  |  0.40<L>    Ca    8.8      02 Sep 2018 04:25  Phos  2.2     09-02  Mg     1.9     09-02                          CXR:    < from: Xray Chest 1 View- PORTABLE-Routine (09.01.18 @ 08:37) >  LINES/TUBES: Unchanged feeding tube coursing below the diaphragm with tip   outside the field of view of this radiograph. Unchanged right chest tube.  LUNGS/PLEURA: Unchanged small bilateral pleural effusions with patchy   consolidation in both lower lungs. No definite pneumothorax.  MEDIASTINUM: Heart size cannot adequately be assessed on this projection.  OTHER: None.        Plan:    General: 83yFemale s/p RVATS /   Right upper lobectomy   08/27/2018 experiencing  pain with deep breathing. Postop period is significant for SVT / A-Fib. Pt had episodes of pauses on Cardizem EPS evaluated and recommended Lopressor 25mg bid & PPM if experiences pauses again.                            Neuro:                                         Pain control with  Tylenol only, avoid narcotics                            Cardiovascular:                                          Continue hemodynamic monitoring.    SVT / A-fb: Continue Lopressor 25mg q8hrs, increase as tolerated &  no more pauses     HTN:  Increase lopressor as tolerated                            Respiratory:                                              Pt is on 2L  nasal canula,       Rt lower pneumonia: On Meropenem / Vanco / Diflucan     Atelectasis - Aggressive pulmonary toilet + Pulmozyme + Saline inhalations                                         mild SOB, not in any distress.                                         Using incentive spirometry 500cc                                         Monitor chest tube output - on water seal                                         Pneumothorax with small air leak.                                                               COPD: Continue  Xopenex / Atrovent nebulizer Rx.                             GI                                         NPO  for possible bronch     Continue NGT feeds                                         Continue GI prophylaxis with  Protonix                                         Continue Zofran / Reglan for nausea - PRN	                                                                 Renal:                                         Continue LR 30cc/hr                                         Monitor I/Os and electrolytes                                                                                        Hem/ Onc:                                                                                  Monitor chest tube output &  signs of bleeding.                                          Follow CBC in AM                           Infectious disease:      Rt lower pneumonia: On Meropenem / Vanco / Diflucan        Monitor for fever / leukocytosis.                                         All surgical incision / chest tube  sites look clean                            Endocrine                                             Continue Accu-Checks with coverage    Pt is on SQ Heparin and Venodyne boots for DVT prophylaxis.     Pertinent clinical, laboratory, radiographic, hemodynamic, echocardiographic, respiratory data, microbiologic data and chart were reviewed and analyzed frequently throughout the course of the day and night  Patient seen, examined and plan discussed with CT Surgeon / CTICU team during rounds.    Pt's status discussed with family at bedside, updated status    I have spent  45   minutes of critical care time with this pt between  00am  and  8am                   Michael Moya MD

## 2018-09-02 NOTE — PROGRESS NOTE ADULT - SUBJECTIVE AND OBJECTIVE BOX
Infectious Diseases progress note:    Subjective:  No new fevers.  WBC elevated.  cultures growing serratia marcesans    ROS:  CONSTITUTIONAL:  No fever, chills, rigors  CARDIOVASCULAR:  No chest pain or palpitations  RESPIRATORY:   No SOB, cough, dyspnea on exertion.  No wheezing  GASTROINTESTINAL:  No abd pain, N/V, diarrhea/constipation  EXTREMITIES:  No swelling or joint pain  GENITOURINARY:  No burning on urination, increased frequency or urgency.  No flank pain  NEUROLOGIC:  No HA, visual disturbances  SKIN: No rashes    Allergies    No Known Allergies    Intolerances        ANTIBIOTICS/RELEVANT:  antimicrobials  fluconAZOLE IVPB      fluconAZOLE IVPB 200 milliGRAM(s) IV Intermittent every 24 hours  meropenem  IVPB 1000 milliGRAM(s) IV Intermittent every 8 hours  vancomycin  IVPB 1000 milliGRAM(s) IV Intermittent every 24 hours  vancomycin  IVPB        immunologic:    OTHER:  acetaminophen  IVPB. 1000 milliGRAM(s) IV Intermittent once  acetaminophen  IVPB. 750 milliGRAM(s) IV Intermittent once PRN  docusate sodium Liquid 100 milliGRAM(s) Oral three times a day  famotidine    Tablet 20 milliGRAM(s) Oral daily  heparin  Injectable 5000 Unit(s) SubCutaneous every 8 hours  ipratropium    for Nebulization 500 MICROGram(s) Nebulizer every 6 hours  levalbuterol Inhalation 0.63 milliGRAM(s) Inhalation every 6 hours  melatonin 3 milliGRAM(s) Oral at bedtime PRN  metoprolol tartrate 25 milliGRAM(s) Oral every 8 hours  sodium chloride 0.9%. 1000 milliLiter(s) IV Continuous <Continuous>      Objective:  Vital Signs Last 24 Hrs  T(C): 36.7 (03 Sep 2018 00:00), Max: 37.1 (02 Sep 2018 16:00)  T(F): 98 (03 Sep 2018 00:00), Max: 98.7 (02 Sep 2018 16:00)  HR: 94 (03 Sep 2018 00:00) (85 - 118)  BP: 136/79 (03 Sep 2018 00:00) (121/66 - 161/68)  BP(mean): 92 (03 Sep 2018 00:00) (77 - 95)  RR: 29 (03 Sep 2018 00:00) (20 - 29)  SpO2: 95% (03 Sep 2018 00:00) (90% - 100%)    PHYSICAL EXAM:  Constitutional:NAD  Eyes:ELE, EOMI  Ear/Nose/Throat: no thrush, mucositis.  Moist mucous membranes	  Neck:no JVD, no lymphadenopathy, supple  Respiratory: CTA lanette, rt chest tube  Cardiovascular: S1S2 RRR, no murmurs  Gastrointestinal:soft, nontender,  nondistended (+) BS  Extremities:no e/e/c  Skin:  no rashes, open wounds or ulcerations        LABS:                        12.0   17.27 )-----------( 347      ( 02 Sep 2018 06:31 )             37.0     09-02    137  |  98  |  17  ----------------------------<  104<H>  4.2   |  23  |  0.40<L>    Ca    8.8      02 Sep 2018 04:25  Phos  2.2     09-02  Mg     1.9     09-02                  Vancomycin Level, Trough: 3.5 ug/mL (09-02 @ 13:10)              MICROBIOLOGY:    Culture - Respiratory with Gram Stain (08.31.18 @ 13:46)    -  Gentamicin: S <=1 JOANNE    -  Imipenem: S <=1 JOANNE    -  Levofloxacin: S <=1 JOANNE    -  Piperacillin/Tazobactam: S <=8 JOANNE    -  Tigecycline: S <=1 JOANNE    -  Meropenem: S <=1 JOANNE    -  Tobramycin: S <=2 JOANNE    -  Trimethoprim/Sulfamethoxazole: S <=0.5/9.5 JOANNE    Culture - Respiratory:   Normal Respiratory Mallory Absent    Gram Stain Sputum:   GPR Gram Positive Rods  QUANTITY OF BACTERIA SEEN: RARE (1+)  WBC^White Blood Cells  QNTY CELLS IN GRAM STAIN: MANY (4+)    -  Amikacin: S <=8 JOANNE    -  Ampicillin: R >16 JOANNE    -  Ampicillin/Sulbactam: R >16/8 JOANNE    -  Aztreonam: S <=4 JOANNE    -  Cefazolin: R >16 JOANNE    -  Cefepime: S <=2 JOANNE    -  Cefoxitin: R 16 JOANNE    -  Ceftazidime: S <=1 JOANNE    -  Ceftriaxone: S <=1 JOANNE    -  Ciprofloxacin: S <=0.5 JOANNE    -  Ertapenem: S <=0.5 JOANNE    Specimen Source: BRONCHIAL LAVAGE    Organism Identification: Serratia marcescens    Organism: Serratia marcescens  QUANTITY OF GROWTH: MODERATE    Method Type: NEGATIVE JOANNE 43    Culture - Body Fluid with Gram Stain (08.31.18 @ 12:39)    Gram Stain:   NOS^No Organisms Seen  WBC^White Blood Cells  QNTY CELLS IN GRAM STAIN: FEW (2+)    Culture - Body Fluid:   NO GROWTH - PRELIMINARY RESULTS  NO ORGANISMS ISOLATED AT 24 HOURS    Specimen Source: PLEURAL FLUID          RADIOLOGY & ADDITIONAL STUDIES:    < from: Xray Chest 1 View- PORTABLE-Routine (09.02.18 @ 08:46) >  IMPRESSION: There is a right-sided chest tube as on the prior study.   There are small bilateral pleural effusions with right mid to lower lung   areas of airspace disease unchanged. There is relative lucency overlying   the right apex without appreciable pleural edge unchanged. Continued   follow-up is recommended.    < end of copied text >

## 2018-09-03 LAB
BUN SERPL-MCNC: 16 MG/DL — SIGNIFICANT CHANGE UP (ref 7–23)
CA-I BLD-SCNC: 1.12 MMOL/L — SIGNIFICANT CHANGE UP (ref 1.03–1.23)
CALCIUM SERPL-MCNC: 8.6 MG/DL — SIGNIFICANT CHANGE UP (ref 8.4–10.5)
CHLORIDE SERPL-SCNC: 97 MMOL/L — LOW (ref 98–107)
CO2 SERPL-SCNC: 30 MMOL/L — SIGNIFICANT CHANGE UP (ref 22–31)
CREAT SERPL-MCNC: 0.38 MG/DL — LOW (ref 0.5–1.3)
GLUCOSE SERPL-MCNC: 113 MG/DL — HIGH (ref 70–99)
HCT VFR BLD CALC: 36.3 % — SIGNIFICANT CHANGE UP (ref 34.5–45)
HGB BLD-MCNC: 12 G/DL — SIGNIFICANT CHANGE UP (ref 11.5–15.5)
MAGNESIUM SERPL-MCNC: 1.6 MG/DL — SIGNIFICANT CHANGE UP (ref 1.6–2.6)
MCHC RBC-ENTMCNC: 29.8 PG — SIGNIFICANT CHANGE UP (ref 27–34)
MCHC RBC-ENTMCNC: 33.1 % — SIGNIFICANT CHANGE UP (ref 32–36)
MCV RBC AUTO: 90.1 FL — SIGNIFICANT CHANGE UP (ref 80–100)
NRBC # FLD: 0 — SIGNIFICANT CHANGE UP
PHOSPHATE SERPL-MCNC: 2.5 MG/DL — SIGNIFICANT CHANGE UP (ref 2.5–4.5)
PLATELET # BLD AUTO: 345 K/UL — SIGNIFICANT CHANGE UP (ref 150–400)
PMV BLD: 9.3 FL — SIGNIFICANT CHANGE UP (ref 7–13)
POTASSIUM SERPL-MCNC: 3.1 MMOL/L — LOW (ref 3.5–5.3)
POTASSIUM SERPL-SCNC: 3.1 MMOL/L — LOW (ref 3.5–5.3)
RBC # BLD: 4.03 M/UL — SIGNIFICANT CHANGE UP (ref 3.8–5.2)
RBC # FLD: 13 % — SIGNIFICANT CHANGE UP (ref 10.3–14.5)
SODIUM SERPL-SCNC: 140 MMOL/L — SIGNIFICANT CHANGE UP (ref 135–145)
WBC # BLD: 11.17 K/UL — HIGH (ref 3.8–10.5)
WBC # FLD AUTO: 11.17 K/UL — HIGH (ref 3.8–10.5)

## 2018-09-03 PROCEDURE — 31624 DX BRONCHOSCOPE/LAVAGE: CPT

## 2018-09-03 PROCEDURE — 99291 CRITICAL CARE FIRST HOUR: CPT

## 2018-09-03 PROCEDURE — 71045 X-RAY EXAM CHEST 1 VIEW: CPT | Mod: 26,76

## 2018-09-03 RX ORDER — MIDAZOLAM HYDROCHLORIDE 1 MG/ML
1 INJECTION, SOLUTION INTRAMUSCULAR; INTRAVENOUS ONCE
Qty: 0 | Refills: 0 | Status: DISCONTINUED | OUTPATIENT
Start: 2018-09-03 | End: 2018-09-03

## 2018-09-03 RX ORDER — SODIUM,POTASSIUM PHOSPHATES 278-250MG
1 POWDER IN PACKET (EA) ORAL ONCE
Qty: 0 | Refills: 0 | Status: COMPLETED | OUTPATIENT
Start: 2018-09-03 | End: 2018-09-03

## 2018-09-03 RX ORDER — POTASSIUM CHLORIDE 20 MEQ
10 PACKET (EA) ORAL
Qty: 0 | Refills: 0 | Status: COMPLETED | OUTPATIENT
Start: 2018-09-03 | End: 2018-09-03

## 2018-09-03 RX ORDER — POTASSIUM CHLORIDE 20 MEQ
40 PACKET (EA) ORAL ONCE
Qty: 0 | Refills: 0 | Status: DISCONTINUED | OUTPATIENT
Start: 2018-09-03 | End: 2018-09-03

## 2018-09-03 RX ORDER — MAGNESIUM SULFATE 500 MG/ML
2 VIAL (ML) INJECTION ONCE
Qty: 0 | Refills: 0 | Status: COMPLETED | OUTPATIENT
Start: 2018-09-03 | End: 2018-09-03

## 2018-09-03 RX ADMIN — HEPARIN SODIUM 5000 UNIT(S): 5000 INJECTION INTRAVENOUS; SUBCUTANEOUS at 21:06

## 2018-09-03 RX ADMIN — MIDAZOLAM HYDROCHLORIDE 1 MILLIGRAM(S): 1 INJECTION, SOLUTION INTRAMUSCULAR; INTRAVENOUS at 10:00

## 2018-09-03 RX ADMIN — Medication 250 MILLIGRAM(S): at 12:00

## 2018-09-03 RX ADMIN — Medication 300 MILLIGRAM(S): at 23:52

## 2018-09-03 RX ADMIN — HEPARIN SODIUM 5000 UNIT(S): 5000 INJECTION INTRAVENOUS; SUBCUTANEOUS at 13:19

## 2018-09-03 RX ADMIN — Medication 100 MILLIEQUIVALENT(S): at 03:43

## 2018-09-03 RX ADMIN — Medication 100 MILLIEQUIVALENT(S): at 05:37

## 2018-09-03 RX ADMIN — Medication 25 MILLIGRAM(S): at 21:06

## 2018-09-03 RX ADMIN — Medication 100 MILLIEQUIVALENT(S): at 04:31

## 2018-09-03 RX ADMIN — Medication 1 PACKET(S): at 05:30

## 2018-09-03 RX ADMIN — Medication 500 MICROGRAM(S): at 03:24

## 2018-09-03 RX ADMIN — Medication 100 MILLIGRAM(S): at 21:06

## 2018-09-03 RX ADMIN — Medication 100 MILLIGRAM(S): at 13:19

## 2018-09-03 RX ADMIN — Medication 500 MICROGRAM(S): at 22:19

## 2018-09-03 RX ADMIN — Medication 3 MILLIGRAM(S): at 22:00

## 2018-09-03 RX ADMIN — Medication 25 MILLIGRAM(S): at 13:20

## 2018-09-03 RX ADMIN — LEVALBUTEROL 0.63 MILLIGRAM(S): 1.25 SOLUTION, CONCENTRATE RESPIRATORY (INHALATION) at 03:24

## 2018-09-03 RX ADMIN — MEROPENEM 100 MILLIGRAM(S): 1 INJECTION INTRAVENOUS at 05:30

## 2018-09-03 RX ADMIN — Medication 25 MILLIGRAM(S): at 05:30

## 2018-09-03 RX ADMIN — Medication 50 GRAM(S): at 03:43

## 2018-09-03 RX ADMIN — MEROPENEM 100 MILLIGRAM(S): 1 INJECTION INTRAVENOUS at 21:06

## 2018-09-03 RX ADMIN — Medication 500 MICROGRAM(S): at 10:53

## 2018-09-03 RX ADMIN — LEVALBUTEROL 0.63 MILLIGRAM(S): 1.25 SOLUTION, CONCENTRATE RESPIRATORY (INHALATION) at 15:27

## 2018-09-03 RX ADMIN — FLUCONAZOLE 100 MILLIGRAM(S): 150 TABLET ORAL at 13:19

## 2018-09-03 RX ADMIN — MEROPENEM 100 MILLIGRAM(S): 1 INJECTION INTRAVENOUS at 13:20

## 2018-09-03 RX ADMIN — FAMOTIDINE 20 MILLIGRAM(S): 10 INJECTION INTRAVENOUS at 13:19

## 2018-09-03 RX ADMIN — LEVALBUTEROL 0.63 MILLIGRAM(S): 1.25 SOLUTION, CONCENTRATE RESPIRATORY (INHALATION) at 22:19

## 2018-09-03 RX ADMIN — HEPARIN SODIUM 5000 UNIT(S): 5000 INJECTION INTRAVENOUS; SUBCUTANEOUS at 05:30

## 2018-09-03 RX ADMIN — Medication 100 MILLIGRAM(S): at 05:30

## 2018-09-03 RX ADMIN — Medication 500 MICROGRAM(S): at 15:27

## 2018-09-03 RX ADMIN — LEVALBUTEROL 0.63 MILLIGRAM(S): 1.25 SOLUTION, CONCENTRATE RESPIRATORY (INHALATION) at 10:53

## 2018-09-03 RX ADMIN — Medication 3 MILLIGRAM(S): at 00:00

## 2018-09-03 RX ADMIN — SODIUM CHLORIDE 30 MILLILITER(S): 9 INJECTION INTRAMUSCULAR; INTRAVENOUS; SUBCUTANEOUS at 12:00

## 2018-09-03 NOTE — PROGRESS NOTE ADULT - ASSESSMENT
83 y.o. female with hx of COPD, no significant cardiac history presenting with intraop- postop SVT s/p Right upper lobectomy.     1. Intraop/postop SVT  s/p Right upper lobectomy, found to be in SVT responsive to IV CCB   no further pauses noted on tele, remains NSR with frequent PACs   Echo with normal LV function with severe pulmonary hypertension  continue on beta blocker no recurrence of arrythmia  2. HTN  bp stable  continue current medication regimen   add ace or arb as needed  4. S/p Right upper lobectomy   Care per CTICU     5. COPD  stable on inhalers     dvt ppx

## 2018-09-03 NOTE — PROGRESS NOTE ADULT - SUBJECTIVE AND OBJECTIVE BOX
Subjective: Patient seen and examined. No new events except as noted.   Feels well mild sob no palpitations  no recurrence of SVT  echo noral LV function no valve disease severe pulmonary hypertension (?cor pulmonale)  MEDICATIONS:  MEDICATIONS  (STANDING):  acetaminophen  IVPB. 1000 milliGRAM(s) IV Intermittent once  docusate sodium Liquid 100 milliGRAM(s) Oral three times a day  famotidine    Tablet 20 milliGRAM(s) Oral daily  fluconAZOLE IVPB      fluconAZOLE IVPB 200 milliGRAM(s) IV Intermittent every 24 hours  heparin  Injectable 5000 Unit(s) SubCutaneous every 8 hours  ipratropium    for Nebulization 500 MICROGram(s) Nebulizer every 6 hours  levalbuterol Inhalation 0.63 milliGRAM(s) Inhalation every 6 hours  meropenem  IVPB 1000 milliGRAM(s) IV Intermittent every 8 hours  metoprolol tartrate 25 milliGRAM(s) Oral every 8 hours  sodium chloride 0.9%. 1000 milliLiter(s) (30 mL/Hr) IV Continuous <Continuous>  vancomycin  IVPB 1000 milliGRAM(s) IV Intermittent every 24 hours  vancomycin  IVPB          PHYSICAL EXAM:  T(C): 36.8 (09-03-18 @ 08:00), Max: 37.1 (09-02-18 @ 16:00)  HR: 86 (09-03-18 @ 08:00) (76 - 108)  BP: 178/71 (09-03-18 @ 07:00) (121/66 - 178/71)  RR: 24 (09-03-18 @ 08:00) (19 - 34)  SpO2: 94% (09-03-18 @ 08:00) (90% - 98%)  Wt(kg): --  I&O's Summary    02 Sep 2018 07:01  -  03 Sep 2018 07:00  --------------------------------------------------------  IN: 2550 mL / OUT: 2214 mL / NET: 336 mL    03 Sep 2018 07:01  -  03 Sep 2018 08:26  --------------------------------------------------------  IN: 30 mL / OUT: 20 mL / NET: 10 mL          Appearance: Normal	  HEENT:   Normal oral mucosa, PERRL, EOMI	  Cardiovascular: Normal S1 S2, No JVD, No murmurs ,  Respiratory: Lungs clear to auscultation, normal effort 	  Gastrointestinal:  Soft, Non-tender, + BS	  Skin: No rashes, No ecchymoses, No cyanosis, warm to touch  Musculoskeletal: Normal range of motion, normal strength  Psychiatry:  Mood & affect appropriate  Ext: No edema  Peripheral pulses palpable 2+ bilaterally      LABS:    CARDIAC MARKERS:                                12.0   11.17 )-----------( 345      ( 03 Sep 2018 02:15 )             36.3     09-03    140  |  97<L>  |  16  ----------------------------<  113<H>  3.1<L>   |  30  |  0.38<L>    Ca    8.6      03 Sep 2018 02:15  Phos  2.5     09-03  Mg     1.6     09-03      proBNP:   Lipid Profile:   HgA1c:   TSH:     < from: Transthoracic Echocardiogram (08.28.18 @ 15:54) >  CONCLUSIONS:  1. Mitral annular calcification, otherwise normal mitral  valve. Minimal mitral regurgitation.  2. Aortic valve leaflet morphology not well visualized.  Mild aortic regurgitation.  3. Normal left ventricular internal dimensions and wall  thicknesses.  4. Endocardium not well visualized; grossly normal left  ventricular systolic function.  5. Unable to accurately evaluate right ventricular size or  systolic function.  6. Estimated right ventricular systolic pressure equals 62  mm Hg, assuming right atrial pressure equals 10 mm Hg,  consistent with severe pulmonary hypertension.  ------------------------------------------------------------------------  Confirmed on  8/28/2018 - 16:57:26 by Roni Rios M.D.  ------------------------------------------------------------------------    < end of copied text >

## 2018-09-03 NOTE — PROGRESS NOTE ADULT - SUBJECTIVE AND OBJECTIVE BOX
83 F PMH  COPD, OP, cataracts, w cc of SOB  The pt developed SOB, REED, occasional nonproductive cough, fatigue, weight loss of 15 lbs in last 6 months. A CXR, followed by CT scan revealed multiple right lung nodules.    [id number]:  RVATS /   Right upper lobectomy       Issues:   Atelectasis  Shortness of breath / Bronchospasm / COPD exacerbation  SVT / a-FIB  Pneumothorax  Air leak              Postop pain  HTN    Home Medications:  Advair Diskus 250 mcg-50 mcg inhalation powder: 1 puff(s) inhaled 2 times a day (27 Aug 2018 12:42)  Spiriva 18 mcg inhalation capsule: 1 cap(s) inhaled once a day (27 Aug 2018 12:42)    PAST MEDICAL & SURGICAL HISTORY:  Lung nodules  COPD (Chronic Obstructive Pulmonary Disease): diagnose in ~2013  Colon Polyps  History of Osteoporosis  History of Cataract Surgery: left eye  Hip Replacement: bilateral    MEDICATIONS  (STANDING):  acetaminophen  IVPB. 1000 milliGRAM(s) IV Intermittent once  docusate sodium Liquid 100 milliGRAM(s) Oral three times a day  famotidine    Tablet 20 milliGRAM(s) Oral daily  fluconAZOLE IVPB      fluconAZOLE IVPB 200 milliGRAM(s) IV Intermittent every 24 hours  heparin  Injectable 5000 Unit(s) SubCutaneous every 8 hours  ipratropium    for Nebulization 500 MICROGram(s) Nebulizer every 6 hours  levalbuterol Inhalation 0.63 milliGRAM(s) Inhalation every 6 hours  meropenem  IVPB 1000 milliGRAM(s) IV Intermittent every 8 hours  metoprolol tartrate 25 milliGRAM(s) Oral every 8 hours  sodium chloride 0.9%. 1000 milliLiter(s) (30 mL/Hr) IV Continuous <Continuous>  vancomycin  IVPB 1000 milliGRAM(s) IV Intermittent every 24 hours  vancomycin  IVPB        MEDICATIONS  (PRN):  acetaminophen  IVPB. 750 milliGRAM(s) IV Intermittent once PRN Moderate Pain (4 - 6)  melatonin 3 milliGRAM(s) Oral at bedtime PRN Sleep      ICU Vital Signs Last 24 Hrs  T(C): 36.8 (03 Sep 2018 04:00), Max: 37.1 (02 Sep 2018 16:00)  T(F): 98.2 (03 Sep 2018 04:00), Max: 98.7 (02 Sep 2018 16:00)  HR: 97 (03 Sep 2018 05:00) (85 - 108)  BP: 157/74 (03 Sep 2018 05:00) (121/66 - 161/76)  BP(mean): 93 (03 Sep 2018 05:00) (77 - 97)  RR: 25 (03 Sep 2018 05:00) (21 - 34)  SpO2: 91% (03 Sep 2018 05:00) (90% - 100%)      Physical exam:                                                   Gen:                  WD WN in NAD              Neuro:              Alert & Oriented X 3, no focal deficits                          Cardiovascular:   S1 & S2, regular                           Respiratory:        Good B/L Air entry is fair and decreased on both sides R>L, has bilateral conducted sounds / + rhonchi /rales                          GI:                      + bowel sounds, Soft, nondistended and nontender,                           Ext:                     No cyanosis or edema,       I&O's Summary    01 Sep 2018 07:01  -  02 Sep 2018 07:00  --------------------------------------------------------  IN: 1675 mL / OUT: 1465 mL / NET: 210 mL    02 Sep 2018 07:01  -  03 Sep 2018 05:40  --------------------------------------------------------  IN: 2420 mL / OUT: 2174 mL / NET: 246 mL    Labs:                                                                           12.0   11.17 )-----------( 345      ( 03 Sep 2018 02:15 )             36.3                            09-03    140  |  97<L>  |  16  ----------------------------<  113<H>  3.1<L>   |  30  |  0.38<L>    Ca    8.6      03 Sep 2018 02:15  Phos  2.5     09-03  Mg     1.6     09-03    CXR  [id number]  IMPRESSION: There is a right-sided chest tube as on the prior study.   There are small bilateral pleural effusions with right mid to lower lung   areas of airspace disease unchanged. There is relative lucency overlying   the right apex without appreciable pleural edge unchanged. Continued   follow-up is recommended.        Plan:  83 F PMH  COPD, OP, cataracts, w cc of SOB  The pt developed SOB, REED, occasional nonproductive cough, fatigue, weight loss of 15 lbs in last 6 months. A CXR, followed by CT scan revealed multiple right lung nodules.  [id number]:  RVATS /   Right upper lobectomy    Issues:   Atelectasis  Shortness of breath / Bronchospasm / COPD exacerbation  SVT / a-FIB  Pneumothorax  Air leak              Postop pain  HTN    Neuro:                                         Pain control                             Cardiovascular:                                          Continue hemodynamic monitoring.  SVT / A-fb: EPS evaluated and recommended Lopressor 25mg bid, increase as tolerated & PPM if experiences pauses again.   HTN: Increased lopressor to 25mg q8hrs                            Respiratory:  S/P FOB  Pt is onnasal canula,    AM CXR -  rt lower atelectasis – improved post FOB Continue aggressive chest PT / Pulmozyme / 3% saline inhalation / Xopenex and Atrovent nebs                                      mild SOB, not in any distress.                                      Using incentive spirometry 500cc                                      Monitor chest tube output              Monitor clinically and repeat  CXR  later                                                            COPD: Continue  Xopenex / Atrovent nebulizer Rx.                           GI                                         NPO w TFs -on hold for possible FOB                                         Continue GI prophylaxis with Protonix                                         Continue Zofran / Reglan for nausea - PRN	                                                                 Renal:                                         Continue LR 30cc/hr                                         Monitor I/Os and electrolytes                                                                                        Hem/ Onc:                                         SQH & SCDs for VTE prophylaxis                                         Monitor chest tube output &  signs of bleeding.                                          Follow CBCs                           Infectious disease:                                          No signs of infection. Monitor for fever / leukocytosis.                                          All surgical incision / chest tube sites look clean                            Endocrine                                           Continue Accu-Checks with coverage    All available pertinent clinical, laboratory, radiographic, hemodynamic, echocardiographic, respiratory data, microbiologic data and chart were reviewed and analyzed frequently. GI and DVT prophylaxis, glycemic control, head of bed elevation and skin care issues were addressed.  Patient seen, examined and plan discussed with CT Surgery / CTICU team during rounds.    I have spent 45  minutes of critical care time with this pt between  2359 am  and  0800 am    Napoleon Barron MD

## 2018-09-03 NOTE — PROCEDURE NOTE - PROCEDURE
<<-----Click on this checkbox to enter Procedure Flexible bronchoscopy by surgeon at bedside  09/03/2018    Active  [id number]

## 2018-09-04 ENCOUNTER — TRANSCRIPTION ENCOUNTER (OUTPATIENT)
Age: 83
End: 2018-09-04

## 2018-09-04 LAB
ALBUMIN SERPL ELPH-MCNC: 2.3 G/DL — LOW (ref 3.3–5)
ALP SERPL-CCNC: 106 U/L — SIGNIFICANT CHANGE UP (ref 40–120)
ALT FLD-CCNC: 18 U/L — SIGNIFICANT CHANGE UP (ref 4–33)
AST SERPL-CCNC: 21 U/L — SIGNIFICANT CHANGE UP (ref 4–32)
BASOPHILS # BLD AUTO: 0.06 K/UL — SIGNIFICANT CHANGE UP (ref 0–0.2)
BASOPHILS NFR BLD AUTO: 0.6 % — SIGNIFICANT CHANGE UP (ref 0–2)
BILIRUB SERPL-MCNC: 0.4 MG/DL — SIGNIFICANT CHANGE UP (ref 0.2–1.2)
BUN SERPL-MCNC: 19 MG/DL — SIGNIFICANT CHANGE UP (ref 7–23)
CALCIUM SERPL-MCNC: 8.9 MG/DL — SIGNIFICANT CHANGE UP (ref 8.4–10.5)
CHLORIDE SERPL-SCNC: 97 MMOL/L — LOW (ref 98–107)
CO2 SERPL-SCNC: 31 MMOL/L — SIGNIFICANT CHANGE UP (ref 22–31)
CREAT SERPL-MCNC: 0.4 MG/DL — LOW (ref 0.5–1.3)
EOSINOPHIL # BLD AUTO: 0.37 K/UL — SIGNIFICANT CHANGE UP (ref 0–0.5)
EOSINOPHIL NFR BLD AUTO: 3.6 % — SIGNIFICANT CHANGE UP (ref 0–6)
GLUCOSE SERPL-MCNC: 155 MG/DL — HIGH (ref 70–99)
HCT VFR BLD CALC: 35 % — SIGNIFICANT CHANGE UP (ref 34.5–45)
HGB BLD-MCNC: 11.8 G/DL — SIGNIFICANT CHANGE UP (ref 11.5–15.5)
IMM GRANULOCYTES # BLD AUTO: 0.19 # — SIGNIFICANT CHANGE UP
IMM GRANULOCYTES NFR BLD AUTO: 1.9 % — HIGH (ref 0–1.5)
LYMPHOCYTES # BLD AUTO: 0.83 K/UL — LOW (ref 1–3.3)
LYMPHOCYTES # BLD AUTO: 8.2 % — LOW (ref 13–44)
MCHC RBC-ENTMCNC: 30.6 PG — SIGNIFICANT CHANGE UP (ref 27–34)
MCHC RBC-ENTMCNC: 33.7 % — SIGNIFICANT CHANGE UP (ref 32–36)
MCV RBC AUTO: 90.9 FL — SIGNIFICANT CHANGE UP (ref 80–100)
MONOCYTES # BLD AUTO: 0.89 K/UL — SIGNIFICANT CHANGE UP (ref 0–0.9)
MONOCYTES NFR BLD AUTO: 8.8 % — SIGNIFICANT CHANGE UP (ref 2–14)
NEUTROPHILS # BLD AUTO: 7.83 K/UL — HIGH (ref 1.8–7.4)
NEUTROPHILS NFR BLD AUTO: 76.9 % — SIGNIFICANT CHANGE UP (ref 43–77)
NON-GYNECOLOGICAL CYTOLOGY STUDY: SIGNIFICANT CHANGE UP
NRBC # FLD: 0 — SIGNIFICANT CHANGE UP
PLATELET # BLD AUTO: 356 K/UL — SIGNIFICANT CHANGE UP (ref 150–400)
PMV BLD: 9.5 FL — SIGNIFICANT CHANGE UP (ref 7–13)
POTASSIUM SERPL-MCNC: 3.5 MMOL/L — SIGNIFICANT CHANGE UP (ref 3.5–5.3)
POTASSIUM SERPL-SCNC: 3.5 MMOL/L — SIGNIFICANT CHANGE UP (ref 3.5–5.3)
PROT SERPL-MCNC: 5.3 G/DL — LOW (ref 6–8.3)
RBC # BLD: 3.85 M/UL — SIGNIFICANT CHANGE UP (ref 3.8–5.2)
RBC # FLD: 13.2 % — SIGNIFICANT CHANGE UP (ref 10.3–14.5)
SODIUM SERPL-SCNC: 138 MMOL/L — SIGNIFICANT CHANGE UP (ref 135–145)
WBC # BLD: 10.17 K/UL — SIGNIFICANT CHANGE UP (ref 3.8–10.5)
WBC # FLD AUTO: 10.17 K/UL — SIGNIFICANT CHANGE UP (ref 3.8–10.5)

## 2018-09-04 PROCEDURE — 74230 X-RAY XM SWLNG FUNCJ C+: CPT | Mod: 26

## 2018-09-04 PROCEDURE — 99233 SBSQ HOSP IP/OBS HIGH 50: CPT

## 2018-09-04 PROCEDURE — 71045 X-RAY EXAM CHEST 1 VIEW: CPT | Mod: 26,76

## 2018-09-04 RX ORDER — POTASSIUM CHLORIDE 20 MEQ
10 PACKET (EA) ORAL
Qty: 0 | Refills: 0 | Status: COMPLETED | OUTPATIENT
Start: 2018-09-04 | End: 2018-09-04

## 2018-09-04 RX ORDER — CALCIUM GLUCONATE 100 MG/ML
1 VIAL (ML) INTRAVENOUS ONCE
Qty: 0 | Refills: 0 | Status: DISCONTINUED | OUTPATIENT
Start: 2018-09-04 | End: 2018-09-04

## 2018-09-04 RX ORDER — VANCOMYCIN HCL 1 G
750 VIAL (EA) INTRAVENOUS EVERY 12 HOURS
Qty: 0 | Refills: 0 | Status: DISCONTINUED | OUTPATIENT
Start: 2018-09-04 | End: 2018-09-04

## 2018-09-04 RX ORDER — ACETAMINOPHEN 500 MG
750 TABLET ORAL ONCE
Qty: 0 | Refills: 0 | Status: COMPLETED | OUTPATIENT
Start: 2018-09-04 | End: 2018-09-07

## 2018-09-04 RX ORDER — VANCOMYCIN HCL 1 G
1500 VIAL (EA) INTRAVENOUS EVERY 24 HOURS
Qty: 0 | Refills: 0 | Status: DISCONTINUED | OUTPATIENT
Start: 2018-09-04 | End: 2018-09-04

## 2018-09-04 RX ADMIN — HEPARIN SODIUM 5000 UNIT(S): 5000 INJECTION INTRAVENOUS; SUBCUTANEOUS at 15:42

## 2018-09-04 RX ADMIN — Medication 750 MILLIGRAM(S): at 00:22

## 2018-09-04 RX ADMIN — LEVALBUTEROL 0.63 MILLIGRAM(S): 1.25 SOLUTION, CONCENTRATE RESPIRATORY (INHALATION) at 03:15

## 2018-09-04 RX ADMIN — Medication 500 MICROGRAM(S): at 10:43

## 2018-09-04 RX ADMIN — Medication 100 MILLIEQUIVALENT(S): at 06:00

## 2018-09-04 RX ADMIN — Medication 100 MILLIGRAM(S): at 05:38

## 2018-09-04 RX ADMIN — Medication 250 MILLIGRAM(S): at 06:49

## 2018-09-04 RX ADMIN — HEPARIN SODIUM 5000 UNIT(S): 5000 INJECTION INTRAVENOUS; SUBCUTANEOUS at 05:39

## 2018-09-04 RX ADMIN — Medication 25 MILLIGRAM(S): at 15:27

## 2018-09-04 RX ADMIN — LEVALBUTEROL 0.63 MILLIGRAM(S): 1.25 SOLUTION, CONCENTRATE RESPIRATORY (INHALATION) at 10:43

## 2018-09-04 RX ADMIN — MEROPENEM 100 MILLIGRAM(S): 1 INJECTION INTRAVENOUS at 22:05

## 2018-09-04 RX ADMIN — Medication 500 MICROGRAM(S): at 23:20

## 2018-09-04 RX ADMIN — FAMOTIDINE 20 MILLIGRAM(S): 10 INJECTION INTRAVENOUS at 15:27

## 2018-09-04 RX ADMIN — LEVALBUTEROL 0.63 MILLIGRAM(S): 1.25 SOLUTION, CONCENTRATE RESPIRATORY (INHALATION) at 17:20

## 2018-09-04 RX ADMIN — LEVALBUTEROL 0.63 MILLIGRAM(S): 1.25 SOLUTION, CONCENTRATE RESPIRATORY (INHALATION) at 23:17

## 2018-09-04 RX ADMIN — SODIUM CHLORIDE 30 MILLILITER(S): 9 INJECTION INTRAMUSCULAR; INTRAVENOUS; SUBCUTANEOUS at 19:41

## 2018-09-04 RX ADMIN — MEROPENEM 100 MILLIGRAM(S): 1 INJECTION INTRAVENOUS at 15:26

## 2018-09-04 RX ADMIN — SODIUM CHLORIDE 30 MILLILITER(S): 9 INJECTION INTRAMUSCULAR; INTRAVENOUS; SUBCUTANEOUS at 10:42

## 2018-09-04 RX ADMIN — Medication 100 MILLIGRAM(S): at 15:27

## 2018-09-04 RX ADMIN — MEROPENEM 100 MILLIGRAM(S): 1 INJECTION INTRAVENOUS at 05:38

## 2018-09-04 RX ADMIN — Medication 100 MILLIEQUIVALENT(S): at 10:46

## 2018-09-04 RX ADMIN — Medication 25 MILLIGRAM(S): at 05:39

## 2018-09-04 RX ADMIN — FLUCONAZOLE 100 MILLIGRAM(S): 150 TABLET ORAL at 15:26

## 2018-09-04 RX ADMIN — Medication 25 MILLIGRAM(S): at 22:05

## 2018-09-04 RX ADMIN — Medication 100 MILLIGRAM(S): at 22:04

## 2018-09-04 RX ADMIN — Medication 500 MICROGRAM(S): at 03:15

## 2018-09-04 RX ADMIN — Medication 100 MILLIEQUIVALENT(S): at 10:17

## 2018-09-04 RX ADMIN — Medication 500 MICROGRAM(S): at 17:20

## 2018-09-04 RX ADMIN — HEPARIN SODIUM 5000 UNIT(S): 5000 INJECTION INTRAVENOUS; SUBCUTANEOUS at 22:05

## 2018-09-04 NOTE — SWALLOW VFSS/MBS ASSESSMENT ADULT - COMMENTS
83 y.o. female with hx of COPD, c/o SOB, dyspnea on exertion, fatigue, weight loss of 15 lbs in last 6 months, reports occasional nonproductive cough, s/p CXR, followed by CT scan, reports multiple right lung nodules noted admitted  for lung resection.   Procedure:  RVATS/Right upper lobectomy 08/27/2018 08/30, 08/31, 09/01 Bronch    Patient was seen for a Clinical Swallow Eval on 9/1/2018 (See Consult).

## 2018-09-04 NOTE — DIETITIAN INITIAL EVALUATION ADULT. - SIGNS/SYMPTOMS
as evidenced by s/p swallow evaluations, which pt failed as evidenced by 12% wt loss x6m, less than 75% of usual food intake, dysphagia

## 2018-09-04 NOTE — PROGRESS NOTE ADULT - SUBJECTIVE AND OBJECTIVE BOX
Infectious Diseases progress note:    Subjective:  Pt sitting in recliner.  Resting, arousable.  No new fevers.  speech and swallow today.  Pt had blood cultures drawn this morning    ROS:  CONSTITUTIONAL:  No fever, chills, rigors  CARDIOVASCULAR:  No chest pain or palpitations  RESPIRATORY:   No SOB, cough, dyspnea on exertion.  No wheezing  GASTROINTESTINAL:  No abd pain, N/V, diarrhea/constipation  EXTREMITIES:  No swelling or joint pain  GENITOURINARY:  No burning on urination, increased frequency or urgency.  No flank pain  NEUROLOGIC:  No HA, visual disturbances  SKIN: No rashes    Allergies    No Known Allergies    Intolerances        ANTIBIOTICS/RELEVANT:  antimicrobials  fluconAZOLE IVPB      fluconAZOLE IVPB 200 milliGRAM(s) IV Intermittent every 24 hours  meropenem  IVPB 1000 milliGRAM(s) IV Intermittent every 8 hours    immunologic:    OTHER:  acetaminophen  IVPB .. 750 milliGRAM(s) IV Intermittent once PRN  acetaminophen  IVPB. 1000 milliGRAM(s) IV Intermittent once  docusate sodium Liquid 100 milliGRAM(s) Oral three times a day  famotidine    Tablet 20 milliGRAM(s) Oral daily  heparin  Injectable 5000 Unit(s) SubCutaneous every 8 hours  ipratropium    for Nebulization 500 MICROGram(s) Nebulizer every 6 hours  levalbuterol Inhalation 0.63 milliGRAM(s) Inhalation every 6 hours  melatonin 3 milliGRAM(s) Oral at bedtime PRN  metoprolol tartrate 25 milliGRAM(s) Oral every 8 hours  sodium chloride 0.9%. 1000 milliLiter(s) IV Continuous <Continuous>      Objective:  Vital Signs Last 24 Hrs  T(C): 36.7 (04 Sep 2018 20:01), Max: 37.1 (04 Sep 2018 00:00)  T(F): 98.1 (04 Sep 2018 20:01), Max: 98.8 (04 Sep 2018 00:00)  HR: 93 (04 Sep 2018 23:20) (80 - 108)  BP: 162/75 (04 Sep 2018 23:00) (134/64 - 169/83)  BP(mean): 97 (04 Sep 2018 23:00) (75 - 102)  RR: 27 (04 Sep 2018 23:00) (22 - 32)  SpO2: 96% (04 Sep 2018 23:20) (90% - 99%)    PHYSICAL EXAM:  Constitutional:NAD, frail, NGT in place  Eyes:ELE, EOMI  Ear/Nose/Throat: no thrush, mucositis.  Moist mucous membranes	  Neck:no JVD, no lymphadenopathy, supple  Respiratory: CTA lanette, chest tube  Cardiovascular: S1S2 RRR, no murmurs  Gastrointestinal:soft, nontender,  nondistended (+) BS  Extremities:no e/e/c  Skin:  no rashes, open wounds or ulcerations        LABS:                        11.8   10.17 )-----------( 356      ( 04 Sep 2018 03:20 )             35.0     09-04    138  |  97<L>  |  19  ----------------------------<  155<H>  3.5   |  31  |  0.40<L>    Ca    8.9      04 Sep 2018 03:20  Phos  2.5     09-03  Mg     1.6     09-03    TPro  5.3<L>  /  Alb  2.3<L>  /  TBili  0.4  /  DBili  x   /  AST  21  /  ALT  18  /  AlkPhos  106  09-04                Vancomycin Level, Trough: 3.5 ug/mL (09-02 @ 13:10)              MICROBIOLOGY:    Culture - Respiratory with Gram Stain (08.31.18 @ 13:46)    -  Gentamicin: S <=1 JOANNE    -  Imipenem: S <=1 JOANNE    -  Levofloxacin: S <=1 JOANNE    -  Piperacillin/Tazobactam: S <=8 JOANNE    -  Tigecycline: S <=1 JOANNE    -  Meropenem: S <=1 JOANNE    -  Tobramycin: S <=2 JOANNE    -  Trimethoprim/Sulfamethoxazole: S <=0.5/9.5 JOANNE    Culture - Respiratory:   Normal Respiratory Mallory Absent    Gram Stain Sputum:   GPR Gram Positive Rods  QUANTITY OF BACTERIA SEEN: RARE (1+)  WBC^White Blood Cells  QNTY CELLS IN GRAM STAIN: MANY (4+)    -  Amikacin: S <=8 JOANNE    -  Ampicillin: R >16 JOANNE    -  Ampicillin/Sulbactam: R >16/8 JOANNE    -  Aztreonam: S <=4 JOANNE    -  Cefazolin: R >16 JOANNE    -  Cefepime: S <=2 JOANNE    -  Cefoxitin: R 16 JOANNE    -  Ceftazidime: S <=1 JOANNE    -  Ceftriaxone: S <=1 JOANNE    -  Ciprofloxacin: S <=0.5 JOANNE    -  Ertapenem: S <=0.5 JOANNE    Specimen Source: BRONCHIAL LAVAGE    Organism Identification: Serratia marcescens    Organism: Serratia marcescens  QUANTITY OF GROWTH: MODERATE    Method Type: NEGATIVE JOANNE 43          RADIOLOGY & ADDITIONAL STUDIES:    < from: Xray Chest 1 View- PORTABLE-Urgent (09.04.18 @ 10:58) >  IMPRESSION:  Enteric tube extends into left hemiabdomen. Tip is not   included on the image.    There is no significant interval change.    A small right pleural effusion and right mid to lower lung opacity which   could be due to postsurgical change, atelectasis, and/or pneumonia in the   appropriate clinical context, is not significantly changed.    < end of copied text >

## 2018-09-04 NOTE — PROGRESS NOTE ADULT - ASSESSMENT
83 y.o. female with hx of COPD, no significant cardiac history presenting with intraop- postop SVT s/p Right upper lobectomy.  Pt has been c/o fatigue, weight loss of 15 lbs in last 6 months, and reports occasional nonproductive cough.  CXR and subsequent CT scan, reports multiple right lung nodules.  Pt admitted  for lung resection.  Pt underwent lobectomy of right lung  with video-assisted thoracoscopic surgery  on 8/27/2018 and flexible bronchoscopy with bronchopulmonary lavage.  She has a right chest tube in place.      Post op course complicated by SVT and A.fib with pauses and pneumothorax.         Pt currently without fever.  WBC 14.1 on 8/28 --> 10.5.  Pt was given a dose of rocephin on 8/30 for possible pneumonia.  During bedside bronchoscopy pt noted to have copious murky secretions concerning for infection and oropharyngeal candidiases.  ID consult called for further antibiotic managment.     Problem/Plan - 1:    ·	Pna    - Pt s/p RUL lobectomy, s/p bronch/lavage, chest tube to suction.  Noted to have copious purulent secretions and oropharyngeal candidiasis.      - Recommend broad coverage, cont vanco/meropenem and diflucan.  Complete 2 weeks of diflucan to cover for possible esophageal candidiasis (through 9/12)    - Respiratory cultures sent from 8/30 and 8/31 - thus far growing Serratia marcesans.  Agree with d/c vancomycin    - blood cultures sent on 9/4    - Serial chest xrays.    - Chest tube management as per CTS        Will follow,    Margareth Hodge  [phone]    d/w CT icu team

## 2018-09-04 NOTE — SWALLOW VFSS/MBS ASSESSMENT ADULT - PHARYNGEAL PHASE COMMENTS
adequate initiation of the pharyngeal swallow, reduced laryngeal elevation, reduced tongue base retraction, reduced pharyngeal constriction delayed initiation of the pharyngeal swallow, reduced laryngeal elevation, reduced tongue base retraction, reduced pharyngeal constriction

## 2018-09-04 NOTE — SWALLOW VFSS/MBS ASSESSMENT ADULT - ADDITIONAL RECOMMENDATIONS
Consider repeating Cinesophagram to objectively reassess the OroPharyngeal stage swallowing mechanism as patient continues to medically improve.

## 2018-09-04 NOTE — DIETITIAN INITIAL EVALUATION ADULT. - MD RECOMMEND
suggest to change TF formula to Jevity 1.2 to goalof 55ml/h to provide 1584kcal w/73gm protein to meet current needs.

## 2018-09-04 NOTE — SWALLOW VFSS/MBS ASSESSMENT ADULT - DIAGNOSTIC IMPRESSIONS
Patient presents with Mild Oral Stage and Moderate to Severe Pharyngeal Stage Dysphagia. The Oral Stage is characterized by adequate oral containment, slow bolus manipulation, slow tongue motion with slow anterior to posterior transfer of the bolus for puree; piecemeal deglutition with adequate oral clearance post swallow.     The Pharyngeal Stage is characterized by delayed initiation of the pharyngeal swallow (Bolus head is at the laryngeal surface of the epiglottis for Nectar Thick liquids), reduced laryngeal elevation, reduced/poor tongue base retraction resulting in moderate vallecular residue post primary swallow, and reduced pharyngeal constriction resulting in mild pyriform/pharyngeal wall residue post swallow.   There is moderate pharyngeal clearance deficit located in the vallecular/pyriforms/pharyngeal wall post swallow.   There was Laryngeal Penetration during the swallow for Honey Thick Liquids and after the swallow from pharyngeal residue entering into the laryngeal vestibule without retrieval leaving residue in the laryngeal vestibule.   There was Deep Laryngeal Penetration with subsequent Aspiration observed during the swallow for Kirk Thick Liquids. Patient is with reduced laryngeal sensation given a weak coughing response to the Aspiration.  It should also be noted that patient had a baseline cough prior to initiation of the swallow study.   Patient is not an appropriate candidate for a safe/effective oral nutritional intake at this time given the severity of the OroPharyngeal Stage deficits aforementioned.

## 2018-09-04 NOTE — DIETITIAN INITIAL EVALUATION ADULT. - OTHER INFO
Pt seen for critical care LOS.  Pt s/p MBS study this AM, which she failed-recommendation for NPO w/consideration for short and long term non-oral means of nutrition.  Pt admitted for solitary pulmonary nodule.  Pt receiving respiratory treatment at time of interview.  Pt denies food allergies.  She reports that there had been a change in her appetite and food intake PTA w/resultant wt loss.  Pt denies GI issues.

## 2018-09-04 NOTE — PROGRESS NOTE ADULT - SUBJECTIVE AND OBJECTIVE BOX
Remains on broad spectrum abx for pneumonia. Denies CP or palpitations. No further episodes of afib.    MEDICATIONS:  heparin  Injectable 5000 Unit(s) SubCutaneous every 8 hours  metoprolol tartrate 25 milliGRAM(s) Oral every 8 hours  fluconAZOLE IVPB      fluconAZOLE IVPB 200 milliGRAM(s) IV Intermittent every 24 hours  meropenem  IVPB 1000 milliGRAM(s) IV Intermittent every 8 hours  ipratropium    for Nebulization 500 MICROGram(s) Nebulizer every 6 hours  levalbuterol Inhalation 0.63 milliGRAM(s) Inhalation every 6 hours  acetaminophen  IVPB. 1000 milliGRAM(s) IV Intermittent once  melatonin 3 milliGRAM(s) Oral at bedtime PRN  docusate sodium Liquid 100 milliGRAM(s) Oral three times a day  famotidine    Tablet 20 milliGRAM(s) Oral daily  sodium chloride 0.9%. 1000 milliLiter(s) IV Continuous <Continuous>    PHYSICAL EXAM:  T(C): 36.7 (09-04-18 @ 04:00), Max: 37.1 (09-04-18 @ 00:00)  HR: 94 (09-04-18 @ 10:43) (78 - 103)  BP: 152/73 (09-04-18 @ 10:00) (132/59 - 169/83)  RR: 28 (09-04-18 @ 10:00) (21 - 29)  SpO2: 94% (09-04-18 @ 10:43) (90% - 99%)  Wt(kg): --  I&O's Summary    03 Sep 2018 07:01  -  04 Sep 2018 07:00  --------------------------------------------------------  IN: 2765 mL / OUT: 1390 mL / NET: 1375 mL    04 Sep 2018 07:01  -  04 Sep 2018 12:22  --------------------------------------------------------  IN: 205 mL / OUT: 400 mL / NET: -195 mL        Appearance: Normal	  HEENT:   Normal oral mucosa, PERRL, EOMI	  Lymphatic: No lymphadenopathy  Cardiovascular: Normal S1 S2, No JVD, No murmurs, No edema  Respiratory: Lungs clear to auscultation	  Psychiatry: A & O x 3, Mood & affect appropriate  Gastrointestinal:  Soft, Non-tender, + BS	  Skin: No rashes, No ecchymoses, No cyanosis	  Neurologic: Non-focal  Extremities: Normal range of motion, No clubbing, cyanosis or edema  Vascular: Peripheral pulses palpable 2+ bilaterally        LABS:	 	    CBC Full  -  ( 04 Sep 2018 03:20 )  WBC Count : 10.17 K/uL  Hemoglobin : 11.8 g/dL  Hematocrit : 35.0 %  Platelet Count - Automated : 356 K/uL      09-04    138  |  97<L>  |  19  ----------------------------<  155<H>  3.5   |  31  |  0.40<L>  09-03    140  |  97<L>  |  16  ----------------------------<  113<H>  3.1<L>   |  30  |  0.38<L>    Ca    8.9      04 Sep 2018 03:20  Ca    8.6      03 Sep 2018 02:15  Phos  2.5     09-03  Mg     1.6     09-03    TPro  5.3<L>  /  Alb  2.3<L>  /  TBili  0.4  /  DBili  x   /  AST  21  /  ALT  18  /  AlkPhos  106  09-04    TELEMETRY: 	  frequent PVCs, PACs, no afib    ASSESSMENT/PLAN: 	  Patient is an 84 yo F former smoker with COPD with recent REED and weight loss found to have RUL nodule concerning for malignancy now s/p RUL lobectomy, with subsequent development of recurrent SVT, found to be in afib/flutter intermittently.    Paroxsymal Afib/flutter  - no further afib/flutter noted, no pauses, but still with frequent PACs  - continue metop 25mg q8h  - continue to monitor on tele, if develops long/symptomatic pauses on metop, may need PPM  - chadsvasc 3 - pt needs to be on anticoagulation - please start when able  - keep K>4, Mg>2    Discussed with attending.     Prakash Corona MD  Cardiology Fellow

## 2018-09-04 NOTE — DIETITIAN INITIAL EVALUATION ADULT. - ETIOLOGY
related to swallowing dysfunction related to pt meets criteria for severe malnutrition in the context of chronic illness

## 2018-09-04 NOTE — PROGRESS NOTE ADULT - SUBJECTIVE AND OBJECTIVE BOX
LIAM VELOZ                     [id number]      83 y.o. female with hx of COPD, c/o SOB, dyspnea on exertion, fatigue, weight loss of 15 lbs in last 6 months, reports occasional nonproductive cough, s/p CXR, followed by CT scan, reports multiple right lung nodules noted admitted  for lung resection.      Procedure:  RVATS /   Right upper lobectomy   08/27/2018 08/30, 08/31, 09/01 Bronch       Issues:   Pneumonia  Atelectasis  Shortness of breath / Bronchospasm / COPD exacerbation  SVT / a-FIB  Pneumothorax  Air leak              Postop pain  HTN                 Home Medications:  Advair Diskus 250 mcg-50 mcg inhalation powder: 1 puff(s) inhaled 2 times a day (27 Aug 2018 12:42)  Spiriva 18 mcg inhalation capsule: 1 cap(s) inhaled once a day (27 Aug 2018 12:42)    PAST MEDICAL & SURGICAL HISTORY:  Lung nodules  COPD (Chronic Obstructive Pulmonary Disease): diagnose in ~2013  Colon Polyps  History of Osteoporosis  History of Cataract Surgery: left eye  Hip Replacement: bilateral            VITAL SIGNS:  Vital Signs Last 24 Hrs  T(C): 36.7 (04 Sep 2018 04:00), Max: 37.1 (04 Sep 2018 00:00)  T(F): 98.1 (04 Sep 2018 04:00), Max: 98.8 (04 Sep 2018 00:00)  HR: 97 (04 Sep 2018 05:00) (78 - 103)  BP: 157/67 (04 Sep 2018 05:00) (132/59 - 178/71)  BP(mean): 89 (04 Sep 2018 05:00) (75 - 113)  RR: 23 (04 Sep 2018 05:00) (21 - 28)  SpO2: 91% (04 Sep 2018 05:00) (90% - 100%)    I/Os:   I&O's Detail    02 Sep 2018 07:01  -  03 Sep 2018 07:00  --------------------------------------------------------  IN:    Enteral Tube Flush: 120 mL    IV PiggyBack: 850 mL    Sodium Chloride 0.9% IV Bolus: 250 mL    sodium chloride 0.9%.: 660 mL    Vivonex RTH: 670 mL  Total IN: 2550 mL    OUT:    Chest Tube: 50 mL    Indwelling Catheter - Urethral: 2164 mL  Total OUT: 2214 mL    Total NET: 336 mL      03 Sep 2018 07:01  -  04 Sep 2018 06:26  --------------------------------------------------------  IN:    Enteral Tube Flush: 130 mL    IV PiggyBack: 675 mL    sodium chloride 0.9%.: 690 mL    Vivonex RTH: 935 mL  Total IN: 2430 mL    OUT:    Chest Tube: 150 mL    Indwelling Catheter - Urethral: 1190 mL  Total OUT: 1340 mL    Total NET: 1090 mL          CAPILLARY BLOOD GLUCOSE          =======================MEDICATIONS===================  MEDICATIONS  (STANDING):  acetaminophen  IVPB. 1000 milliGRAM(s) IV Intermittent once  docusate sodium Liquid 100 milliGRAM(s) Oral three times a day  famotidine    Tablet 20 milliGRAM(s) Oral daily  fluconAZOLE IVPB      fluconAZOLE IVPB 200 milliGRAM(s) IV Intermittent every 24 hours  heparin  Injectable 5000 Unit(s) SubCutaneous every 8 hours  ipratropium    for Nebulization 500 MICROGram(s) Nebulizer every 6 hours  levalbuterol Inhalation 0.63 milliGRAM(s) Inhalation every 6 hours  meropenem  IVPB 1000 milliGRAM(s) IV Intermittent every 8 hours  metoprolol tartrate 25 milliGRAM(s) Oral every 8 hours  potassium chloride  10 mEq/100 mL IVPB 10 milliEquivalent(s) IV Intermittent every 1 hour  sodium chloride 0.9%. 1000 milliLiter(s) (30 mL/Hr) IV Continuous <Continuous>  vancomycin  IVPB 1500 milliGRAM(s) IV Intermittent every 24 hours    MEDICATIONS  (PRN):  melatonin 3 milliGRAM(s) Oral at bedtime PRN Sleep        PHYSICAL EXAM============================  General:                         Awake, but sleepy, NAD  Neuro:                            Moving all extremities to commands.   Respiratory:	Air entry fair and  bilateral conducted sounds                                           chest tube top water seal  CV:		Regular rate and rhythm. Normal S1/S2                                          Distal pulses present.  Abdomen:	                     Soft, non-distended. Bowel sounds present   Skin:		No rash.  Extremities:	Warm, no cyanosis or edema.  Palpable pulses    ============================LABS=========================                        11.8   10.17 )-----------( 356      ( 04 Sep 2018 03:20 )             35.0     09-04    138  |  97<L>  |  19  ----------------------------<  155<H>  3.5   |  31  |  0.40<L>    Ca    8.9      04 Sep 2018 03:20  Phos  2.5     09-03  Mg     1.6     09-03    TPro  5.3<L>  /  Alb  2.3<L>  /  TBili  0.4  /  DBili  x   /  AST  21  /  ALT  18  /  AlkPhos  106  09-04    LIVER FUNCTIONS - ( 04 Sep 2018 03:20 )  Alb: 2.3 g/dL / Pro: 5.3 g/dL / ALK PHOS: 106 u/L / ALT: 18 u/L / AST: 21 u/L / GGT: x                   ============================IMAGING STUDIES=========================  < from: Xray Chest 1 View- PORTABLE-Urgent (09.03.18 @ 10:28) >  AP view of the chest dated September 3, 2018 at 6:09 AM is submitted and   comparison is made with September 2, 2018. Enteric tube courses below the   left hemidiaphragm although the tip is not imaged. There is a right-sided   chest tube with previously described area of lucency overlying the right   apex unchanged.. There is a small right pleural effusion with right mid   to lower lung dense opacity or consolidation slightly progressed since   the prior study. Continued follow-up is recommended.    A follow-up chest x-ray demonstrates no definite evidenceof a   pneumothorax. A right-sided chest tube is again noted. Enteric tube   courses below the left hemidiaphragm although the tip is not imaged.   There is a small right pleural effusion with dense right mid to lower   lung consolidation predominantly unchanged.    83yFemale s/p RVATS /   Right upper lobectomy   08/27/2018 experiencing  pain with deep breathing. Postop period is significant for SVT / A-Fib. Pt had episodes of pauses on Cardizem EPS evaluated and recommended Lopressor 25mg bid & PPM if experiences pauses again.                            Neuro:                                         Pain control with  Tylenol only, avoid narcotics                            Cardiovascular:                                          Continue hemodynamic monitoring.    SVT / A-fb: Continue Lopressor 25mg q8hrs, increase as tolerated &  no more pauses     HTN:  Increase lopressor as tolerated                            Respiratory:                                              Pt is on 2L  nasal canula, S/P FB yesterday        Rt lower pneumonia: On Meropenem / Vanco / Diflucan     Atelectasis - Aggressive pulmonary toilet + Pulmozyme + Saline inhalations                                         mild SOB, not in any distress.                                         Using incentive spirometry 500cc                                         Monitor chest tube output - on water seal                                         Pneumothorax with small air leak.                                                               COPD: Continue  Xopenex / Atrovent nebulizer Rx.                             GI                                              Continue NGT feeds                                         Continue GI prophylaxis with  Protonix                                         Continue Zofran / Reglan for nausea - PRN	                                                                 Renal:                                         Continue LR 30cc/hr                                         Monitor I/Os and electrolytes                                                                                        Hem/ Onc:                                                                                  Monitor chest tube output &  signs of bleeding.                                          Follow CBC in AM                           Infectious disease:      Rt lower pneumonia: On Meropenem / Vanco / Diflucan        Monitor for fever / leukocytosis.                                         All surgical incision / chest tube  sites look clean                            Endocrine                                             Continue Accu-Checks with coverage    Pt is on SQ Heparin and Venodyne boots for DVT prophylaxis.     Pertinent clinical, laboratory, radiographic, hemodynamic, echocardiographic, respiratory data, microbiologic data and chart were reviewed and analyzed frequently throughout the course of the day and night  Patient seen, examined and plan discussed with CT Surgeon / CTICU team during rounds.    Pt's status discussed with family at bedside, updated status    I have spent  35  minutes of critical care time with this pt between  00am  and  8am            Santiago Garcia DO, FACEP

## 2018-09-04 NOTE — SWALLOW VFSS/MBS ASSESSMENT ADULT - RECOMMENDED CONSISTENCY
1.) NPO with Consideration for Short Term/Long Term Non Oral Means of Nutrition at Physician's discretion.  2.) Aspiration and Reflux Precautions  3.) Maintain Good Oral Hygiene Care

## 2018-09-05 ENCOUNTER — APPOINTMENT (OUTPATIENT)
Dept: THORACIC SURGERY | Facility: HOSPITAL | Age: 83
End: 2018-09-05
Payer: MEDICARE

## 2018-09-05 LAB
APTT BLD: 31.7 SEC — SIGNIFICANT CHANGE UP (ref 27.5–37.4)
BLD GP AB SCN SERPL QL: NEGATIVE — SIGNIFICANT CHANGE UP
BUN SERPL-MCNC: 13 MG/DL — SIGNIFICANT CHANGE UP (ref 7–23)
CALCIUM SERPL-MCNC: 9 MG/DL — SIGNIFICANT CHANGE UP (ref 8.4–10.5)
CHLORIDE SERPL-SCNC: 98 MMOL/L — SIGNIFICANT CHANGE UP (ref 98–107)
CO2 SERPL-SCNC: 27 MMOL/L — SIGNIFICANT CHANGE UP (ref 22–31)
CREAT SERPL-MCNC: 0.37 MG/DL — LOW (ref 0.5–1.3)
GLUCOSE SERPL-MCNC: 111 MG/DL — HIGH (ref 70–99)
HCT VFR BLD CALC: 35.4 % — SIGNIFICANT CHANGE UP (ref 34.5–45)
HGB BLD-MCNC: 11.8 G/DL — SIGNIFICANT CHANGE UP (ref 11.5–15.5)
INR BLD: 1.02 — SIGNIFICANT CHANGE UP (ref 0.88–1.17)
MAGNESIUM SERPL-MCNC: 1.8 MG/DL — SIGNIFICANT CHANGE UP (ref 1.6–2.6)
MCHC RBC-ENTMCNC: 30.1 PG — SIGNIFICANT CHANGE UP (ref 27–34)
MCHC RBC-ENTMCNC: 33.3 % — SIGNIFICANT CHANGE UP (ref 32–36)
MCV RBC AUTO: 90.3 FL — SIGNIFICANT CHANGE UP (ref 80–100)
NRBC # FLD: 0 — SIGNIFICANT CHANGE UP
PHOSPHATE SERPL-MCNC: 2.7 MG/DL — SIGNIFICANT CHANGE UP (ref 2.5–4.5)
PLATELET # BLD AUTO: 377 K/UL — SIGNIFICANT CHANGE UP (ref 150–400)
PMV BLD: 9.4 FL — SIGNIFICANT CHANGE UP (ref 7–13)
POTASSIUM SERPL-MCNC: 4 MMOL/L — SIGNIFICANT CHANGE UP (ref 3.5–5.3)
POTASSIUM SERPL-SCNC: 4 MMOL/L — SIGNIFICANT CHANGE UP (ref 3.5–5.3)
PROTHROM AB SERPL-ACNC: 11.7 SEC — SIGNIFICANT CHANGE UP (ref 9.8–13.1)
RBC # BLD: 3.92 M/UL — SIGNIFICANT CHANGE UP (ref 3.8–5.2)
RBC # FLD: 13.1 % — SIGNIFICANT CHANGE UP (ref 10.3–14.5)
RH IG SCN BLD-IMP: POSITIVE — SIGNIFICANT CHANGE UP
SODIUM SERPL-SCNC: 137 MMOL/L — SIGNIFICANT CHANGE UP (ref 135–145)
SPECIMEN SOURCE: SIGNIFICANT CHANGE UP
WBC # BLD: 11.34 K/UL — HIGH (ref 3.8–10.5)
WBC # FLD AUTO: 11.34 K/UL — HIGH (ref 3.8–10.5)

## 2018-09-05 PROCEDURE — 43246 EGD PLACE GASTROSTOMY TUBE: CPT | Mod: AS

## 2018-09-05 PROCEDURE — 43246 EGD PLACE GASTROSTOMY TUBE: CPT | Mod: 78

## 2018-09-05 PROCEDURE — 71045 X-RAY EXAM CHEST 1 VIEW: CPT | Mod: 26

## 2018-09-05 PROCEDURE — 99232 SBSQ HOSP IP/OBS MODERATE 35: CPT

## 2018-09-05 PROCEDURE — 31624 DX BRONCHOSCOPE/LAVAGE: CPT | Mod: 78

## 2018-09-05 PROCEDURE — 99291 CRITICAL CARE FIRST HOUR: CPT

## 2018-09-05 RX ORDER — DORNASE ALFA 1 MG/ML
2.5 SOLUTION RESPIRATORY (INHALATION) DAILY
Qty: 0 | Refills: 0 | Status: DISCONTINUED | OUTPATIENT
Start: 2018-09-05 | End: 2018-09-17

## 2018-09-05 RX ORDER — KETOROLAC TROMETHAMINE 30 MG/ML
15 SYRINGE (ML) INJECTION EVERY 6 HOURS
Qty: 0 | Refills: 0 | Status: DISCONTINUED | OUTPATIENT
Start: 2018-09-05 | End: 2018-09-09

## 2018-09-05 RX ORDER — METOPROLOL TARTRATE 50 MG
5 TABLET ORAL EVERY 6 HOURS
Qty: 0 | Refills: 0 | Status: COMPLETED | OUTPATIENT
Start: 2018-09-05 | End: 2018-09-06

## 2018-09-05 RX ORDER — FAMOTIDINE 10 MG/ML
20 INJECTION INTRAVENOUS ONCE
Qty: 0 | Refills: 0 | Status: COMPLETED | OUTPATIENT
Start: 2018-09-05 | End: 2018-09-08

## 2018-09-05 RX ORDER — ACETAMINOPHEN 500 MG
1000 TABLET ORAL ONCE
Qty: 0 | Refills: 0 | Status: DISCONTINUED | OUTPATIENT
Start: 2018-09-05 | End: 2018-09-09

## 2018-09-05 RX ORDER — ACETAMINOPHEN 500 MG
1000 TABLET ORAL ONCE
Qty: 0 | Refills: 0 | Status: COMPLETED | OUTPATIENT
Start: 2018-09-05 | End: 2018-09-05

## 2018-09-05 RX ORDER — DIPHENHYDRAMINE HCL 50 MG
25 CAPSULE ORAL ONCE
Qty: 0 | Refills: 0 | Status: DISCONTINUED | OUTPATIENT
Start: 2018-09-05 | End: 2018-09-09

## 2018-09-05 RX ADMIN — Medication 5 MILLIGRAM(S): at 14:16

## 2018-09-05 RX ADMIN — FLUCONAZOLE 100 MILLIGRAM(S): 150 TABLET ORAL at 12:01

## 2018-09-05 RX ADMIN — SODIUM CHLORIDE 50 MILLILITER(S): 9 INJECTION INTRAMUSCULAR; INTRAVENOUS; SUBCUTANEOUS at 22:17

## 2018-09-05 RX ADMIN — Medication 25 MILLIGRAM(S): at 05:33

## 2018-09-05 RX ADMIN — Medication 500 MICROGRAM(S): at 16:38

## 2018-09-05 RX ADMIN — HEPARIN SODIUM 5000 UNIT(S): 5000 INJECTION INTRAVENOUS; SUBCUTANEOUS at 05:33

## 2018-09-05 RX ADMIN — HEPARIN SODIUM 5000 UNIT(S): 5000 INJECTION INTRAVENOUS; SUBCUTANEOUS at 22:14

## 2018-09-05 RX ADMIN — LEVALBUTEROL 0.63 MILLIGRAM(S): 1.25 SOLUTION, CONCENTRATE RESPIRATORY (INHALATION) at 03:57

## 2018-09-05 RX ADMIN — DORNASE ALFA 2.5 MILLIGRAM(S): 1 SOLUTION RESPIRATORY (INHALATION) at 22:11

## 2018-09-05 RX ADMIN — Medication 500 MICROGRAM(S): at 03:56

## 2018-09-05 RX ADMIN — MEROPENEM 100 MILLIGRAM(S): 1 INJECTION INTRAVENOUS at 22:14

## 2018-09-05 RX ADMIN — Medication 15 MILLIGRAM(S): at 17:50

## 2018-09-05 RX ADMIN — Medication 15 MILLIGRAM(S): at 17:20

## 2018-09-05 RX ADMIN — Medication 500 MICROGRAM(S): at 22:13

## 2018-09-05 RX ADMIN — MEROPENEM 100 MILLIGRAM(S): 1 INJECTION INTRAVENOUS at 14:16

## 2018-09-05 RX ADMIN — Medication 100 MILLIGRAM(S): at 05:33

## 2018-09-05 RX ADMIN — Medication 400 MILLIGRAM(S): at 14:15

## 2018-09-05 RX ADMIN — MEROPENEM 100 MILLIGRAM(S): 1 INJECTION INTRAVENOUS at 05:33

## 2018-09-05 RX ADMIN — LEVALBUTEROL 0.63 MILLIGRAM(S): 1.25 SOLUTION, CONCENTRATE RESPIRATORY (INHALATION) at 16:38

## 2018-09-05 RX ADMIN — Medication 63.75 MILLIMOLE(S): at 17:20

## 2018-09-05 RX ADMIN — LEVALBUTEROL 0.63 MILLIGRAM(S): 1.25 SOLUTION, CONCENTRATE RESPIRATORY (INHALATION) at 22:09

## 2018-09-05 RX ADMIN — Medication 500 MICROGRAM(S): at 10:14

## 2018-09-05 RX ADMIN — Medication 1000 MILLIGRAM(S): at 14:45

## 2018-09-05 RX ADMIN — Medication 5 MILLIGRAM(S): at 22:14

## 2018-09-05 RX ADMIN — HEPARIN SODIUM 5000 UNIT(S): 5000 INJECTION INTRAVENOUS; SUBCUTANEOUS at 14:16

## 2018-09-05 NOTE — BRIEF OPERATIVE NOTE - OPERATION/FINDINGS
moderate amount of thick pulmonary secretions, airway inflammed
Flexible bronchoscopy with BAL.  Right VATS, Right upper lobectomy

## 2018-09-05 NOTE — PROGRESS NOTE ADULT - SUBJECTIVE AND OBJECTIVE BOX
Infectious Diseases progress note:    Subjective:  No new fevers.  WBC mildly elevated.      ROS:  CONSTITUTIONAL:  No fever, chills, rigors  CARDIOVASCULAR:  No chest pain or palpitations  RESPIRATORY:   No SOB, cough, dyspnea on exertion.  No wheezing  GASTROINTESTINAL:  No abd pain, N/V, diarrhea/constipation  EXTREMITIES:  No swelling or joint pain  GENITOURINARY:  No burning on urination, increased frequency or urgency.  No flank pain  NEUROLOGIC:  No HA, visual disturbances  SKIN: No rashes    Allergies    No Known Allergies    Intolerances        ANTIBIOTICS/RELEVANT:  antimicrobials  fluconAZOLE IVPB      fluconAZOLE IVPB 200 milliGRAM(s) IV Intermittent every 24 hours  meropenem  IVPB 1000 milliGRAM(s) IV Intermittent every 8 hours    immunologic:    OTHER:  acetaminophen  IVPB .. 750 milliGRAM(s) IV Intermittent once PRN  acetaminophen  IVPB .. 1000 milliGRAM(s) IV Intermittent once PRN  acetaminophen  IVPB. 1000 milliGRAM(s) IV Intermittent once  diphenhydrAMINE   Injectable 25 milliGRAM(s) IV Push once PRN  docusate sodium Liquid 100 milliGRAM(s) Oral three times a day  dornase selina Solution 2.5 milliGRAM(s) Inhalation daily  famotidine    Tablet 20 milliGRAM(s) Oral daily  famotidine Injectable 20 milliGRAM(s) IV Push once  heparin  Injectable 5000 Unit(s) SubCutaneous every 8 hours  ipratropium    for Nebulization 500 MICROGram(s) Nebulizer every 6 hours  ketorolac   Injectable 15 milliGRAM(s) IV Push every 6 hours PRN  levalbuterol Inhalation 0.63 milliGRAM(s) Inhalation every 6 hours  melatonin 3 milliGRAM(s) Oral at bedtime PRN  metoprolol tartrate 25 milliGRAM(s) Oral every 8 hours  metoprolol tartrate Injectable 5 milliGRAM(s) IV Push every 6 hours  sodium chloride 0.9%. 1000 milliLiter(s) IV Continuous <Continuous>      Objective:  Vital Signs Last 24 Hrs  T(C): 36.6 (05 Sep 2018 22:15), Max: 36.9 (05 Sep 2018 12:00)  T(F): 97.8 (05 Sep 2018 22:15), Max: 98.5 (05 Sep 2018 12:00)  HR: 97 (05 Sep 2018 22:13) (78 - 112)  BP: 150/66 (05 Sep 2018 22:00) (106/77 - 165/77)  BP(mean): 84 (05 Sep 2018 22:00) (75 - 98)  RR: 26 (05 Sep 2018 22:00) (21 - 33)  SpO2: 96% (05 Sep 2018 22:13) (92% - 100%)    PHYSICAL EXAM:  Constitutional:NAD  Eyes:ELE, EOMI  Ear/Nose/Throat: no thrush, mucositis.  Moist mucous membranes	  Neck:no JVD, no lymphadenopathy, supple  Respiratory: CTA lanette, chest tube  Cardiovascular: S1S2 RRR, no murmurs  Gastrointestinal:soft, nontender,  nondistended (+) BS  Extremities:no e/e/c  Skin:  no rashes, open wounds or ulcerations  :  soriano draining clear yellow urine        LABS:                        11.8   11.34 )-----------( 377      ( 05 Sep 2018 04:10 )             35.4     09-05    137  |  98  |  13  ----------------------------<  111<H>  4.0   |  27  |  0.37<L>    Ca    9.0      05 Sep 2018 04:10  Phos  2.7     09-05  Mg     1.8     09-05    TPro  5.3<L>  /  Alb  2.3<L>  /  TBili  0.4  /  DBili  x   /  AST  21  /  ALT  18  /  AlkPhos  106  09-04    PT/INR - ( 05 Sep 2018 04:10 )   PT: 11.7 SEC;   INR: 1.02          PTT - ( 05 Sep 2018 04:10 )  PTT:31.7 SEC            Vancomycin Level, Trough: 3.5 ug/mL (09-02 @ 13:10)              MICROBIOLOGY:    Culture - Blood (09.04.18 @ 11:42)    Culture - Blood:   NO ORGANISMS ISOLATED  NO ORGANISMS ISOLATED AT 24 HOURS    Specimen Source: BLOOD VENOUS    Culture - Respiratory with Gram Stain (08.31.18 @ 13:46)    -  Gentamicin: S <=1 JOANNE    -  Imipenem: S <=1 JOANNE    -  Levofloxacin: S <=1 JOANNE    -  Meropenem: S <=1 JOANNE    Culture - Respiratory:   Normal Respiratory Mallory Absent    -  Piperacillin/Tazobactam: S <=8 JOANNE    -  Tigecycline: S <=1 JOANNE    -  Tobramycin: S <=2 JOANNE    -  Trimethoprim/Sulfamethoxazole: S <=0.5/9.5 JOANNE    Gram Stain Sputum:   GPR Gram Positive Rods  QUANTITY OF BACTERIA SEEN: RARE (1+)  WBC^White Blood Cells  QNTY CELLS IN GRAM STAIN: MANY (4+)    -  Amikacin: S <=8 JOANNE    -  Ampicillin: R >16 JOANNE    -  Ampicillin/Sulbactam: R >16/8 JOANNE    -  Aztreonam: S <=4 JOANNE    -  Cefazolin: R >16 JOANNE    -  Cefepime: S <=2 JOANNE    -  Cefoxitin: R 16 JOANNE    -  Ceftazidime: S <=1 JOANNE    -  Ceftriaxone: S <=1 JOANNE    -  Ciprofloxacin: S <=0.5 JOANNE    -  Ertapenem: S <=0.5 JOANNE    Specimen Source: BRONCHIAL LAVAGE    Organism Identification: Serratia marcescens    Organism: Serratia marcescens  QUANTITY OF GROWTH: MODERATE    Method Type: NEGATIVE JOANNE 43          RADIOLOGY & ADDITIONAL STUDIES:    < from: Xray Chest 1 View- PORTABLE-Routine (09.05.18 @ 06:49) >      < end of copied text >  < from: Xray Chest 1 View- PORTABLE-Routine (09.05.18 @ 06:49) >      < end of copied text >

## 2018-09-05 NOTE — BRIEF OPERATIVE NOTE - PRE-OP DX
Lung cancer  09/05/2018  s/p lobectomy, inanition  Active  Silvia Salazar  Lung nodule  08/27/2018    Active  Myles Lambert

## 2018-09-05 NOTE — BRIEF OPERATIVE NOTE - PROCEDURE
<<-----Click on this checkbox to enter Procedure EGD  09/05/2018  PEG  Active  BERYL  Bronchoscopy  09/05/2018  BAL  Active  BERYL

## 2018-09-05 NOTE — BRIEF OPERATIVE NOTE - POST-OP DX
Lung cancer  09/05/2018  same as above  Active  Silvia Salazar  Lung nodule  08/27/2018    Active  Myles Lambert

## 2018-09-05 NOTE — CHART NOTE - NSCHARTNOTEFT_GEN_A_CORE
NUTRITION FOLLOW-UP:  Pt. s/p PEG placement.  TF on hold for 24hrs. Per discussion w RN and thoracic surgery fellow, there is concern for possible chylothorax which is being ruled out at this time.  Thus, given this concern, would resume Vivonex RTH  55mL/hr x 24hrs to meet estimated energy needs.  However, if chylothorax ruled out, would initiate Jevity 1.2 with goal of 55mL/hr x 24hrs (see initial dietitian evaluation).      Weight:  111.1lbs [50.4kg] on 9/5/18    Edema:   None noted.    Skin:  No noted pressure injuries.    Pertinent Medications: MEDICATIONS  (STANDING):  acetaminophen  IVPB. 1000 milliGRAM(s) IV Intermittent once  docusate sodium Liquid 100 milliGRAM(s) Oral three times a day  famotidine    Tablet 20 milliGRAM(s) Oral daily  fluconAZOLE IVPB      fluconAZOLE IVPB 200 milliGRAM(s) IV Intermittent every 24 hours  heparin  Injectable 5000 Unit(s) SubCutaneous every 8 hours  ipratropium    for Nebulization 500 MICROGram(s) Nebulizer every 6 hours  levalbuterol Inhalation 0.63 milliGRAM(s) Inhalation every 6 hours  meropenem  IVPB 1000 milliGRAM(s) IV Intermittent every 8 hours  metoprolol tartrate 25 milliGRAM(s) Oral every 8 hours  sodium chloride 0.9%. 1000 milliLiter(s) (50 mL/Hr) IV Continuous <Continuous>    MEDICATIONS  (PRN):  acetaminophen  IVPB .. 750 milliGRAM(s) IV Intermittent once PRN Temp greater or equal to 38C (100.4F), Moderate Pain (4 - 6)  melatonin 3 milliGRAM(s) Oral at bedtime PRN Sleep    Pertinent Labs:  09-05 Na137 mmol/L Glu 111 mg/dL<H> K+ 4.0 mmol/L Cr  0.37 mg/dL<L> BUN 13 mg/dL 09-05 Phos 2.7 mg/dL 09-04 Alb 2.3 g/dL<L>      Current Diet:  NPO p midnight            PLAN/RECOMMENDATIONS:    1) If Pt. deemed to have chylothorax--- resume Vivonex @ 55mL/hr x 24hrs [provides 1320Kcals & 66g protein daily]              vs      If Pt. does not have chylothorax--- initiate Jevity 1.2 with goal of 55mL/hr x 24hrs [1584Kcals & 73g protein daily]    2) Obtain daily weights  3) RDN remains available and will f/u PRN.          Christina Perla, CAMERON, CDN pager [phone]

## 2018-09-05 NOTE — PROGRESS NOTE ADULT - ASSESSMENT
83 y.o. female with hx of COPD, no significant cardiac history presenting with intraop- postop SVT s/p Right upper lobectomy.  Pt has been c/o fatigue, weight loss of 15 lbs in last 6 months, and reports occasional nonproductive cough.  CXR and subsequent CT scan, reports multiple right lung nodules.  Pt admitted  for lung resection.  Pt underwent lobectomy of right lung  with video-assisted thoracoscopic surgery  on 8/27/2018 and flexible bronchoscopy with bronchopulmonary lavage.  She has a right chest tube in place.      Post op course complicated by SVT and A.fib with pauses and pneumothorax.         Pt currently without fever.  WBC 14.1 on 8/28 --> 10.5.  Pt was given a dose of rocephin on 8/30 for possible pneumonia.  During bedside bronchoscopy pt noted to have copious murky secretions concerning for infection and oropharyngeal candidiases.  ID consult called for further antibiotic managment.     Problem/Plan - 1:    ·	Pna    - Pt s/p RUL lobectomy, s/p bronch/lavage, chest tube to suction.  Noted to have copious purulent secretions and oropharyngeal candidiasis.      - Recommend broad coverage, cont vanco/meropenem and diflucan.  Complete 2 weeks of diflucan to cover for possible esophageal candidiasis (through 9/12)    - Respiratory cultures sent from 8/30 and 8/31 - thus far growing Serratia marcesans.  Agree with d/c vancomycin    - blood cultures sent on 9/4 - ngtd    - Serial chest xrays.    - Chest tube management as per CTS.      Cont present abx, pt with continued pleural effusion and chest tube drainage.    Will follow,    Margareth Hodge  [phone]    d/w CT icu team

## 2018-09-05 NOTE — PROGRESS NOTE ADULT - SUBJECTIVE AND OBJECTIVE BOX
Remains on broad spectrum abx with excess secretions. No CP. No afib on tele.    MEDICATIONS:  heparin  Injectable 5000 Unit(s) SubCutaneous every 8 hours  metoprolol tartrate 25 milliGRAM(s) Oral every 8 hours  fluconAZOLE IVPB      fluconAZOLE IVPB 200 milliGRAM(s) IV Intermittent every 24 hours  meropenem  IVPB 1000 milliGRAM(s) IV Intermittent every 8 hours  ipratropium    for Nebulization 500 MICROGram(s) Nebulizer every 6 hours  levalbuterol Inhalation 0.63 milliGRAM(s) Inhalation every 6 hours  acetaminophen  IVPB .. 750 milliGRAM(s) IV Intermittent once PRN  acetaminophen  IVPB. 1000 milliGRAM(s) IV Intermittent once  melatonin 3 milliGRAM(s) Oral at bedtime PRN  docusate sodium Liquid 100 milliGRAM(s) Oral three times a day  famotidine    Tablet 20 milliGRAM(s) Oral daily  sodium chloride 0.9%. 1000 milliLiter(s) IV Continuous <Continuous>      PHYSICAL EXAM:  T(C): 36.5 (09-05-18 @ 08:00), Max: 36.8 (09-04-18 @ 16:00)  HR: 91 (09-05-18 @ 09:00) (82 - 108)  BP: 156/73 (09-05-18 @ 09:00) (134/64 - 164/73)  RR: 30 (09-05-18 @ 09:00) (21 - 32)  SpO2: 93% (09-05-18 @ 09:00) (90% - 99%)  Wt(kg): --  I&O's Summary    04 Sep 2018 07:01  -  05 Sep 2018 07:00  --------------------------------------------------------  IN: 1970 mL / OUT: 2535 mL / NET: -565 mL    05 Sep 2018 07:01  -  05 Sep 2018 09:34  --------------------------------------------------------  IN: 60 mL / OUT: 205 mL / NET: -145 mL      Appearance: Normal, fatigued  HEENT:   Normal oral mucosa, excess secretions  Cardiovascular: RRR, No edema  Respiratory: no accessory muscle usage  Psychiatry: A & O x 3, Mood & affect appropriate  Skin: No rashes, No ecchymoses, No cyanosis	  Neurologic: Non-focal      LABS:	 	    CBC Full  -  ( 05 Sep 2018 04:10 )  WBC Count : 11.34 K/uL  Hemoglobin : 11.8 g/dL  Hematocrit : 35.4 %  Platelet Count - Automated : 377 K/uL    09-05    137  |  98  |  13  ----------------------------<  111<H>  4.0   |  27  |  0.37<L>  09-04    138  |  97<L>  |  19  ----------------------------<  155<H>  3.5   |  31  |  0.40<L>    Ca    9.0      05 Sep 2018 04:10  Ca    8.9      04 Sep 2018 03:20  Phos  2.7     09-05  Mg     1.8     09-05    TPro  5.3<L>  /  Alb  2.3<L>  /  TBili  0.4  /  DBili  x   /  AST  21  /  ALT  18  /  AlkPhos  106  09-04      TELEMETRY: 	frequent PVCs, PACs, no afib    ASSESSMENT and PLAN:  Patient is an 84 yo F former smoker with COPD with recent REED and weight loss found to have RUL nodule concerning for malignancy now s/p RUL lobectomy, with subsequent development of recurrent SVT, found to be in afib/flutter intermittently.    Paroxsymal Afib/flutter  - no further afib/flutter noted, no pauses, but still with frequent PACs  - continue metop 25mg q8h  - continue to monitor on tele, if develops long/symptomatic pauses on metop, may need PPM  - chadsvasc 3 - pt needs to be on anticoagulation - please start when able  - keep K>4, Mg>2    Discussed with attending.     Prakash Corona MD  Cardiology Fellow

## 2018-09-05 NOTE — PROGRESS NOTE ADULT - SUBJECTIVE AND OBJECTIVE BOX
LIAM VELOZ            [id number]         No Known Allergies               83 y.o. female with hx of COPD, c/o SOB, dyspnea on exertion, fatigue, weight loss of 15 lbs in last 6 months, reports occasional nonproductive cough, s/p CXR, followed by CT scan, reports multiple right lung nodules noted admitted  for lung resection.      Procedure:  RVATS /   Right upper lobectomy   08/27/2018 08/30, 08/31, 09/01 Bronch       Issues:   Aspiration Pneumonia  Atelectasis  Shortness of breath / Bronchospasm / COPD exacerbation  SVT / a-FIB  right Pneumothorax  Air leak              Postop pain  HTN                 Home Medications:  Advair Diskus 250 mcg-50 mcg inhalation powder: 1 puff(s) inhaled 2 times a day (27 Aug 2018 12:42)  Spiriva 18 mcg inhalation capsule: 1 cap(s) inhaled once a day (27 Aug 2018 12:42)      PAST MEDICAL & SURGICAL HISTORY:  Lung nodules  COPD (Chronic Obstructive Pulmonary Disease): diagnose in ~2013  Colon Polyps  History of Osteoporosis  History of Cataract Surgery: left eye  Hip Replacement: bilateral        ICU Vital Signs Last 24 Hrs  T(C): 36.8 (05 Sep 2018 04:00), Max: 36.8 (04 Sep 2018 16:00)  T(F): 98.3 (05 Sep 2018 04:00), Max: 98.3 (04 Sep 2018 16:00)  HR: 96 (05 Sep 2018 05:00) (80 - 108)  BP: 145/67 (05 Sep 2018 05:00) (134/64 - 169/83)  BP(mean): 86 (05 Sep 2018 05:00) (79 - 102)  ABP: --  ABP(mean): --  RR: 23 (05 Sep 2018 05:00) (22 - 32)  SpO2: 98% (05 Sep 2018 05:00) (90% - 99%)    I&O's Detail    03 Sep 2018 07:01  -  04 Sep 2018 07:00  --------------------------------------------------------  IN:    Enteral Tube Flush: 130 mL    IV PiggyBack: 925 mL    sodium chloride 0.9%.: 720 mL    Vivonex RTH: 990 mL  Total IN: 2765 mL    OUT:    Chest Tube: 150 mL    Indwelling Catheter - Urethral: 1240 mL  Total OUT: 1390 mL    Total NET: 1375 mL      04 Sep 2018 07:01  -  05 Sep 2018 05:21  --------------------------------------------------------  IN:    Enteral Tube Flush: 180 mL    IV PiggyBack: 250 mL    sodium chloride 0.9%.: 690 mL    Vivonex RTH: 770 mL  Total IN: 1890 mL    OUT:    Chest Tube: 500 mL    Indwelling Catheter - Urethral: 1775 mL  Total OUT: 2275 mL    Total NET: -385 mL        CAPILLARY BLOOD GLUCOSE          Home Medications:  Advair Diskus 250 mcg-50 mcg inhalation powder: 1 puff(s) inhaled 2 times a day (27 Aug 2018 12:42)  Spiriva 18 mcg inhalation capsule: 1 cap(s) inhaled once a day (27 Aug 2018 12:42)      MEDICATIONS  (STANDING):  acetaminophen  IVPB. 1000 milliGRAM(s) IV Intermittent once  docusate sodium Liquid 100 milliGRAM(s) Oral three times a day  famotidine    Tablet 20 milliGRAM(s) Oral daily  fluconAZOLE IVPB      fluconAZOLE IVPB 200 milliGRAM(s) IV Intermittent every 24 hours  heparin  Injectable 5000 Unit(s) SubCutaneous every 8 hours  ipratropium    for Nebulization 500 MICROGram(s) Nebulizer every 6 hours  levalbuterol Inhalation 0.63 milliGRAM(s) Inhalation every 6 hours  meropenem  IVPB 1000 milliGRAM(s) IV Intermittent every 8 hours  metoprolol tartrate 25 milliGRAM(s) Oral every 8 hours  sodium chloride 0.9%. 1000 milliLiter(s) (30 mL/Hr) IV Continuous <Continuous>    MEDICATIONS  (PRN):  acetaminophen  IVPB .. 750 milliGRAM(s) IV Intermittent once PRN Temp greater or equal to 38C (100.4F), Moderate Pain (4 - 6)  melatonin 3 milliGRAM(s) Oral at bedtime PRN Sleep          Physical exam:     General:               Pt is awake, appears to be in mild shortness of breath but not in any distress                                                  Neuro:                  Nonfocal                             Cardiovascular:     S1 & S2, regular / irregular                          Respiratory:         Air entry is fair and equal on both sides, has bilateral rhonchi /  conducted sounds                           GI:                         Soft, nondistended and nontender, Bowel sounds active                            Ext:                        No cyanosis or edema                                  Labs:                                                                           11.8   11.34 )-----------( 377      ( 05 Sep 2018 04:10 )             35.4             09-05    137  |  98  |  13  ----------------------------<  111<H>  4.0   |  27  |  0.37<L>    Ca    9.0      05 Sep 2018 04:10  Phos  2.7     09-05  Mg     1.8     09-05    TPro  5.3<L>  /  Alb  2.3<L>  /  TBili  0.4  /  DBili  x   /  AST  21  /  ALT  18  /  AlkPhos  106  09-04                  PT/INR - ( 05 Sep 2018 04:10 )   PT: 11.7 SEC;   INR: 1.02          PTT - ( 05 Sep 2018 04:10 )  PTT:31.7 SEC  LIVER FUNCTIONS - ( 04 Sep 2018 03:20 )  Alb: 2.3 g/dL / Pro: 5.3 g/dL / ALK PHOS: 106 u/L / ALT: 18 u/L / AST: 21 u/L / GGT: x               CXR:    < from: Xray Chest 1 View- PORTABLE-Urgent (09.04.18 @ 10:58) >  Heart size and the mediastinum cannot be accurately evaluated on this   projection.  Enteric tube extends into the left hemiabdomen. Tip is not included on   the image. Right chest tube unchanged in position.  No pneumothorax seen.  Small right pleural effusion and right mid and lower lung opacity is not   significantly changed. Left lung remains clear. No left pleural effusion   is seen.          Plan:    General: 83yFemale s/p RVATS /   Right upper lobectomy   08/27/2018 experiencing  pain with deep breathing. Postop period is significant for SVT / A-Fib, atelectasis of right lung, pneumonia requiring bronchoscopy multiple times.                             Neuro:                                         Pain control with  Tylenol only, avoid narcotics                            Cardiovascular:                                          Continue hemodynamic monitoring.    SVT / A-fb: Continue Lopressor 25mg q8hrs, increase as tolerated &  no more pauses     HTN:  Increase lopressor as tolerated                            Respiratory:                                              Pt is on 2L  nasal canula,       Rt lower lobe Serratia pneumonia: On Meropenem  / Diflucan     Atelectasis - Aggressive pulmonary toilet + Pulmozyme + Saline inhalations                                         mild SOB, not in any distress.                                         Using incentive spirometry                                          Monitor chest tube output - on water seal                                                               COPD: Continue  Xopenex / Atrovent nebulizer Rx.                             GI                                         NPO  for PEG today     Failed Cine - for PEG today                                         Continue GI prophylaxis with  Protonix                                         Continue Zofran / Reglan for nausea - PRN	                                                                 Renal:                                         Continue LR 30cc/hr                                         Monitor I/Os and electrolytes                                                                                        Hem/ Onc:                                                                                  Monitor chest tube output &  signs of bleeding.                                          Follow CBC in AM                           Infectious disease:      Rt lower pneumonia: On Meropenem  / Diflucan        Monitor for fever / leukocytosis.                                         All surgical incision / chest tube  sites look clean                            Endocrine                                             Continue Accu-Checks with coverage    Pt is on SQ Heparin and Venodyne boots for DVT prophylaxis.     Pertinent clinical, laboratory, radiographic, hemodynamic, echocardiographic, respiratory data, microbiologic data and chart were reviewed and analyzed frequently throughout the course of the day and night  Patient seen, examined and plan discussed with CT Surgeon / CTICU team during rounds.    Pt's status discussed with family at bedside, updated status      I have spent  45   minutes of critical care time with this pt between  00am  and  8am              Michael Moya MD

## 2018-09-06 LAB
BACTERIA FLD CULT: SIGNIFICANT CHANGE UP
BUN SERPL-MCNC: 16 MG/DL — SIGNIFICANT CHANGE UP (ref 7–23)
CA-I BLD-SCNC: 1.1 MMOL/L — SIGNIFICANT CHANGE UP (ref 1.03–1.23)
CALCIUM SERPL-MCNC: 8.9 MG/DL — SIGNIFICANT CHANGE UP (ref 8.4–10.5)
CHLORIDE SERPL-SCNC: 98 MMOL/L — SIGNIFICANT CHANGE UP (ref 98–107)
CO2 SERPL-SCNC: 29 MMOL/L — SIGNIFICANT CHANGE UP (ref 22–31)
CREAT SERPL-MCNC: 0.41 MG/DL — LOW (ref 0.5–1.3)
GLUCOSE SERPL-MCNC: 109 MG/DL — HIGH (ref 70–99)
HCT VFR BLD CALC: 35.2 % — SIGNIFICANT CHANGE UP (ref 34.5–45)
HGB BLD-MCNC: 12 G/DL — SIGNIFICANT CHANGE UP (ref 11.5–15.5)
MAGNESIUM SERPL-MCNC: 1.8 MG/DL — SIGNIFICANT CHANGE UP (ref 1.6–2.6)
MCHC RBC-ENTMCNC: 30.8 PG — SIGNIFICANT CHANGE UP (ref 27–34)
MCHC RBC-ENTMCNC: 34.1 % — SIGNIFICANT CHANGE UP (ref 32–36)
MCV RBC AUTO: 90.3 FL — SIGNIFICANT CHANGE UP (ref 80–100)
NRBC # FLD: 0 — SIGNIFICANT CHANGE UP
PHOSPHATE SERPL-MCNC: 3.3 MG/DL — SIGNIFICANT CHANGE UP (ref 2.5–4.5)
PLATELET # BLD AUTO: 381 K/UL — SIGNIFICANT CHANGE UP (ref 150–400)
PMV BLD: 9.1 FL — SIGNIFICANT CHANGE UP (ref 7–13)
POTASSIUM SERPL-MCNC: 3.8 MMOL/L — SIGNIFICANT CHANGE UP (ref 3.5–5.3)
POTASSIUM SERPL-SCNC: 3.8 MMOL/L — SIGNIFICANT CHANGE UP (ref 3.5–5.3)
RBC # BLD: 3.9 M/UL — SIGNIFICANT CHANGE UP (ref 3.8–5.2)
RBC # FLD: 13.2 % — SIGNIFICANT CHANGE UP (ref 10.3–14.5)
SODIUM SERPL-SCNC: 139 MMOL/L — SIGNIFICANT CHANGE UP (ref 135–145)
WBC # BLD: 14.04 K/UL — HIGH (ref 3.8–10.5)
WBC # FLD AUTO: 14.04 K/UL — HIGH (ref 3.8–10.5)

## 2018-09-06 PROCEDURE — 99231 SBSQ HOSP IP/OBS SF/LOW 25: CPT

## 2018-09-06 PROCEDURE — 71045 X-RAY EXAM CHEST 1 VIEW: CPT | Mod: 26

## 2018-09-06 PROCEDURE — 99233 SBSQ HOSP IP/OBS HIGH 50: CPT

## 2018-09-06 RX ORDER — POTASSIUM CHLORIDE 20 MEQ
40 PACKET (EA) ORAL ONCE
Qty: 0 | Refills: 0 | Status: COMPLETED | OUTPATIENT
Start: 2018-09-06 | End: 2018-09-06

## 2018-09-06 RX ORDER — MAGNESIUM SULFATE 500 MG/ML
2 VIAL (ML) INJECTION ONCE
Qty: 0 | Refills: 0 | Status: COMPLETED | OUTPATIENT
Start: 2018-09-06 | End: 2018-09-06

## 2018-09-06 RX ORDER — FUROSEMIDE 40 MG
5 TABLET ORAL ONCE
Qty: 0 | Refills: 0 | Status: COMPLETED | OUTPATIENT
Start: 2018-09-06 | End: 2018-09-06

## 2018-09-06 RX ORDER — OCTREOTIDE ACETATE 200 UG/ML
60 INJECTION, SOLUTION INTRAVENOUS; SUBCUTANEOUS
Qty: 500 | Refills: 0 | Status: DISCONTINUED | OUTPATIENT
Start: 2018-09-06 | End: 2018-09-12

## 2018-09-06 RX ORDER — POTASSIUM CHLORIDE 20 MEQ
10 PACKET (EA) ORAL
Qty: 0 | Refills: 0 | Status: COMPLETED | OUTPATIENT
Start: 2018-09-06 | End: 2018-09-06

## 2018-09-06 RX ADMIN — MEROPENEM 100 MILLIGRAM(S): 1 INJECTION INTRAVENOUS at 14:09

## 2018-09-06 RX ADMIN — Medication 25 MILLIGRAM(S): at 22:50

## 2018-09-06 RX ADMIN — MEROPENEM 100 MILLIGRAM(S): 1 INJECTION INTRAVENOUS at 22:50

## 2018-09-06 RX ADMIN — Medication 100 MILLIGRAM(S): at 22:49

## 2018-09-06 RX ADMIN — Medication 5 MILLIGRAM(S): at 05:28

## 2018-09-06 RX ADMIN — Medication 15 MILLILITER(S): at 23:00

## 2018-09-06 RX ADMIN — Medication 25 MILLIGRAM(S): at 14:08

## 2018-09-06 RX ADMIN — Medication 5 MILLIGRAM(S): at 10:22

## 2018-09-06 RX ADMIN — HEPARIN SODIUM 5000 UNIT(S): 5000 INJECTION INTRAVENOUS; SUBCUTANEOUS at 14:09

## 2018-09-06 RX ADMIN — OCTREOTIDE ACETATE 12 MICROGRAM(S)/HR: 200 INJECTION, SOLUTION INTRAVENOUS; SUBCUTANEOUS at 11:41

## 2018-09-06 RX ADMIN — DORNASE ALFA 2.5 MILLIGRAM(S): 1 SOLUTION RESPIRATORY (INHALATION) at 10:09

## 2018-09-06 RX ADMIN — FAMOTIDINE 20 MILLIGRAM(S): 10 INJECTION INTRAVENOUS at 14:08

## 2018-09-06 RX ADMIN — Medication 40 MILLIEQUIVALENT(S): at 22:50

## 2018-09-06 RX ADMIN — Medication 100 MILLIEQUIVALENT(S): at 08:47

## 2018-09-06 RX ADMIN — Medication 5 MILLIGRAM(S): at 22:50

## 2018-09-06 RX ADMIN — Medication 500 MICROGRAM(S): at 04:12

## 2018-09-06 RX ADMIN — MEROPENEM 100 MILLIGRAM(S): 1 INJECTION INTRAVENOUS at 05:28

## 2018-09-06 RX ADMIN — HEPARIN SODIUM 5000 UNIT(S): 5000 INJECTION INTRAVENOUS; SUBCUTANEOUS at 22:49

## 2018-09-06 RX ADMIN — Medication 500 MICROGRAM(S): at 15:55

## 2018-09-06 RX ADMIN — Medication 15 MILLILITER(S): at 14:10

## 2018-09-06 RX ADMIN — HEPARIN SODIUM 5000 UNIT(S): 5000 INJECTION INTRAVENOUS; SUBCUTANEOUS at 05:28

## 2018-09-06 RX ADMIN — FLUCONAZOLE 100 MILLIGRAM(S): 150 TABLET ORAL at 12:57

## 2018-09-06 RX ADMIN — Medication 100 MILLIEQUIVALENT(S): at 06:08

## 2018-09-06 RX ADMIN — LEVALBUTEROL 0.63 MILLIGRAM(S): 1.25 SOLUTION, CONCENTRATE RESPIRATORY (INHALATION) at 15:55

## 2018-09-06 RX ADMIN — LEVALBUTEROL 0.63 MILLIGRAM(S): 1.25 SOLUTION, CONCENTRATE RESPIRATORY (INHALATION) at 10:07

## 2018-09-06 RX ADMIN — Medication 100 MILLIEQUIVALENT(S): at 11:51

## 2018-09-06 RX ADMIN — LEVALBUTEROL 0.63 MILLIGRAM(S): 1.25 SOLUTION, CONCENTRATE RESPIRATORY (INHALATION) at 04:10

## 2018-09-06 RX ADMIN — Medication 500 MICROGRAM(S): at 10:08

## 2018-09-06 RX ADMIN — Medication 50 GRAM(S): at 06:08

## 2018-09-06 NOTE — PROGRESS NOTE ADULT - ASSESSMENT
83 y.o. female with hx of COPD, no significant cardiac history presenting with intraop- postop SVT s/p Right upper lobectomy.  Pt has been c/o fatigue, weight loss of 15 lbs in last 6 months, and reports occasional nonproductive cough.  CXR and subsequent CT scan, reports multiple right lung nodules.  Pt admitted  for lung resection.  Pt underwent lobectomy of right lung  with video-assisted thoracoscopic surgery  on 8/27/2018 and flexible bronchoscopy with bronchopulmonary lavage.  She has a right chest tube in place.      Post op course complicated by SVT and A.fib with pauses and pneumothorax.         Pt currently without fever.  WBC 14.1 on 8/28 --> 10.5.  Pt was given a dose of rocephin on 8/30 for possible pneumonia.  During bedside bronchoscopy pt noted to have copious murky secretions concerning for infection and oropharyngeal candidiases.  ID consult called for further antibiotic managment.     Problem/Plan - 1:    ·	Pna    - Pt s/p RUL lobectomy, s/p bronch/lavage, chest tube to suction.  Noted to have copious purulent secretions and oropharyngeal candidiasis.      - Complete 2 weeks of diflucan to cover for possible esophageal candidiasis (through 9/12)    - Respiratory cultures sent from 8/30 and 8/31 - thus far growing Serratia marcesans. Pt on meropenem, will cont for now.    - blood cultures sent on 9/4 - ngtd    - Serial chest xrays.    - Chest tube management as per CTS.        No new ID recs at this time    Margareth Hodge  [phone]    d/w CT icu team

## 2018-09-06 NOTE — PROGRESS NOTE ADULT - SUBJECTIVE AND OBJECTIVE BOX
Pulmonary Consult Note    LIAM VELOZ  [id number]    Chief Complaint: Patient is a 83y old  Female who presents with a chief complaint of post op VATS    HPI:  states breathing is "the same", cough but no sputum production, ct still in place    MEDICATIONS  (STANDING):  acetaminophen  IVPB. 1000 milliGRAM(s) IV Intermittent once  docusate sodium Liquid 100 milliGRAM(s) Oral three times a day  dornase selina Solution 2.5 milliGRAM(s) Inhalation daily  famotidine    Tablet 20 milliGRAM(s) Oral daily  famotidine Injectable 20 milliGRAM(s) IV Push once  fluconAZOLE IVPB      fluconAZOLE IVPB 200 milliGRAM(s) IV Intermittent every 24 hours  heparin  Injectable 5000 Unit(s) SubCutaneous every 8 hours  ipratropium    for Nebulization 500 MICROGram(s) Nebulizer every 6 hours  levalbuterol Inhalation 0.63 milliGRAM(s) Inhalation every 6 hours  medium chain triglycerides Oil 15 milliLiter(s) Oral three times a day  meropenem  IVPB 1000 milliGRAM(s) IV Intermittent every 8 hours  metoprolol tartrate 25 milliGRAM(s) Oral every 8 hours  octreotide  Infusion 60 MICROgram(s)/Hr (12 mL/Hr) IV Continuous <Continuous>  sodium chloride 0.9%. 1000 milliLiter(s) (50 mL/Hr) IV Continuous <Continuous>    MEDICATIONS  (PRN):  acetaminophen  IVPB .. 750 milliGRAM(s) IV Intermittent once PRN Temp greater or equal to 38C (100.4F), Moderate Pain (4 - 6)  acetaminophen  IVPB .. 1000 milliGRAM(s) IV Intermittent once PRN Mild Pain (1 - 3), Moderate Pain (4 - 6)  diphenhydrAMINE   Injectable 25 milliGRAM(s) IV Push once PRN sleep  ketorolac   Injectable 15 milliGRAM(s) IV Push every 6 hours PRN Moderate Pain (4 - 6)  melatonin 3 milliGRAM(s) Oral at bedtime PRN Sleep             EXAM:  ICU Vital Signs Last 24 Hrs  T(C): 37 (06 Sep 2018 12:00), Max: 37.1 (06 Sep 2018 01:00)  T(F): 98.6 (06 Sep 2018 12:00), Max: 98.7 (06 Sep 2018 01:00)  HR: 99 (06 Sep 2018 11:00) (78 - 109)  BP: 150/67 (06 Sep 2018 11:00) (108/95 - 165/77)  BP(mean): 84 (06 Sep 2018 11:00) (71 - 98)  ABP: --  ABP(mean): --  RR: 25 (06 Sep 2018 11:00) (19 - 33)  SpO2: 97% (06 Sep 2018 11:00) (88% - 100%)      GENERAL: No acute distress  NEURO: Alert and oriented x 3      LABS:                        12.0   14.04 )-----------( 381      ( 06 Sep 2018 03:25 )             35.2   09-06    139  |  98  |  16  ----------------------------<  109<H>  3.8   |  29  |  0.41<L>    Ca    8.9      06 Sep 2018 03:25  Phos  3.3     09-06  Mg     1.8     09-06    IMAGING:    < from: Xray Chest 1 View- PORTABLE-Routine (09.06.18 @ 06:51) >  IMPRESSION:  Previous enteric tube no longer seen.    Right chest tube unchanged in position. No pneumothorax.    No significant change in small right pleural effusion or right lower lung   opacity which could be due to atelectasis and/or pneumonia in the   appropriate clinical context.    < end of copied text >          PROBLEM LIST:  83yFemale with HEALTH ISSUES - PROBLEM Dx:  Adeno Ca Lung  PTX  sepsis  pneumonia   Atelectatics with mucous plugging          RECS:  Pulmonary Toilet  Continued Antibx   CT per surgery  appreciate icu care  will need onc follow up       please feel free to call with any questions [phone]  Naomi Cleveland MD

## 2018-09-06 NOTE — PROGRESS NOTE ADULT - SUBJECTIVE AND OBJECTIVE BOX
83 F PMH  COPD, OP, cataracts, w cc of SOB  The pt developed SOB, REED, occasional nonproductive cough, fatigue, weight loss of 15 lbs in last 6 months. A CXR, followed by CT scan revealed multiple right lung nodules.    428941:  RVATS /   Right upper lobectomy   444232:  PEG/ FOB      Issues:   Atelectasis  Shortness of breath / Bronchospasm / COPD exacerbation  SVT / a-FIB  Pneumothorax  Air leak              Postop pain  HTN    Home Medications:  Advair Diskus 250 mcg-50 mcg inhalation powder: 1 puff(s) inhaled 2 times a day (27 Aug 2018 12:42)  Spiriva 18 mcg inhalation capsule: 1 cap(s) inhaled once a day (27 Aug 2018 12:42)    PAST MEDICAL & SURGICAL HISTORY:  Lung nodules  COPD (Chronic Obstructive Pulmonary Disease): diagnose in ~2013  Colon Polyps  History of Osteoporosis  History of Cataract Surgery: left eye  Hip Replacement: bilateral    MEDICATIONS  (STANDING):  acetaminophen  IVPB. 1000 milliGRAM(s) IV Intermittent once  docusate sodium Liquid 100 milliGRAM(s) Oral three times a day  dornase selina Solution 2.5 milliGRAM(s) Inhalation daily  famotidine    Tablet 20 milliGRAM(s) Oral daily  famotidine Injectable 20 milliGRAM(s) IV Push once  fluconAZOLE IVPB      fluconAZOLE IVPB 200 milliGRAM(s) IV Intermittent every 24 hours  heparin  Injectable 5000 Unit(s) SubCutaneous every 8 hours  ipratropium    for Nebulization 500 MICROGram(s) Nebulizer every 6 hours  levalbuterol Inhalation 0.63 milliGRAM(s) Inhalation every 6 hours  magnesium sulfate  IVPB 2 Gram(s) IV Intermittent once  meropenem  IVPB 1000 milliGRAM(s) IV Intermittent every 8 hours  metoprolol tartrate 25 milliGRAM(s) Oral every 8 hours  potassium chloride  10 mEq/100 mL IVPB 10 milliEquivalent(s) IV Intermittent every 1 hour  sodium chloride 0.9%. 1000 milliLiter(s) (50 mL/Hr) IV Continuous <Continuous>    MEDICATIONS  (PRN):  acetaminophen  IVPB .. 750 milliGRAM(s) IV Intermittent once PRN Temp greater or equal to 38C (100.4F), Moderate Pain (4 - 6)  acetaminophen  IVPB .. 1000 milliGRAM(s) IV Intermittent once PRN Mild Pain (1 - 3), Moderate Pain (4 - 6)  diphenhydrAMINE   Injectable 25 milliGRAM(s) IV Push once PRN sleep  ketorolac   Injectable 15 milliGRAM(s) IV Push every 6 hours PRN Moderate Pain (4 - 6)  melatonin 3 milliGRAM(s) Oral at bedtime PRN Sleep      ICU Vital Signs Last 24 Hrs  T(C): 36.7 (06 Sep 2018 04:00), Max: 37.1 (06 Sep 2018 01:00)  T(F): 98.1 (06 Sep 2018 04:00), Max: 98.7 (06 Sep 2018 01:00)  HR: 78 (06 Sep 2018 04:12) (78 - 112)  BP: 154/54 (06 Sep 2018 04:00) (106/77 - 165/77)  BP(mean): 78 (06 Sep 2018 04:00) (71 - 98)  RR: 23 (06 Sep 2018 04:00) (19 - 33)  SpO2: 100% (06 Sep 2018 04:12) (91% - 100%)      Physical exam:                                                   Gen:                  WD WN in NAD              Neuro:              Alert & Oriented X 3, no focal deficits                          Cardiovascular:   S1 & S2, regular                           Respiratory:        Good B/L Air entry is fair and decreased on both sides R>L, has bilateral conducted sounds / + rhonchi /rales                          GI:                      + PEG + bowel sounds, Soft, nondistended and nontender,                           Ext:                     No cyanosis or edema,         I&O's Summary    04 Sep 2018 07:01  -  05 Sep 2018 07:00  --------------------------------------------------------  IN: 1970 mL / OUT: 2535 mL / NET: -565 mL    05 Sep 2018 07:01  -  06 Sep 2018 05:44  --------------------------------------------------------  IN: 1170 mL / OUT: 1715 mL / NET: -545 mL    Labs:                                                                           12.0   14.04 )-----------( 381      ( 06 Sep 2018 03:25 )             35.2                            09-06    139  |  98  |  16  ----------------------------<  109<H>  3.8   |  29  |  0.41<L>    Ca    8.9      06 Sep 2018 03:25  Phos  3.3     09-06  Mg     1.8     09-06                           PT/INR - ( 05 Sep 2018 04:10 )   PT: 11.7 SEC;   INR: 1.02     PTT - ( 05 Sep 2018 04:10 )  PTT:31.7 SEC      MICROBIOLOGY:    Culture - Blood (09.04.18 @ 11:42)    Culture - Blood:   NO ORGANISMS ISOLATED  NO ORGANISMS ISOLATED AT 24 HOURS    Specimen Source: BLOOD VENOUS    Culture - Respiratory with Gram Stain (08.31.18 @ 13:46)    -  Gentamicin: S <=1 JOANNE    -  Imipenem: S <=1 JOANNE    -  Levofloxacin: S <=1 JOANNE    -  Meropenem: S <=1 JOANNE    Culture - Respiratory:   Normal Respiratory Mallory Absent    -  Piperacillin/Tazobactam: S <=8 JOANNE    -  Tigecycline: S <=1 JOANNE    -  Tobramycin: S <=2 JOANNE    -  Trimethoprim/Sulfamethoxazole: S <=0.5/9.5 JOANNE    Gram Stain Sputum:   GPR Gram Positive Rods  QUANTITY OF BACTERIA SEEN: RARE (1+)  WBC^White Blood Cells  QNTY CELLS IN GRAM STAIN: MANY (4+)    -  Amikacin: S <=8 JOANNE    -  Ampicillin: R >16 JOANNE    -  Ampicillin/Sulbactam: R >16/8 JOANNE    -  Aztreonam: S <=4 JOANNE    -  Cefazolin: R >16 JOANNE    -  Cefepime: S <=2 JOANNE    -  Cefoxitin: R 16 JOANNE    -  Ceftazidime: S <=1 JOANNE    -  Ceftriaxone: S <=1 JOANNE    -  Ciprofloxacin: S <=0.5 JOANNE    -  Ertapenem: S <=0.5 JOANNE    Specimen Source: BRONCHIAL LAVAGE    Organism Identification: Serratia marcescens    Organism: Serratia marcescens  QUANTITY OF GROWTH: MODERATE    Method Type: NEGATIVE JOANNE 43         CXR  [id number]  Right chest tube and enteric tube unchanged from the study of the day   before. Opacity at the right lung base consistent with effusion and   pneumonia/atelectasis slightly improved. Left lung and right upper lobe   are clear. Heart size is stable. No pneumothorax.  COMPARISON:  September 4  IMPRESSION:  Follow-up with right-sided pneumonia/atelectasis.    Plan:  83 F PMH  COPD, OP, cataracts, w cc of SOB  The pt developed SOB, REED, occasional nonproductive cough, fatigue, weight loss of 15 lbs in last 6 months. A CXR, followed by CT scan revealed multiple right lung nodules.  082718:  RVATS /   Right upper lobectomy    090518:  PEG/ FOB   Issues:   Atelectasis  Shortness of breath / Bronchospasm / COPD exacerbation  SVT / a-FIB  Pneumothorax  Air leak              Postop pain  HTN    Neuro:                                         Pain control                             Cardiovascular:                                          Continue hemodynamic monitoring.  SVT / A-fb: EPS evaluated and recommended Lopressor 25mg bid, increase as tolerated & PPM if experiences pauses again.   HTN: Increased lopressor to 25mg q8hrs                            Respiratory:  S/P FOB  Pt is onnasal canula,    AM CXR -  rt lower atelectasis – improved post FOB Continue aggressive chest PT / Pulmozyme / 3% saline inhalation / Xopenex and Atrovent nebs                                      mild SOB, not in any distress.                                      Using incentive spirometry 500cc                                      Monitor chest tube output              Monitor clinically and repeat  CXR  later                                                            COPD: Continue  Xopenex / Atrovent nebulizer Rx.                           GI                                         NPO w TFs -on hold for possible FOB                                         Continue GI prophylaxis with Protonix                                         Continue Zofran / Reglan for nausea - PRN	                                                                 Renal:                                         Continue LR 30cc/hr                                         Monitor I/Os and electrolytes                                                                                        Hem/ Onc:                                         SQH & SCDs for VTE prophylaxis                                         Monitor chest tube output &  signs of bleeding.                                          Follow CBCs                           Infectious disease:                                          No signs of infection. Monitor for fever / leukocytosis.                                          All surgical incision / chest tube sites look clean      ID Recommendation: cont vanco/meropenem and diflucan.  Complete 2 weeks of diflucan to cover for possible esophageal candidiasis (through 9/12)                            Endocrine                                           Continue Accu-Checks with coverage    All available pertinent clinical, laboratory, radiographic, hemodynamic, echocardiographic, respiratory data, microbiologic data and chart were reviewed and analyzed frequently. GI and DVT prophylaxis, glycemic control, head of bed elevation and skin care issues were addressed.  Patient seen, examined and plan discussed with CT Surgery / CTICU team during rounds.    I have spent 45  minutes of critical care time with this pt between  2359 am  and  0800 am    Napoleon Barron MD

## 2018-09-06 NOTE — PROGRESS NOTE ADULT - SUBJECTIVE AND OBJECTIVE BOX
Infectious Diseases progress note:    Subjective: Resting comfortably, NAD.  s/p PEG yesterday.  No new fevers.  WBC elevated    ROS:  CONSTITUTIONAL:  No fever, chills, rigors  CARDIOVASCULAR:  No chest pain or palpitations  RESPIRATORY:   No SOB, cough, dyspnea on exertion.  No wheezing  GASTROINTESTINAL:  No abd pain, N/V, diarrhea/constipation  EXTREMITIES:  No swelling or joint pain  GENITOURINARY:  No burning on urination, increased frequency or urgency.  No flank pain  NEUROLOGIC:  No HA, visual disturbances  SKIN: No rashes    Allergies    No Known Allergies    Intolerances        ANTIBIOTICS/RELEVANT:  antimicrobials  fluconAZOLE IVPB      fluconAZOLE IVPB 200 milliGRAM(s) IV Intermittent every 24 hours  meropenem  IVPB 1000 milliGRAM(s) IV Intermittent every 8 hours    immunologic:    OTHER:  acetaminophen  IVPB .. 750 milliGRAM(s) IV Intermittent once PRN  acetaminophen  IVPB .. 1000 milliGRAM(s) IV Intermittent once PRN  acetaminophen  IVPB. 1000 milliGRAM(s) IV Intermittent once  diphenhydrAMINE   Injectable 25 milliGRAM(s) IV Push once PRN  docusate sodium Liquid 100 milliGRAM(s) Oral three times a day  dornase selina Solution 2.5 milliGRAM(s) Inhalation daily  famotidine    Tablet 20 milliGRAM(s) Oral daily  famotidine Injectable 20 milliGRAM(s) IV Push once  furosemide   Injectable 5 milliGRAM(s) IV Push once  heparin  Injectable 5000 Unit(s) SubCutaneous every 8 hours  ipratropium    for Nebulization 500 MICROGram(s) Nebulizer every 6 hours  ketorolac   Injectable 15 milliGRAM(s) IV Push every 6 hours PRN  levalbuterol Inhalation 0.63 milliGRAM(s) Inhalation every 6 hours  medium chain triglycerides Oil 15 milliLiter(s) Oral three times a day  melatonin 3 milliGRAM(s) Oral at bedtime PRN  metoprolol tartrate 25 milliGRAM(s) Oral every 8 hours  octreotide  Infusion 60 MICROgram(s)/Hr IV Continuous <Continuous>  potassium chloride   Powder 40 milliEquivalent(s) Oral once      Objective:  Vital Signs Last 24 Hrs  T(C): 37.1 (06 Sep 2018 16:00), Max: 37.1 (06 Sep 2018 01:00)  T(F): 98.7 (06 Sep 2018 16:00), Max: 98.7 (06 Sep 2018 01:00)  HR: 80 (06 Sep 2018 18:00) (78 - 105)  BP: 120/60 (06 Sep 2018 18:00) (108/95 - 162/63)  BP(mean): 75 (06 Sep 2018 18:00) (71 - 110)  RR: 19 (06 Sep 2018 18:00) (19 - 33)  SpO2: 100% (06 Sep 2018 18:00) (88% - 100%)    PHYSICAL EXAM:  Constitutional:NAD  Eyes:ELE, EOMI  Ear/Nose/Throat: no thrush, mucositis.  Moist mucous membranes	  Neck:no JVD, no lymphadenopathy, supple  Respiratory: Rt chest tube  Cardiovascular: S1S2 RRR, no murmurs  Gastrointestinal:soft, nontender,  nondistended (+) BS, peg  Extremities:no e/e/c  Skin:  no rashes, open wounds or ulcerations        LABS:                        12.0   14.04 )-----------( 381      ( 06 Sep 2018 03:25 )             35.2     09-06    139  |  98  |  16  ----------------------------<  109<H>  3.8   |  29  |  0.41<L>    Ca    8.9      06 Sep 2018 03:25  Phos  3.3     09-06  Mg     1.8     09-06      PT/INR - ( 05 Sep 2018 04:10 )   PT: 11.7 SEC;   INR: 1.02          PTT - ( 05 Sep 2018 04:10 )  PTT:31.7 SEC            Vancomycin Level, Trough: 3.5 ug/mL (09-02 @ 13:10)              MICROBIOLOGY:    Culture - Blood (09.04.18 @ 11:42)    Culture - Blood:   NO ORGANISMS ISOLATED  NO ORGANISMS ISOLATED AT 48 HRS.    Specimen Source: BLOOD VENOUS          RADIOLOGY & ADDITIONAL STUDIES:    < from: Xray Chest 1 View- PORTABLE-Routine (09.06.18 @ 06:51) >  IMPRESSION:  Previous enteric tube no longer seen.    Right chest tube unchanged in position. No pneumothorax.    No significant change in small right pleural effusion or right lower lung   opacity which could be due to atelectasis and/or pneumonia in the   appropriate clinical context.    < end of copied text >

## 2018-09-07 LAB
BUN SERPL-MCNC: 23 MG/DL — SIGNIFICANT CHANGE UP (ref 7–23)
CALCIUM SERPL-MCNC: 9.1 MG/DL — SIGNIFICANT CHANGE UP (ref 8.4–10.5)
CHLORIDE SERPL-SCNC: 97 MMOL/L — LOW (ref 98–107)
CO2 SERPL-SCNC: 28 MMOL/L — SIGNIFICANT CHANGE UP (ref 22–31)
CREAT SERPL-MCNC: 0.45 MG/DL — LOW (ref 0.5–1.3)
GLUCOSE SERPL-MCNC: 174 MG/DL — HIGH (ref 70–99)
HCT VFR BLD CALC: 36.6 % — SIGNIFICANT CHANGE UP (ref 34.5–45)
HGB BLD-MCNC: 12.1 G/DL — SIGNIFICANT CHANGE UP (ref 11.5–15.5)
MAGNESIUM SERPL-MCNC: 2.2 MG/DL — SIGNIFICANT CHANGE UP (ref 1.6–2.6)
MCHC RBC-ENTMCNC: 30.6 PG — SIGNIFICANT CHANGE UP (ref 27–34)
MCHC RBC-ENTMCNC: 33.1 % — SIGNIFICANT CHANGE UP (ref 32–36)
MCV RBC AUTO: 92.7 FL — SIGNIFICANT CHANGE UP (ref 80–100)
NRBC # FLD: 0 — SIGNIFICANT CHANGE UP
PHOSPHATE SERPL-MCNC: 2.7 MG/DL — SIGNIFICANT CHANGE UP (ref 2.5–4.5)
PLATELET # BLD AUTO: 424 K/UL — HIGH (ref 150–400)
PMV BLD: 9.4 FL — SIGNIFICANT CHANGE UP (ref 7–13)
POTASSIUM SERPL-MCNC: 4.9 MMOL/L — SIGNIFICANT CHANGE UP (ref 3.5–5.3)
POTASSIUM SERPL-SCNC: 4.9 MMOL/L — SIGNIFICANT CHANGE UP (ref 3.5–5.3)
PREALB SERPL-MCNC: 13 MG/DL — LOW (ref 20–40)
RBC # BLD: 3.95 M/UL — SIGNIFICANT CHANGE UP (ref 3.8–5.2)
RBC # FLD: 13.3 % — SIGNIFICANT CHANGE UP (ref 10.3–14.5)
SODIUM SERPL-SCNC: 137 MMOL/L — SIGNIFICANT CHANGE UP (ref 135–145)
WBC # BLD: 11.67 K/UL — HIGH (ref 3.8–10.5)
WBC # FLD AUTO: 11.67 K/UL — HIGH (ref 3.8–10.5)

## 2018-09-07 PROCEDURE — 71045 X-RAY EXAM CHEST 1 VIEW: CPT | Mod: 26

## 2018-09-07 PROCEDURE — 99233 SBSQ HOSP IP/OBS HIGH 50: CPT

## 2018-09-07 RX ORDER — OXYCODONE HYDROCHLORIDE 5 MG/1
5 TABLET ORAL EVERY 4 HOURS
Qty: 0 | Refills: 0 | Status: DISCONTINUED | OUTPATIENT
Start: 2018-09-07 | End: 2018-09-11

## 2018-09-07 RX ORDER — ACETAMINOPHEN 500 MG
750 TABLET ORAL ONCE
Qty: 0 | Refills: 0 | Status: COMPLETED | OUTPATIENT
Start: 2018-09-07 | End: 2018-09-07

## 2018-09-07 RX ADMIN — OXYCODONE HYDROCHLORIDE 5 MILLIGRAM(S): 5 TABLET ORAL at 10:00

## 2018-09-07 RX ADMIN — Medication 100 MILLIGRAM(S): at 22:45

## 2018-09-07 RX ADMIN — MEROPENEM 100 MILLIGRAM(S): 1 INJECTION INTRAVENOUS at 14:24

## 2018-09-07 RX ADMIN — OCTREOTIDE ACETATE 12 MICROGRAM(S)/HR: 200 INJECTION, SOLUTION INTRAVENOUS; SUBCUTANEOUS at 06:39

## 2018-09-07 RX ADMIN — LEVALBUTEROL 0.63 MILLIGRAM(S): 1.25 SOLUTION, CONCENTRATE RESPIRATORY (INHALATION) at 22:15

## 2018-09-07 RX ADMIN — FLUCONAZOLE 100 MILLIGRAM(S): 150 TABLET ORAL at 12:55

## 2018-09-07 RX ADMIN — LEVALBUTEROL 0.63 MILLIGRAM(S): 1.25 SOLUTION, CONCENTRATE RESPIRATORY (INHALATION) at 16:27

## 2018-09-07 RX ADMIN — Medication 25 MILLIGRAM(S): at 06:40

## 2018-09-07 RX ADMIN — Medication 15 MILLILITER(S): at 06:44

## 2018-09-07 RX ADMIN — LEVALBUTEROL 0.63 MILLIGRAM(S): 1.25 SOLUTION, CONCENTRATE RESPIRATORY (INHALATION) at 05:00

## 2018-09-07 RX ADMIN — HEPARIN SODIUM 5000 UNIT(S): 5000 INJECTION INTRAVENOUS; SUBCUTANEOUS at 22:45

## 2018-09-07 RX ADMIN — Medication 500 MICROGRAM(S): at 22:15

## 2018-09-07 RX ADMIN — Medication 300 MILLIGRAM(S): at 13:30

## 2018-09-07 RX ADMIN — MEROPENEM 100 MILLIGRAM(S): 1 INJECTION INTRAVENOUS at 06:39

## 2018-09-07 RX ADMIN — Medication 500 MICROGRAM(S): at 10:41

## 2018-09-07 RX ADMIN — Medication 500 MICROGRAM(S): at 05:00

## 2018-09-07 RX ADMIN — Medication 500 MILLILITER(S): at 23:00

## 2018-09-07 RX ADMIN — LEVALBUTEROL 0.63 MILLIGRAM(S): 1.25 SOLUTION, CONCENTRATE RESPIRATORY (INHALATION) at 10:41

## 2018-09-07 RX ADMIN — OXYCODONE HYDROCHLORIDE 5 MILLIGRAM(S): 5 TABLET ORAL at 09:05

## 2018-09-07 RX ADMIN — Medication 750 MILLIGRAM(S): at 14:00

## 2018-09-07 RX ADMIN — Medication 500 MICROGRAM(S): at 16:27

## 2018-09-07 RX ADMIN — Medication 15 MILLILITER(S): at 13:02

## 2018-09-07 RX ADMIN — Medication 750 MILLIGRAM(S): at 00:45

## 2018-09-07 RX ADMIN — Medication 100 MILLIGRAM(S): at 13:01

## 2018-09-07 RX ADMIN — Medication 25 MILLIGRAM(S): at 22:46

## 2018-09-07 RX ADMIN — DORNASE ALFA 2.5 MILLIGRAM(S): 1 SOLUTION RESPIRATORY (INHALATION) at 10:42

## 2018-09-07 RX ADMIN — HEPARIN SODIUM 5000 UNIT(S): 5000 INJECTION INTRAVENOUS; SUBCUTANEOUS at 06:40

## 2018-09-07 RX ADMIN — Medication 3 MILLIGRAM(S): at 22:45

## 2018-09-07 RX ADMIN — OCTREOTIDE ACETATE 12 MICROGRAM(S)/HR: 200 INJECTION, SOLUTION INTRAVENOUS; SUBCUTANEOUS at 19:30

## 2018-09-07 RX ADMIN — Medication 25 MILLIGRAM(S): at 14:23

## 2018-09-07 RX ADMIN — HEPARIN SODIUM 5000 UNIT(S): 5000 INJECTION INTRAVENOUS; SUBCUTANEOUS at 14:23

## 2018-09-07 RX ADMIN — Medication 300 MILLIGRAM(S): at 00:30

## 2018-09-07 RX ADMIN — MEROPENEM 100 MILLIGRAM(S): 1 INJECTION INTRAVENOUS at 22:47

## 2018-09-07 RX ADMIN — FAMOTIDINE 20 MILLIGRAM(S): 10 INJECTION INTRAVENOUS at 12:55

## 2018-09-07 RX ADMIN — Medication 100 MILLIGRAM(S): at 06:39

## 2018-09-07 NOTE — PROGRESS NOTE ADULT - SUBJECTIVE AND OBJECTIVE BOX
LIAM VELOZ            [id number]         No Known Allergies               83 y.o. female with hx of COPD, c/o SOB, dyspnea on exertion, fatigue, weight loss of 15 lbs in last 6 months, reports occasional nonproductive cough, s/p CXR, followed by CT scan, reports multiple right lung nodules noted admitted  for lung resection.      Procedure:  RVATS /   Right upper lobectomy   08/27/2018 08/30, 08/31, 09/01 Bronch       Issues:   Aspiration Pneumonia  Atelectasis  Shortness of breath / Bronchospasm / COPD exacerbation  SVT / a-FIB              Postop pain  HTN                 Home Medications:  Advair Diskus 250 mcg-50 mcg inhalation powder: 1 puff(s) inhaled 2 times a day (27 Aug 2018 12:42)  Spiriva 18 mcg inhalation capsule: 1 cap(s) inhaled once a day (27 Aug 2018 12:42)      PAST MEDICAL & SURGICAL HISTORY:  Lung nodules  COPD (Chronic Obstructive Pulmonary Disease): diagnose in ~2013  Colon Polyps  History of Osteoporosis  History of Cataract Surgery: left eye  Hip Replacement: bilateral        ICU Vital Signs Last 24 Hrs  T(C): 36.5 (07 Sep 2018 00:00), Max: 37.1 (06 Sep 2018 16:00)  T(F): 97.7 (07 Sep 2018 00:00), Max: 98.7 (06 Sep 2018 16:00)  HR: 76 (07 Sep 2018 05:00) (64 - 104)  BP: 151/51 (07 Sep 2018 03:00) (117/67 - 162/63)  BP(mean): 79 (07 Sep 2018 03:00) (72 - 110)  ABP: --  ABP(mean): --  RR: 25 (07 Sep 2018 03:00) (19 - 29)  SpO2: 95% (07 Sep 2018 05:00) (90% - 100%)    I&O's Detail    05 Sep 2018 07:01  -  06 Sep 2018 07:00  --------------------------------------------------------  IN:    IV PiggyBack: 550 mL    sodium chloride 0.9%: 970 mL  Total IN: 1520 mL    OUT:    Chest Tube: 255 mL    Indwelling Catheter - Urethral: 1365 mL    PEG (Percutaneous Endoscopic Gastrostomy) Tube: 210 mL  Total OUT: 1830 mL    Total NET: -310 mL      06 Sep 2018 07:01  -  07 Sep 2018 05:33  --------------------------------------------------------  IN:    Enteral Tube Flush: 150 mL    IV PiggyBack: 500 mL    octreotide  Infusion: 204 mL    sodium chloride 0.9%: 250 mL    Vivonex RTH: 460 mL  Total IN: 1564 mL    OUT:    Chest Tube: 90 mL    Indwelling Catheter - Urethral: 2245 mL    PEG (Percutaneous Endoscopic Gastrostomy) Tube: 155 mL  Total OUT: 2490 mL    Total NET: -926 mL        CAPILLARY BLOOD GLUCOSE          Home Medications:  Advair Diskus 250 mcg-50 mcg inhalation powder: 1 puff(s) inhaled 2 times a day (27 Aug 2018 12:42)  Spiriva 18 mcg inhalation capsule: 1 cap(s) inhaled once a day (27 Aug 2018 12:42)      MEDICATIONS  (STANDING):  acetaminophen  IVPB. 1000 milliGRAM(s) IV Intermittent once  docusate sodium Liquid 100 milliGRAM(s) Oral three times a day  dornase selina Solution 2.5 milliGRAM(s) Inhalation daily  famotidine    Tablet 20 milliGRAM(s) Oral daily  famotidine Injectable 20 milliGRAM(s) IV Push once  fluconAZOLE IVPB      fluconAZOLE IVPB 200 milliGRAM(s) IV Intermittent every 24 hours  heparin  Injectable 5000 Unit(s) SubCutaneous every 8 hours  ipratropium    for Nebulization 500 MICROGram(s) Nebulizer every 6 hours  levalbuterol Inhalation 0.63 milliGRAM(s) Inhalation every 6 hours  medium chain triglycerides Oil 15 milliLiter(s) Oral three times a day  meropenem  IVPB 1000 milliGRAM(s) IV Intermittent every 8 hours  metoprolol tartrate 25 milliGRAM(s) Oral every 8 hours  octreotide  Infusion 60 MICROgram(s)/Hr (12 mL/Hr) IV Continuous <Continuous>    MEDICATIONS  (PRN):  acetaminophen  IVPB .. 750 milliGRAM(s) IV Intermittent once PRN Temp greater or equal to 38C (100.4F), Moderate Pain (4 - 6)  acetaminophen  IVPB .. 1000 milliGRAM(s) IV Intermittent once PRN Mild Pain (1 - 3), Moderate Pain (4 - 6)  diphenhydrAMINE   Injectable 25 milliGRAM(s) IV Push once PRN sleep  ketorolac   Injectable 15 milliGRAM(s) IV Push every 6 hours PRN Moderate Pain (4 - 6)  melatonin 3 milliGRAM(s) Oral at bedtime PRN Sleep          Physical exam:     General:               Pt is awake, appears to be fairly comfortable and  not in any distress                                                  Neuro:                  Nonfocal                             Cardiovascular:     S1 & S2, regular / irregular                          Respiratory:         Air entry is fair and equal on both sides, has bilateral rhonchi /  conducted sounds                           GI:                         Soft, nondistended and nontender, Bowel sounds active                            Ext:                        No cyanosis or edema                               Labs:                                                                           12.1   11.67 )-----------( 424      ( 07 Sep 2018 04:50 )             36.6             09-07    137  |  97<L>  |  23  ----------------------------<  174<H>  4.9   |  28  |  0.45<L>    Ca    9.1      07 Sep 2018 04:50  Phos  2.7     09-07  Mg     2.2     09-07          CXR:      Plan:    General: 83yFemale s/p RVATS /   Right upper lobectomy   08/27/2018 experiencing  pain with deep breathing. Postop period is significant for SVT / A-Fib, atelectasis of right lung, pneumonia requiring bronchoscopy multiple times.                             Neuro:                                         Pain control with  Tylenol only, avoid narcotics                            Cardiovascular:                                          Continue hemodynamic monitoring.    SVT / A-fb: Continue Lopressor 25mg q8hrs, increase as tolerated &  no more pauses     HTN:  Increase lopressor as tolerated                            Respiratory:                                              Pt is on 2L  nasal canula,       Rt lower lobe Serratia pneumonia: On Meropenem  / Diflucan     Atelectasis - Aggressive pulmonary toilet + Pulmozyme + Saline inhalations                                         mild SOB, not in any distress.                                         Using incentive spirometry                                          Chest tube d/cd                                                           COPD: Continue  Xopenex / Atrovent nebulizer Rx.                             GI                                         Chylothorax ?  - On Vivenex + MCT oil. Currently on Octreotide drip     NPO  with tube feeds                                         Continue GI prophylaxis with  Protonix                                         Continue Zofran / Reglan for nausea - PRN	                                                                 Renal:                                         Gentle diuresis                                         Monitor I/Os and electrolytes                                                                                        Hem/ Onc:                                                                                  Monitor for signs of bleeding.                                          Follow CBC in AM                           Infectious disease:      Rt lower pneumonia: On Meropenem  / Diflucan. WBC coming down       Monitor for fever / leukocytosis.                                         All surgical incision / chest tube  sites look clean                            Endocrine                                             Continue Accu-Checks with coverage    Pt is on SQ Heparin and Venodyne boots for DVT prophylaxis.     Pertinent clinical, laboratory, radiographic, hemodynamic, echocardiographic, respiratory data, microbiologic data and chart were reviewed and analyzed frequently throughout the course of the day and night  Patient seen, examined and plan discussed with CT Surgeon Dr. Gallegos / CTICU team during rounds.    Pt's status discussed with family at bedside, updated status     is aware of need for rehab placement next week              Michael Moya MD

## 2018-09-07 NOTE — CHART NOTE - NSCHARTNOTEFT_GEN_A_CORE
Reviewed telemetry - Remains in Sinus rhythm   No indication for device therapy at the present time.   Recommend Anticoagulation when cleared from surgical stand point given her history of PAF and CHADS VaSc score of 2 ( Age / gender).   Recommend Eliquis 2.5 mg bid     Thank you.     Amber Lange,FNP-C  [id number] Ms. Hinton is a pleasant 82 yo F former smoker with COPD with recent REED and weight loss found to have RUL nodule concerning for malignancy now s/p RUL lobectomy, with subsequent development of recurrent SVT, found to be in afib/flutter post op.     Reviewed telemetry - Remains in Sinus rhythm   No indication for device therapy at the present time.   Recommend Anticoagulation when cleared from surgical stand point given her history of PAF and CHADS VaSc score of 2 ( Age / gender).   Recommend Eliquis 2.5 mg bid   Continue Metop 25 Q8H    Thank you.     Amber Lange,FNP-C  [id number]

## 2018-09-08 LAB
ALBUMIN SERPL ELPH-MCNC: 2.5 G/DL — LOW (ref 3.3–5)
ALP SERPL-CCNC: 88 U/L — SIGNIFICANT CHANGE UP (ref 40–120)
ALT FLD-CCNC: 14 U/L — SIGNIFICANT CHANGE UP (ref 4–33)
AST SERPL-CCNC: 15 U/L — SIGNIFICANT CHANGE UP (ref 4–32)
BASOPHILS # BLD AUTO: 0.06 K/UL — SIGNIFICANT CHANGE UP (ref 0–0.2)
BASOPHILS NFR BLD AUTO: 0.4 % — SIGNIFICANT CHANGE UP (ref 0–2)
BILIRUB SERPL-MCNC: 0.2 MG/DL — SIGNIFICANT CHANGE UP (ref 0.2–1.2)
BUN SERPL-MCNC: 20 MG/DL — SIGNIFICANT CHANGE UP (ref 7–23)
CALCIUM SERPL-MCNC: 9 MG/DL — SIGNIFICANT CHANGE UP (ref 8.4–10.5)
CHLORIDE SERPL-SCNC: 97 MMOL/L — LOW (ref 98–107)
CO2 SERPL-SCNC: 34 MMOL/L — HIGH (ref 22–31)
CREAT SERPL-MCNC: 0.38 MG/DL — LOW (ref 0.5–1.3)
EOSINOPHIL # BLD AUTO: 0.5 K/UL — SIGNIFICANT CHANGE UP (ref 0–0.5)
EOSINOPHIL NFR BLD AUTO: 3.7 % — SIGNIFICANT CHANGE UP (ref 0–6)
GLUCOSE SERPL-MCNC: 162 MG/DL — HIGH (ref 70–99)
HCT VFR BLD CALC: 33.2 % — LOW (ref 34.5–45)
HGB BLD-MCNC: 11 G/DL — LOW (ref 11.5–15.5)
IMM GRANULOCYTES # BLD AUTO: 0.17 # — SIGNIFICANT CHANGE UP
IMM GRANULOCYTES NFR BLD AUTO: 1.3 % — SIGNIFICANT CHANGE UP (ref 0–1.5)
LYMPHOCYTES # BLD AUTO: 0.74 K/UL — LOW (ref 1–3.3)
LYMPHOCYTES # BLD AUTO: 5.5 % — LOW (ref 13–44)
MCHC RBC-ENTMCNC: 30.8 PG — SIGNIFICANT CHANGE UP (ref 27–34)
MCHC RBC-ENTMCNC: 33.1 % — SIGNIFICANT CHANGE UP (ref 32–36)
MCV RBC AUTO: 93 FL — SIGNIFICANT CHANGE UP (ref 80–100)
MONOCYTES # BLD AUTO: 1.08 K/UL — HIGH (ref 0–0.9)
MONOCYTES NFR BLD AUTO: 8 % — SIGNIFICANT CHANGE UP (ref 2–14)
NEUTROPHILS # BLD AUTO: 10.93 K/UL — HIGH (ref 1.8–7.4)
NEUTROPHILS NFR BLD AUTO: 81.1 % — HIGH (ref 43–77)
NRBC # FLD: 0 — SIGNIFICANT CHANGE UP
PLATELET # BLD AUTO: 432 K/UL — HIGH (ref 150–400)
PMV BLD: 9.8 FL — SIGNIFICANT CHANGE UP (ref 7–13)
POTASSIUM SERPL-MCNC: 4.1 MMOL/L — SIGNIFICANT CHANGE UP (ref 3.5–5.3)
POTASSIUM SERPL-SCNC: 4.1 MMOL/L — SIGNIFICANT CHANGE UP (ref 3.5–5.3)
PROT SERPL-MCNC: 5.7 G/DL — LOW (ref 6–8.3)
RBC # BLD: 3.57 M/UL — LOW (ref 3.8–5.2)
RBC # FLD: 13.2 % — SIGNIFICANT CHANGE UP (ref 10.3–14.5)
SODIUM SERPL-SCNC: 139 MMOL/L — SIGNIFICANT CHANGE UP (ref 135–145)
WBC # BLD: 13.48 K/UL — HIGH (ref 3.8–10.5)
WBC # FLD AUTO: 13.48 K/UL — HIGH (ref 3.8–10.5)

## 2018-09-08 PROCEDURE — 99233 SBSQ HOSP IP/OBS HIGH 50: CPT

## 2018-09-08 PROCEDURE — 71045 X-RAY EXAM CHEST 1 VIEW: CPT | Mod: 26

## 2018-09-08 RX ORDER — ALBUMIN HUMAN 25 %
250 VIAL (ML) INTRAVENOUS ONCE
Qty: 0 | Refills: 0 | Status: COMPLETED | OUTPATIENT
Start: 2018-09-08 | End: 2018-09-07

## 2018-09-08 RX ORDER — ACETAZOLAMIDE 250 MG/1
250 TABLET ORAL ONCE
Qty: 0 | Refills: 0 | Status: COMPLETED | OUTPATIENT
Start: 2018-09-08 | End: 2018-09-08

## 2018-09-08 RX ORDER — FUROSEMIDE 40 MG
5 TABLET ORAL ONCE
Qty: 0 | Refills: 0 | Status: DISCONTINUED | OUTPATIENT
Start: 2018-09-08 | End: 2018-09-08

## 2018-09-08 RX ORDER — APIXABAN 2.5 MG/1
2.5 TABLET, FILM COATED ORAL EVERY 12 HOURS
Qty: 0 | Refills: 0 | Status: DISCONTINUED | OUTPATIENT
Start: 2018-09-08 | End: 2018-09-17

## 2018-09-08 RX ADMIN — Medication 500 MICROGRAM(S): at 03:45

## 2018-09-08 RX ADMIN — Medication 15 MILLILITER(S): at 11:56

## 2018-09-08 RX ADMIN — Medication 100 MILLIGRAM(S): at 21:31

## 2018-09-08 RX ADMIN — LEVALBUTEROL 0.63 MILLIGRAM(S): 1.25 SOLUTION, CONCENTRATE RESPIRATORY (INHALATION) at 03:45

## 2018-09-08 RX ADMIN — Medication 500 MICROGRAM(S): at 22:23

## 2018-09-08 RX ADMIN — FAMOTIDINE 20 MILLIGRAM(S): 10 INJECTION INTRAVENOUS at 06:34

## 2018-09-08 RX ADMIN — Medication 100 MILLIGRAM(S): at 05:28

## 2018-09-08 RX ADMIN — OXYCODONE HYDROCHLORIDE 5 MILLIGRAM(S): 5 TABLET ORAL at 06:34

## 2018-09-08 RX ADMIN — Medication 25 MILLIGRAM(S): at 05:28

## 2018-09-08 RX ADMIN — HEPARIN SODIUM 5000 UNIT(S): 5000 INJECTION INTRAVENOUS; SUBCUTANEOUS at 21:32

## 2018-09-08 RX ADMIN — ACETAZOLAMIDE 250 MILLIGRAM(S): 250 TABLET ORAL at 09:55

## 2018-09-08 RX ADMIN — LEVALBUTEROL 0.63 MILLIGRAM(S): 1.25 SOLUTION, CONCENTRATE RESPIRATORY (INHALATION) at 15:26

## 2018-09-08 RX ADMIN — LEVALBUTEROL 0.63 MILLIGRAM(S): 1.25 SOLUTION, CONCENTRATE RESPIRATORY (INHALATION) at 22:24

## 2018-09-08 RX ADMIN — Medication 500 MICROGRAM(S): at 15:26

## 2018-09-08 RX ADMIN — OCTREOTIDE ACETATE 12 MICROGRAM(S)/HR: 200 INJECTION, SOLUTION INTRAVENOUS; SUBCUTANEOUS at 19:22

## 2018-09-08 RX ADMIN — OXYCODONE HYDROCHLORIDE 5 MILLIGRAM(S): 5 TABLET ORAL at 07:00

## 2018-09-08 RX ADMIN — HEPARIN SODIUM 5000 UNIT(S): 5000 INJECTION INTRAVENOUS; SUBCUTANEOUS at 05:29

## 2018-09-08 RX ADMIN — DORNASE ALFA 2.5 MILLIGRAM(S): 1 SOLUTION RESPIRATORY (INHALATION) at 15:26

## 2018-09-08 RX ADMIN — Medication 25 MILLIGRAM(S): at 21:32

## 2018-09-08 RX ADMIN — APIXABAN 2.5 MILLIGRAM(S): 2.5 TABLET, FILM COATED ORAL at 21:32

## 2018-09-08 RX ADMIN — FLUCONAZOLE 100 MILLIGRAM(S): 150 TABLET ORAL at 11:55

## 2018-09-08 RX ADMIN — Medication 15 MILLILITER(S): at 14:49

## 2018-09-08 RX ADMIN — Medication 25 MILLIGRAM(S): at 14:48

## 2018-09-08 RX ADMIN — MEROPENEM 100 MILLIGRAM(S): 1 INJECTION INTRAVENOUS at 14:48

## 2018-09-08 RX ADMIN — HEPARIN SODIUM 5000 UNIT(S): 5000 INJECTION INTRAVENOUS; SUBCUTANEOUS at 14:48

## 2018-09-08 RX ADMIN — Medication 100 MILLIGRAM(S): at 14:48

## 2018-09-08 RX ADMIN — MEROPENEM 100 MILLIGRAM(S): 1 INJECTION INTRAVENOUS at 05:29

## 2018-09-08 RX ADMIN — Medication 15 MILLILITER(S): at 21:33

## 2018-09-08 RX ADMIN — APIXABAN 2.5 MILLIGRAM(S): 2.5 TABLET, FILM COATED ORAL at 11:54

## 2018-09-08 RX ADMIN — MEROPENEM 100 MILLIGRAM(S): 1 INJECTION INTRAVENOUS at 21:31

## 2018-09-08 RX ADMIN — FAMOTIDINE 20 MILLIGRAM(S): 10 INJECTION INTRAVENOUS at 11:55

## 2018-09-08 RX ADMIN — Medication 3 MILLIGRAM(S): at 21:32

## 2018-09-08 NOTE — PROGRESS NOTE ADULT - SUBJECTIVE AND OBJECTIVE BOX
LIAM VELOZ                     [id number]    HPI:  83 y.o. female with hx of COPD, c/o SOB, dyspnea on exertion, fatigue, weight loss of 15 lbs in last 6 months, reports occasional nonproductive cough, s/p CXR, followed by CT scan, reports multiple right lung nodules noted admitted  for lung resection.      Procedure:  RVATS /   Right upper lobectomy   08/27/2018 08/30, 08/31, 09/01 Bronch       Issues:   Aspiration Pneumonia  Atelectasis  Shortness of breath / Bronchospasm / COPD exacerbation  SVT / a-FIB              Postop pain  HTN                 Home Medications:  Advair Diskus 250 mcg-50 mcg inhalation powder: 1 puff(s) inhaled 2 times a day (27 Aug 2018 12:42)  Spiriva 18 mcg inhalation capsule: 1 cap(s) inhaled once a day (27 Aug 2018 12:42)      PAST MEDICAL & SURGICAL HISTORY:  Lung nodules  COPD (Chronic Obstructive Pulmonary Disease): diagnose in ~2013  Colon Polyps  History of Osteoporosis  History of Cataract Surgery: left eye  Hip Replacement: bilateral            VITAL SIGNS:  Vital Signs Last 24 Hrs  T(C): 36.6 (08 Sep 2018 04:00), Max: 36.9 (07 Sep 2018 08:00)  T(F): 97.8 (08 Sep 2018 04:00), Max: 98.5 (07 Sep 2018 20:00)  HR: 80 (08 Sep 2018 06:00) (65 - 98)  BP: 126/61 (08 Sep 2018 06:00) (113/61 - 161/61)  BP(mean): 67 (08 Sep 2018 06:00) (67 - 92)  RR: 22 (08 Sep 2018 06:00) (18 - 31)  SpO2: 100% (08 Sep 2018 06:00) (88% - 100%)    I/Os:   I&O's Detail    06 Sep 2018 07:01  -  07 Sep 2018 07:00  --------------------------------------------------------  IN:    Enteral Tube Flush: 180 mL    IV PiggyBack: 550 mL    octreotide  Infusion: 252 mL    sodium chloride 0.9%: 250 mL    Vivonex RTH: 1120 mL  Total IN: 2352 mL    OUT:    Chest Tube: 100 mL    Indwelling Catheter - Urethral: 2595 mL    PEG (Percutaneous Endoscopic Gastrostomy) Tube: 155 mL  Total OUT: 2850 mL    Total NET: -498 mL      07 Sep 2018 07:01  -  08 Sep 2018 06:46  --------------------------------------------------------  IN:    Enteral Tube Flush: 180 mL    IV PiggyBack: 225 mL    octreotide  Infusion: 288 mL    Vivonex RTH: 1320 mL  Total IN: 2013 mL    OUT:    Indwelling Catheter - Urethral: 1190 mL  Total OUT: 1190 mL    Total NET: 823 mL          CAPILLARY BLOOD GLUCOSE          =======================MEDICATIONS===================  MEDICATIONS  (STANDING):  acetaminophen  IVPB. 1000 milliGRAM(s) IV Intermittent once  docusate sodium Liquid 100 milliGRAM(s) Oral three times a day  dornase selina Solution 2.5 milliGRAM(s) Inhalation daily  famotidine    Tablet 20 milliGRAM(s) Oral daily  fluconAZOLE IVPB      fluconAZOLE IVPB 200 milliGRAM(s) IV Intermittent every 24 hours  heparin  Injectable 5000 Unit(s) SubCutaneous every 8 hours  ipratropium    for Nebulization 500 MICROGram(s) Nebulizer every 6 hours  levalbuterol Inhalation 0.63 milliGRAM(s) Inhalation every 6 hours  medium chain triglycerides Oil 15 milliLiter(s) Oral three times a day  meropenem  IVPB 1000 milliGRAM(s) IV Intermittent every 8 hours  metoprolol tartrate 25 milliGRAM(s) Oral every 8 hours  octreotide  Infusion 60 MICROgram(s)/Hr (12 mL/Hr) IV Continuous <Continuous>    MEDICATIONS  (PRN):  acetaminophen  IVPB .. 1000 milliGRAM(s) IV Intermittent once PRN Mild Pain (1 - 3), Moderate Pain (4 - 6)  diphenhydrAMINE   Injectable 25 milliGRAM(s) IV Push once PRN sleep  ketorolac   Injectable 15 milliGRAM(s) IV Push every 6 hours PRN Moderate Pain (4 - 6)  melatonin 3 milliGRAM(s) Oral at bedtime PRN Sleep  oxyCODONE    Solution 5 milliGRAM(s) Oral every 4 hours PRN Moderate Pain (4 - 6)        PHYSICAL EXAM============================  General:                         Awake, alert, not in any distress  Neuro:                            Moving all extremities to commands.   Respiratory:	Air entry fair and  bilateral conducted sounds                                          CV:		Regular rate and rhythm. Normal S1/S2                                          Distal pulses present.  Abdomen:	                     Soft, non-distended. Bowel sounds present   Skin:		No rash.  Extremities:	Warm, no cyanosis or edema.  Palpable pulses    ============================LABS=========================                        11.0   13.48 )-----------( 432      ( 08 Sep 2018 03:15 )             33.2     09-08    139  |  97<L>  |  20  ----------------------------<  162<H>  4.1   |  34<H>  |  0.38<L>    Ca    9.0      08 Sep 2018 03:15  Phos  2.7     09-07  Mg     2.2     09-07    TPro  5.7<L>  /  Alb  2.5<L>  /  TBili  0.2  /  DBili  x   /  AST  15  /  ALT  14  /  AlkPhos  88  09-08    LIVER FUNCTIONS - ( 08 Sep 2018 03:15 )  Alb: 2.5 g/dL / Pro: 5.7 g/dL / ALK PHOS: 88 u/L / ALT: 14 u/L / AST: 15 u/L / GGT: x                   ============================IMAGING STUDIES=========================  < from: Xray Chest 1 View- PORTABLE-Routine (09.07.18 @ 06:23) >  IMPRESSION:  No definite pneumothorax seen following right chest tube   removal however evaluation is limited by multiple linear artifacts   overlying the right chest.    Small right pleural effusion and right basilar opacity which could be due   to atelectasis and/or pneumonia in the appropriate clinical context are   not significantly changed.      A/P:    83yFemale s/p RVATS /   Right upper lobectomy   08/27/2018 experiencing  pain with deep breathing. Postop period is significant for SVT / A-Fib, atelectasis of right lung, pneumonia requiring bronchoscopy multiple times.                             Neuro:    Ambulatory                                        Pain control with  Tylenol only, avoid narcotics                            Cardiovascular:                                          Continue hemodynamic monitoring.    SVT / A-fb: Continue Lopressor 25mg q8hrs, increase as tolerated &  no more pauses     HTN:  Increase lopressor as tolerated                            Respiratory:                                         Sitting in chair, NAD     Pt is on 2L  nasal canula,       Rt lower lobe Serratia pneumonia: On Meropenem  / Diflucan     Atelectasis - Aggressive pulmonary toilet + Pulmozyme + Saline inhalations                                         mild SOB, not in any distress.                                         Using incentive spirometry                                          Chest tube d/cd                                                           COPD: Continue  Xopenex / Atrovent nebulizer Rx.                             GI                                         Chylothorax ?  - On Vivenex + MCT oil. Currently on Octreotide drip     NPO  with tube feeds                                         Continue GI prophylaxis with  Protonix                                         Continue Zofran / Reglan for nausea - PRN	                                                                 Renal:                                         Gentle diuresis                                         Monitor I/Os and electrolytes                                                                                        Hem/ Onc:                                                                                  Monitor for signs of bleeding.                                          Follow CBC in AM                           Infectious disease:      Rt lower pneumonia: On Meropenem  / Diflucan. WBC coming down       Monitor for fever / leukocytosis.                                         All surgical incision / chest tube  sites look clean                            Endocrine                                             Continue Accu-Checks with coverage    Pt is on SQ Heparin and Venodyne boots for DVT prophylaxis.     Pertinent clinical, laboratory, radiographic, hemodynamic, echocardiographic, respiratory data, microbiologic data and chart were reviewed and analyzed frequently throughout the course of the day and night  Patient seen, examined and plan discussed with CT Surgeon Dr. Gallegos / CTICU team during rounds.    Pt's status discussed with family at bedside, updated status     is aware of need for rehab placement next week      Santiago Garcia DO, FACEP

## 2018-09-09 LAB
ALBUMIN SERPL ELPH-MCNC: 2.5 G/DL — LOW (ref 3.3–5)
ALBUMIN SERPL ELPH-MCNC: 2.6 G/DL — LOW (ref 3.3–5)
ALP SERPL-CCNC: 120 U/L — SIGNIFICANT CHANGE UP (ref 40–120)
ALP SERPL-CCNC: 121 U/L — HIGH (ref 40–120)
ALT FLD-CCNC: 16 U/L — SIGNIFICANT CHANGE UP (ref 4–33)
ALT FLD-CCNC: 19 U/L — SIGNIFICANT CHANGE UP (ref 4–33)
AST SERPL-CCNC: 17 U/L — SIGNIFICANT CHANGE UP (ref 4–32)
AST SERPL-CCNC: 25 U/L — SIGNIFICANT CHANGE UP (ref 4–32)
BACTERIA BLD CULT: SIGNIFICANT CHANGE UP
BASOPHILS # BLD AUTO: 0.06 K/UL — SIGNIFICANT CHANGE UP (ref 0–0.2)
BASOPHILS NFR BLD AUTO: 0.5 % — SIGNIFICANT CHANGE UP (ref 0–2)
BILIRUB SERPL-MCNC: 0.3 MG/DL — SIGNIFICANT CHANGE UP (ref 0.2–1.2)
BILIRUB SERPL-MCNC: 0.3 MG/DL — SIGNIFICANT CHANGE UP (ref 0.2–1.2)
BUN SERPL-MCNC: 19 MG/DL — SIGNIFICANT CHANGE UP (ref 7–23)
BUN SERPL-MCNC: 20 MG/DL — SIGNIFICANT CHANGE UP (ref 7–23)
CALCIUM SERPL-MCNC: 8.9 MG/DL — SIGNIFICANT CHANGE UP (ref 8.4–10.5)
CALCIUM SERPL-MCNC: 9.2 MG/DL — SIGNIFICANT CHANGE UP (ref 8.4–10.5)
CHLORIDE SERPL-SCNC: 97 MMOL/L — LOW (ref 98–107)
CHLORIDE SERPL-SCNC: 98 MMOL/L — SIGNIFICANT CHANGE UP (ref 98–107)
CO2 SERPL-SCNC: 31 MMOL/L — SIGNIFICANT CHANGE UP (ref 22–31)
CO2 SERPL-SCNC: 32 MMOL/L — HIGH (ref 22–31)
CREAT SERPL-MCNC: 0.37 MG/DL — LOW (ref 0.5–1.3)
CREAT SERPL-MCNC: 0.4 MG/DL — LOW (ref 0.5–1.3)
EOSINOPHIL # BLD AUTO: 0.4 K/UL — SIGNIFICANT CHANGE UP (ref 0–0.5)
EOSINOPHIL NFR BLD AUTO: 3.2 % — SIGNIFICANT CHANGE UP (ref 0–6)
GLUCOSE SERPL-MCNC: 126 MG/DL — HIGH (ref 70–99)
GLUCOSE SERPL-MCNC: 141 MG/DL — HIGH (ref 70–99)
HCT VFR BLD CALC: 32.9 % — LOW (ref 34.5–45)
HCT VFR BLD CALC: 34.4 % — LOW (ref 34.5–45)
HGB BLD-MCNC: 11 G/DL — LOW (ref 11.5–15.5)
HGB BLD-MCNC: 11.4 G/DL — LOW (ref 11.5–15.5)
IMM GRANULOCYTES # BLD AUTO: 0.16 # — SIGNIFICANT CHANGE UP
IMM GRANULOCYTES NFR BLD AUTO: 1.3 % — SIGNIFICANT CHANGE UP (ref 0–1.5)
LYMPHOCYTES # BLD AUTO: 0.78 K/UL — LOW (ref 1–3.3)
LYMPHOCYTES # BLD AUTO: 6.3 % — LOW (ref 13–44)
MAGNESIUM SERPL-MCNC: 2 MG/DL — SIGNIFICANT CHANGE UP (ref 1.6–2.6)
MAGNESIUM SERPL-MCNC: 2.3 MG/DL — SIGNIFICANT CHANGE UP (ref 1.6–2.6)
MCHC RBC-ENTMCNC: 30.2 PG — SIGNIFICANT CHANGE UP (ref 27–34)
MCHC RBC-ENTMCNC: 30.9 PG — SIGNIFICANT CHANGE UP (ref 27–34)
MCHC RBC-ENTMCNC: 33.1 % — SIGNIFICANT CHANGE UP (ref 32–36)
MCHC RBC-ENTMCNC: 33.4 % — SIGNIFICANT CHANGE UP (ref 32–36)
MCV RBC AUTO: 91.2 FL — SIGNIFICANT CHANGE UP (ref 80–100)
MCV RBC AUTO: 92.4 FL — SIGNIFICANT CHANGE UP (ref 80–100)
MONOCYTES # BLD AUTO: 0.88 K/UL — SIGNIFICANT CHANGE UP (ref 0–0.9)
MONOCYTES NFR BLD AUTO: 7.1 % — SIGNIFICANT CHANGE UP (ref 2–14)
NEUTROPHILS # BLD AUTO: 10.17 K/UL — HIGH (ref 1.8–7.4)
NEUTROPHILS NFR BLD AUTO: 81.6 % — HIGH (ref 43–77)
NRBC # FLD: 0 — SIGNIFICANT CHANGE UP
NRBC # FLD: 0 — SIGNIFICANT CHANGE UP
PHOSPHATE SERPL-MCNC: 2 MG/DL — LOW (ref 2.5–4.5)
PHOSPHATE SERPL-MCNC: 2.3 MG/DL — LOW (ref 2.5–4.5)
PLATELET # BLD AUTO: 436 K/UL — HIGH (ref 150–400)
PLATELET # BLD AUTO: 440 K/UL — HIGH (ref 150–400)
PMV BLD: 9.3 FL — SIGNIFICANT CHANGE UP (ref 7–13)
PMV BLD: 9.9 FL — SIGNIFICANT CHANGE UP (ref 7–13)
POTASSIUM SERPL-MCNC: 3.7 MMOL/L — SIGNIFICANT CHANGE UP (ref 3.5–5.3)
POTASSIUM SERPL-MCNC: 4.2 MMOL/L — SIGNIFICANT CHANGE UP (ref 3.5–5.3)
POTASSIUM SERPL-SCNC: 3.7 MMOL/L — SIGNIFICANT CHANGE UP (ref 3.5–5.3)
POTASSIUM SERPL-SCNC: 4.2 MMOL/L — SIGNIFICANT CHANGE UP (ref 3.5–5.3)
PROT SERPL-MCNC: 5.8 G/DL — LOW (ref 6–8.3)
PROT SERPL-MCNC: 5.9 G/DL — LOW (ref 6–8.3)
RBC # BLD: 3.56 M/UL — LOW (ref 3.8–5.2)
RBC # BLD: 3.77 M/UL — LOW (ref 3.8–5.2)
RBC # FLD: 13.3 % — SIGNIFICANT CHANGE UP (ref 10.3–14.5)
RBC # FLD: 13.5 % — SIGNIFICANT CHANGE UP (ref 10.3–14.5)
SODIUM SERPL-SCNC: 138 MMOL/L — SIGNIFICANT CHANGE UP (ref 135–145)
SODIUM SERPL-SCNC: 139 MMOL/L — SIGNIFICANT CHANGE UP (ref 135–145)
WBC # BLD: 11.24 K/UL — HIGH (ref 3.8–10.5)
WBC # BLD: 12.45 K/UL — HIGH (ref 3.8–10.5)
WBC # FLD AUTO: 11.24 K/UL — HIGH (ref 3.8–10.5)
WBC # FLD AUTO: 12.45 K/UL — HIGH (ref 3.8–10.5)

## 2018-09-09 PROCEDURE — 71045 X-RAY EXAM CHEST 1 VIEW: CPT | Mod: 26

## 2018-09-09 PROCEDURE — 99233 SBSQ HOSP IP/OBS HIGH 50: CPT

## 2018-09-09 RX ORDER — ACETAMINOPHEN 500 MG
1000 TABLET ORAL ONCE
Qty: 0 | Refills: 0 | Status: COMPLETED | OUTPATIENT
Start: 2018-09-09 | End: 2018-09-09

## 2018-09-09 RX ORDER — LANOLIN ALCOHOL/MO/W.PET/CERES
3 CREAM (GRAM) TOPICAL AT BEDTIME
Qty: 0 | Refills: 0 | Status: DISCONTINUED | OUTPATIENT
Start: 2018-09-09 | End: 2018-09-17

## 2018-09-09 RX ORDER — POTASSIUM CHLORIDE 20 MEQ
20 PACKET (EA) ORAL ONCE
Qty: 0 | Refills: 0 | Status: COMPLETED | OUTPATIENT
Start: 2018-09-09 | End: 2018-09-09

## 2018-09-09 RX ORDER — MAGNESIUM SULFATE 500 MG/ML
1 VIAL (ML) INJECTION ONCE
Qty: 0 | Refills: 0 | Status: COMPLETED | OUTPATIENT
Start: 2018-09-09 | End: 2018-09-09

## 2018-09-09 RX ORDER — POTASSIUM PHOSPHATE, MONOBASIC POTASSIUM PHOSPHATE, DIBASIC 236; 224 MG/ML; MG/ML
15 INJECTION, SOLUTION INTRAVENOUS ONCE
Qty: 0 | Refills: 0 | Status: COMPLETED | OUTPATIENT
Start: 2018-09-09 | End: 2018-09-09

## 2018-09-09 RX ORDER — FAMOTIDINE 10 MG/ML
20 INJECTION INTRAVENOUS DAILY
Qty: 0 | Refills: 0 | Status: DISCONTINUED | OUTPATIENT
Start: 2018-09-10 | End: 2018-09-17

## 2018-09-09 RX ORDER — DOCUSATE SODIUM 100 MG
100 CAPSULE ORAL THREE TIMES A DAY
Qty: 0 | Refills: 0 | Status: DISCONTINUED | OUTPATIENT
Start: 2018-09-09 | End: 2018-09-13

## 2018-09-09 RX ORDER — SODIUM,POTASSIUM PHOSPHATES 278-250MG
1 POWDER IN PACKET (EA) ORAL EVERY 6 HOURS
Qty: 0 | Refills: 0 | Status: COMPLETED | OUTPATIENT
Start: 2018-09-09 | End: 2018-09-10

## 2018-09-09 RX ADMIN — OCTREOTIDE ACETATE 12 MICROGRAM(S)/HR: 200 INJECTION, SOLUTION INTRAVENOUS; SUBCUTANEOUS at 19:00

## 2018-09-09 RX ADMIN — Medication 1000 MILLIGRAM(S): at 19:00

## 2018-09-09 RX ADMIN — Medication 15 MILLILITER(S): at 05:35

## 2018-09-09 RX ADMIN — Medication 500 MICROGRAM(S): at 10:58

## 2018-09-09 RX ADMIN — Medication 15 MILLILITER(S): at 22:06

## 2018-09-09 RX ADMIN — HEPARIN SODIUM 5000 UNIT(S): 5000 INJECTION INTRAVENOUS; SUBCUTANEOUS at 05:35

## 2018-09-09 RX ADMIN — MEROPENEM 100 MILLIGRAM(S): 1 INJECTION INTRAVENOUS at 05:35

## 2018-09-09 RX ADMIN — Medication 15 MILLILITER(S): at 12:45

## 2018-09-09 RX ADMIN — Medication 500 MICROGRAM(S): at 16:09

## 2018-09-09 RX ADMIN — LEVALBUTEROL 0.63 MILLIGRAM(S): 1.25 SOLUTION, CONCENTRATE RESPIRATORY (INHALATION) at 10:58

## 2018-09-09 RX ADMIN — APIXABAN 2.5 MILLIGRAM(S): 2.5 TABLET, FILM COATED ORAL at 05:35

## 2018-09-09 RX ADMIN — Medication 500 MICROGRAM(S): at 05:05

## 2018-09-09 RX ADMIN — Medication 100 MILLIGRAM(S): at 22:05

## 2018-09-09 RX ADMIN — Medication 400 MILLIGRAM(S): at 18:30

## 2018-09-09 RX ADMIN — Medication 3 MILLIGRAM(S): at 22:05

## 2018-09-09 RX ADMIN — LEVALBUTEROL 0.63 MILLIGRAM(S): 1.25 SOLUTION, CONCENTRATE RESPIRATORY (INHALATION) at 16:09

## 2018-09-09 RX ADMIN — Medication 1 PACKET(S): at 16:46

## 2018-09-09 RX ADMIN — Medication 25 MILLIGRAM(S): at 22:05

## 2018-09-09 RX ADMIN — MEROPENEM 100 MILLIGRAM(S): 1 INJECTION INTRAVENOUS at 14:41

## 2018-09-09 RX ADMIN — FLUCONAZOLE 100 MILLIGRAM(S): 150 TABLET ORAL at 12:45

## 2018-09-09 RX ADMIN — LEVALBUTEROL 0.63 MILLIGRAM(S): 1.25 SOLUTION, CONCENTRATE RESPIRATORY (INHALATION) at 21:48

## 2018-09-09 RX ADMIN — Medication 100 GRAM(S): at 06:40

## 2018-09-09 RX ADMIN — Medication 25 MILLIGRAM(S): at 12:46

## 2018-09-09 RX ADMIN — DORNASE ALFA 2.5 MILLIGRAM(S): 1 SOLUTION RESPIRATORY (INHALATION) at 10:59

## 2018-09-09 RX ADMIN — Medication 20 MILLIEQUIVALENT(S): at 06:40

## 2018-09-09 RX ADMIN — POTASSIUM PHOSPHATE, MONOBASIC POTASSIUM PHOSPHATE, DIBASIC 62.5 MILLIMOLE(S): 236; 224 INJECTION, SOLUTION INTRAVENOUS at 06:41

## 2018-09-09 RX ADMIN — OCTREOTIDE ACETATE 12 MICROGRAM(S)/HR: 200 INJECTION, SOLUTION INTRAVENOUS; SUBCUTANEOUS at 08:00

## 2018-09-09 RX ADMIN — Medication 100 MILLIGRAM(S): at 05:35

## 2018-09-09 RX ADMIN — MEROPENEM 100 MILLIGRAM(S): 1 INJECTION INTRAVENOUS at 22:05

## 2018-09-09 RX ADMIN — Medication 100 MILLIGRAM(S): at 12:42

## 2018-09-09 RX ADMIN — Medication 25 MILLIGRAM(S): at 05:35

## 2018-09-09 RX ADMIN — LEVALBUTEROL 0.63 MILLIGRAM(S): 1.25 SOLUTION, CONCENTRATE RESPIRATORY (INHALATION) at 05:05

## 2018-09-09 RX ADMIN — Medication 500 MICROGRAM(S): at 21:48

## 2018-09-09 NOTE — PROGRESS NOTE ADULT - SUBJECTIVE AND OBJECTIVE BOX
LIAM VELOZ            [id number]         No Known Allergies               83 y.o. female with hx of COPD, c/o SOB, dyspnea on exertion, fatigue, weight loss of 15 lbs in last 6 months, reports occasional nonproductive cough, s/p CXR, followed by CT scan, reports multiple right lung nodules noted admitted  for lung resection.      Procedure:  RVATS /   Right upper lobectomy   08/27/2018 08/30, 08/31, 09/01 Bronch       Issues:   Aspiration Pneumonia  Atelectasis  Shortness of breath / Bronchospasm / COPD exacerbation  SVT / a-FIB              Postop pain  HTN                 Home Medications:  Advair Diskus 250 mcg-50 mcg inhalation powder: 1 puff(s) inhaled 2 times a day (27 Aug 2018 12:42)  Spiriva 18 mcg inhalation capsule: 1 cap(s) inhaled once a day (27 Aug 2018 12:42)      PAST MEDICAL & SURGICAL HISTORY:  Lung nodules  COPD (Chronic Obstructive Pulmonary Disease): diagnose in ~2013  Colon Polyps  History of Osteoporosis  History of Cataract Surgery: left eye  Hip Replacement: bilateral        ICU Vital Signs Last 24 Hrs  T(C): 36.5 (09 Sep 2018 04:00), Max: 37 (08 Sep 2018 16:00)  T(F): 97.7 (09 Sep 2018 04:00), Max: 98.6 (08 Sep 2018 16:00)  HR: 74 (09 Sep 2018 05:06) (63 - 80)  BP: 145/66 (09 Sep 2018 05:00) (114/56 - 156/63)  BP(mean): 84 (09 Sep 2018 05:00) (66 - 121)  ABP: --  ABP(mean): --  RR: 23 (09 Sep 2018 05:00) (17 - 26)  SpO2: 95% (09 Sep 2018 05:06) (92% - 100%)    I&O's Detail    07 Sep 2018 07:01  -  08 Sep 2018 07:00  --------------------------------------------------------  IN:    Enteral Tube Flush: 180 mL    IV PiggyBack: 225 mL    octreotide  Infusion: 288 mL    Vivonex RTH: 1320 mL  Total IN: 2013 mL    OUT:    Indwelling Catheter - Urethral: 1290 mL  Total OUT: 1290 mL    Total NET: 723 mL      08 Sep 2018 07:01  -  09 Sep 2018 05:57  --------------------------------------------------------  IN:    Enteral Tube Flush: 210 mL    IV PiggyBack: 350 mL    octreotide  Infusion: 264 mL    Vivonex RTH: 1210 mL  Total IN: 2034 mL    OUT:    Indwelling Catheter - Urethral: 1905 mL  Total OUT: 1905 mL    Total NET: 129 mL        CAPILLARY BLOOD GLUCOSE          Home Medications:  Advair Diskus 250 mcg-50 mcg inhalation powder: 1 puff(s) inhaled 2 times a day (27 Aug 2018 12:42)  Spiriva 18 mcg inhalation capsule: 1 cap(s) inhaled once a day (27 Aug 2018 12:42)      MEDICATIONS  (STANDING):  acetaminophen  IVPB. 1000 milliGRAM(s) IV Intermittent once  apixaban 2.5 milliGRAM(s) Oral every 12 hours  docusate sodium Liquid 100 milliGRAM(s) Oral three times a day  dornase selina Solution 2.5 milliGRAM(s) Inhalation daily  famotidine    Tablet 20 milliGRAM(s) Oral daily  fluconAZOLE IVPB      fluconAZOLE IVPB 200 milliGRAM(s) IV Intermittent every 24 hours  heparin  Injectable 5000 Unit(s) SubCutaneous every 8 hours  ipratropium    for Nebulization 500 MICROGram(s) Nebulizer every 6 hours  levalbuterol Inhalation 0.63 milliGRAM(s) Inhalation every 6 hours  medium chain triglycerides Oil 15 milliLiter(s) Oral three times a day  meropenem  IVPB 1000 milliGRAM(s) IV Intermittent every 8 hours  metoprolol tartrate 25 milliGRAM(s) Oral every 8 hours  octreotide  Infusion 60 MICROgram(s)/Hr (12 mL/Hr) IV Continuous <Continuous>    MEDICATIONS  (PRN):  acetaminophen  IVPB .. 1000 milliGRAM(s) IV Intermittent once PRN Mild Pain (1 - 3), Moderate Pain (4 - 6)  diphenhydrAMINE   Injectable 25 milliGRAM(s) IV Push once PRN sleep  ketorolac   Injectable 15 milliGRAM(s) IV Push every 6 hours PRN Moderate Pain (4 - 6)  melatonin 3 milliGRAM(s) Oral at bedtime PRN Sleep  oxyCODONE    Solution 5 milliGRAM(s) Oral every 4 hours PRN Moderate Pain (4 - 6)          Physical exam:   General:               Pt is awake, appears to be fairly comfortable and  not in any distress                                                  Neuro:                  Nonfocal                             Cardiovascular:     S1 & S2, regular / irregular                          Respiratory:         Air entry is slightly decreased at right base, has bilateral rhonchi /  conducted sounds                           GI:                         Soft, nondistended and nontender, Bowel sounds active                            Ext:                        No cyanosis or edema                            Labs:                                                                           11.4   12.45 )-----------( 436      ( 09 Sep 2018 04:20 )             34.4             09-09    138  |  97<L>  |  20  ----------------------------<  141<H>  3.7   |  32<H>  |  0.40<L>    Ca    9.2      09 Sep 2018 04:20  Phos  2.0     09-09  Mg     2.0     09-09    TPro  5.9<L>  /  Alb  2.6<L>  /  TBili  0.3  /  DBili  x   /  AST  17  /  ALT  16  /  AlkPhos  121<H>  09-09                    LIVER FUNCTIONS - ( 09 Sep 2018 04:20 )  Alb: 2.6 g/dL / Pro: 5.9 g/dL / ALK PHOS: 121 u/L / ALT: 16 u/L / AST: 17 u/L / GGT: x                   CXR:      Plan:    General: 83yFemale s/p RVATS /   Right upper lobectomy   08/27/2018 experiencing  pain with deep breathing. Postop period is significant for SVT / A-Fib, atelectasis of right lung, pneumonia requiring bronchoscopy multiple times.                             Neuro:                                         Pain control with  Tylenol only, avoid narcotics                            Cardiovascular:                                          Continue hemodynamic monitoring.    SVT / A-fb: Continue Lopressor 25mg q8hrs, increase as tolerated &  no more pauses. Started Eliquis for anticoagulation    HTN:  Increase lopressor as tolerated                            Respiratory:                                              Pt is on 2L  nasal canula,wean off as tolerated       Rt lower lobe Serratia pneumonia: On Meropenem  / Diflucan,  to be completed on 09/12     Atelectasis - Aggressive pulmonary toilet + Pulmozyme + Saline inhalations                                         mild SOB, not in any distress.                                         Using incentive spirometry  / 500cc                                          Chest tube d/cd                                                           COPD: Continue  Xopenex / Atrovent nebulizer Rx.                             GI                                         Chylothorax ?  - On Vivenex + MCT oil. Currently on Octreotide drip     NPO  with tube feeds                                         Continue GI prophylaxis with  Protonix                                         Continue Zofran / Reglan for nausea - PRN	                                                                 Renal:                                         Gentle diuresis                                         Monitor I/Os and electrolytes                                                                                        Hem/ Onc:                                                                                  Monitor for signs of bleeding.                                          Follow CBC in AM                           Infectious disease:      Rt lower pneumonia: On Meropenem  / Diflucan. WBC coming down       Monitor for fever / leukocytosis.                                         All surgical incision / chest tube  sites look clean                            Endocrine                                             Continue Accu-Checks with coverage    Pt is on full anticoagulation with Eliquis and Venodyne boots for DVT prophylaxis.     Pertinent clinical, laboratory, radiographic, hemodynamic, echocardiographic, respiratory data, microbiologic data and chart were reviewed and analyzed frequently throughout the course of the day and night  Patient seen, examined and plan discussed with CT Surgeon Dr. Gallegos / CTICU team during rounds.    Pt's status discussed with family at bedside, updated status     is aware of possible  rehab placement next week              Michael Moya MD LIAM VELOZ            [id number]         No Known Allergies               83 y.o. female with hx of COPD, c/o SOB, dyspnea on exertion, fatigue, weight loss of 15 lbs in last 6 months, reports occasional nonproductive cough, s/p CXR, followed by CT scan, reports multiple right lung nodules noted admitted  for lung resection.      Procedure:  RVATS /   Right upper lobectomy   08/27/2018 08/30, 08/31, 09/01 Bronch       Issues:   Aspiration Pneumonia  Atelectasis  Shortness of breath / Bronchospasm / COPD exacerbation  SVT / a-FIB              Postop pain  HTN                 Home Medications:  Advair Diskus 250 mcg-50 mcg inhalation powder: 1 puff(s) inhaled 2 times a day (27 Aug 2018 12:42)  Spiriva 18 mcg inhalation capsule: 1 cap(s) inhaled once a day (27 Aug 2018 12:42)      PAST MEDICAL & SURGICAL HISTORY:  Lung nodules  COPD (Chronic Obstructive Pulmonary Disease): diagnose in ~2013  Colon Polyps  History of Osteoporosis  History of Cataract Surgery: left eye  Hip Replacement: bilateral        ICU Vital Signs Last 24 Hrs  T(C): 36.5 (09 Sep 2018 04:00), Max: 37 (08 Sep 2018 16:00)  T(F): 97.7 (09 Sep 2018 04:00), Max: 98.6 (08 Sep 2018 16:00)  HR: 74 (09 Sep 2018 05:06) (63 - 80)  BP: 145/66 (09 Sep 2018 05:00) (114/56 - 156/63)  BP(mean): 84 (09 Sep 2018 05:00) (66 - 121)  ABP: --  ABP(mean): --  RR: 23 (09 Sep 2018 05:00) (17 - 26)  SpO2: 95% (09 Sep 2018 05:06) (92% - 100%)    I&O's Detail    07 Sep 2018 07:01  -  08 Sep 2018 07:00  --------------------------------------------------------  IN:    Enteral Tube Flush: 180 mL    IV PiggyBack: 225 mL    octreotide  Infusion: 288 mL    Vivonex RTH: 1320 mL  Total IN: 2013 mL    OUT:    Indwelling Catheter - Urethral: 1290 mL  Total OUT: 1290 mL    Total NET: 723 mL      08 Sep 2018 07:01  -  09 Sep 2018 05:57  --------------------------------------------------------  IN:    Enteral Tube Flush: 210 mL    IV PiggyBack: 350 mL    octreotide  Infusion: 264 mL    Vivonex RTH: 1210 mL  Total IN: 2034 mL    OUT:    Indwelling Catheter - Urethral: 1905 mL  Total OUT: 1905 mL    Total NET: 129 mL        CAPILLARY BLOOD GLUCOSE          Home Medications:  Advair Diskus 250 mcg-50 mcg inhalation powder: 1 puff(s) inhaled 2 times a day (27 Aug 2018 12:42)  Spiriva 18 mcg inhalation capsule: 1 cap(s) inhaled once a day (27 Aug 2018 12:42)      MEDICATIONS  (STANDING):  acetaminophen  IVPB. 1000 milliGRAM(s) IV Intermittent once  apixaban 2.5 milliGRAM(s) Oral every 12 hours  docusate sodium Liquid 100 milliGRAM(s) Oral three times a day  dornase selina Solution 2.5 milliGRAM(s) Inhalation daily  famotidine    Tablet 20 milliGRAM(s) Oral daily  fluconAZOLE IVPB      fluconAZOLE IVPB 200 milliGRAM(s) IV Intermittent every 24 hours  heparin  Injectable 5000 Unit(s) SubCutaneous every 8 hours  ipratropium    for Nebulization 500 MICROGram(s) Nebulizer every 6 hours  levalbuterol Inhalation 0.63 milliGRAM(s) Inhalation every 6 hours  medium chain triglycerides Oil 15 milliLiter(s) Oral three times a day  meropenem  IVPB 1000 milliGRAM(s) IV Intermittent every 8 hours  metoprolol tartrate 25 milliGRAM(s) Oral every 8 hours  octreotide  Infusion 60 MICROgram(s)/Hr (12 mL/Hr) IV Continuous <Continuous>    MEDICATIONS  (PRN):  acetaminophen  IVPB .. 1000 milliGRAM(s) IV Intermittent once PRN Mild Pain (1 - 3), Moderate Pain (4 - 6)  diphenhydrAMINE   Injectable 25 milliGRAM(s) IV Push once PRN sleep  ketorolac   Injectable 15 milliGRAM(s) IV Push every 6 hours PRN Moderate Pain (4 - 6)  melatonin 3 milliGRAM(s) Oral at bedtime PRN Sleep  oxyCODONE    Solution 5 milliGRAM(s) Oral every 4 hours PRN Moderate Pain (4 - 6)          Physical exam:   General:               Pt is awake, appears to be fairly comfortable and  not in any distress                                                  Neuro:                  Nonfocal                             Cardiovascular:     S1 & S2, regular / irregular                          Respiratory:         Air entry is slightly decreased at right base, has bilateral rhonchi /  conducted sounds                           GI:                         Soft, nondistended and nontender, Bowel sounds active                            Ext:                        No cyanosis or edema                            Labs:                                                                           11.4   12.45 )-----------( 436      ( 09 Sep 2018 04:20 )             34.4             09-09    138  |  97<L>  |  20  ----------------------------<  141<H>  3.7   |  32<H>  |  0.40<L>    Ca    9.2      09 Sep 2018 04:20  Phos  2.0     09-09  Mg     2.0     09-09    TPro  5.9<L>  /  Alb  2.6<L>  /  TBili  0.3  /  DBili  x   /  AST  17  /  ALT  16  /  AlkPhos  121<H>  09-09                    LIVER FUNCTIONS - ( 09 Sep 2018 04:20 )  Alb: 2.6 g/dL / Pro: 5.9 g/dL / ALK PHOS: 121 u/L / ALT: 16 u/L / AST: 17 u/L / GGT: x               CXR:    < from: Xray Chest 1 View- PORTABLE-Routine (09.08.18 @ 06:23) >  Small right pleural effusion with adjacent atelectasis and/or pneumonia   is unchanged.          Plan:    General: 83yFemale s/p RVATS /   Right upper lobectomy   08/27/2018 experiencing  pain with deep breathing. Postop period is significant for SVT / A-Fib, atelectasis of right lung, pneumonia requiring bronchoscopy multiple times.                             Neuro:                                         Pain control with  Tylenol only, avoid narcotics                            Cardiovascular:                                          Continue hemodynamic monitoring.    SVT / A-fb: Continue Lopressor 25mg q8hrs, increase as tolerated &  no more pauses. Started Eliquis for anticoagulation    HTN:  Increase lopressor as tolerated                            Respiratory:                                              Pt is on 2L  nasal canula,wean off as tolerated       Rt lower lobe Serratia pneumonia: On Meropenem  / Diflucan,  to be completed on 09/12     Atelectasis - Aggressive pulmonary toilet + Pulmozyme + Saline inhalations                                         mild SOB, not in any distress.                                         Using incentive spirometry  / 500cc                                          Chest tube d/cd                                                           COPD: Continue  Xopenex / Atrovent nebulizer Rx.                             GI                                         Chylothorax ?  - On Vivenex + MCT oil. Currently on Octreotide drip     NPO  with tube feeds                                         Continue GI prophylaxis with  Protonix                                         Continue Zofran / Reglan for nausea - PRN	                                                                 Renal:                                         Gentle diuresis                                         Monitor I/Os and electrolytes                                                                                        Hem/ Onc:                                                                                  Monitor for signs of bleeding.                                          Follow CBC in AM                           Infectious disease:      Rt lower pneumonia: On Meropenem  / Diflucan. WBC coming down       Monitor for fever / leukocytosis.                                         All surgical incision / chest tube  sites look clean                            Endocrine                                             Continue Accu-Checks with coverage    Pt is on full anticoagulation with Eliquis and Venodyne boots for DVT prophylaxis.     Pertinent clinical, laboratory, radiographic, hemodynamic, echocardiographic, respiratory data, microbiologic data and chart were reviewed and analyzed frequently throughout the course of the day and night  Patient seen, examined and plan discussed with CT Surgeon Dr. Gallegos / CTICU team during rounds.    Pt's status discussed with family at bedside, updated status     is aware of possible  rehab placement next week              Michael Moya MD

## 2018-09-10 ENCOUNTER — TRANSCRIPTION ENCOUNTER (OUTPATIENT)
Age: 83
End: 2018-09-10

## 2018-09-10 LAB
APTT BLD: 23.8 SEC — LOW (ref 27.5–37.4)
BUN SERPL-MCNC: 18 MG/DL — SIGNIFICANT CHANGE UP (ref 7–23)
CALCIUM SERPL-MCNC: 8.8 MG/DL — SIGNIFICANT CHANGE UP (ref 8.4–10.5)
CHLORIDE SERPL-SCNC: 99 MMOL/L — SIGNIFICANT CHANGE UP (ref 98–107)
CO2 SERPL-SCNC: 29 MMOL/L — SIGNIFICANT CHANGE UP (ref 22–31)
CREAT SERPL-MCNC: 0.38 MG/DL — LOW (ref 0.5–1.3)
GLUCOSE SERPL-MCNC: 101 MG/DL — HIGH (ref 70–99)
HCT VFR BLD CALC: 33.8 % — LOW (ref 34.5–45)
HGB BLD-MCNC: 11.7 G/DL — SIGNIFICANT CHANGE UP (ref 11.5–15.5)
INR BLD: 1.17 — SIGNIFICANT CHANGE UP (ref 0.88–1.17)
MAGNESIUM SERPL-MCNC: 2.1 MG/DL — SIGNIFICANT CHANGE UP (ref 1.6–2.6)
MCHC RBC-ENTMCNC: 31.3 PG — SIGNIFICANT CHANGE UP (ref 27–34)
MCHC RBC-ENTMCNC: 34.6 % — SIGNIFICANT CHANGE UP (ref 32–36)
MCV RBC AUTO: 90.4 FL — SIGNIFICANT CHANGE UP (ref 80–100)
NRBC # FLD: 0 — SIGNIFICANT CHANGE UP
PHOSPHATE SERPL-MCNC: 2.3 MG/DL — LOW (ref 2.5–4.5)
PLATELET # BLD AUTO: 476 K/UL — HIGH (ref 150–400)
PMV BLD: 9.9 FL — SIGNIFICANT CHANGE UP (ref 7–13)
POTASSIUM SERPL-MCNC: 4.3 MMOL/L — SIGNIFICANT CHANGE UP (ref 3.5–5.3)
POTASSIUM SERPL-SCNC: 4.3 MMOL/L — SIGNIFICANT CHANGE UP (ref 3.5–5.3)
PROTHROM AB SERPL-ACNC: 13 SEC — SIGNIFICANT CHANGE UP (ref 9.8–13.1)
RBC # BLD: 3.74 M/UL — LOW (ref 3.8–5.2)
RBC # FLD: 13.5 % — SIGNIFICANT CHANGE UP (ref 10.3–14.5)
SODIUM SERPL-SCNC: 139 MMOL/L — SIGNIFICANT CHANGE UP (ref 135–145)
WBC # BLD: 9.55 K/UL — SIGNIFICANT CHANGE UP (ref 3.8–10.5)
WBC # FLD AUTO: 9.55 K/UL — SIGNIFICANT CHANGE UP (ref 3.8–10.5)

## 2018-09-10 PROCEDURE — 99233 SBSQ HOSP IP/OBS HIGH 50: CPT

## 2018-09-10 PROCEDURE — 71045 X-RAY EXAM CHEST 1 VIEW: CPT | Mod: 26

## 2018-09-10 RX ORDER — SODIUM,POTASSIUM PHOSPHATES 278-250MG
1 POWDER IN PACKET (EA) ORAL
Qty: 0 | Refills: 0 | Status: COMPLETED | OUTPATIENT
Start: 2018-09-10 | End: 2018-09-12

## 2018-09-10 RX ORDER — POTASSIUM CHLORIDE 20 MEQ
20 PACKET (EA) ORAL ONCE
Qty: 0 | Refills: 0 | Status: COMPLETED | OUTPATIENT
Start: 2018-09-10 | End: 2018-09-10

## 2018-09-10 RX ORDER — SODIUM CHLORIDE 9 MG/ML
4 INJECTION INTRAMUSCULAR; INTRAVENOUS; SUBCUTANEOUS EVERY 8 HOURS
Qty: 0 | Refills: 0 | Status: COMPLETED | OUTPATIENT
Start: 2018-09-10 | End: 2018-09-14

## 2018-09-10 RX ORDER — ACETAMINOPHEN 500 MG
750 TABLET ORAL ONCE
Qty: 0 | Refills: 0 | Status: COMPLETED | OUTPATIENT
Start: 2018-09-10 | End: 2018-09-10

## 2018-09-10 RX ORDER — METOPROLOL TARTRATE 50 MG
2.5 TABLET ORAL ONCE
Qty: 0 | Refills: 0 | Status: COMPLETED | OUTPATIENT
Start: 2018-09-10 | End: 2018-09-10

## 2018-09-10 RX ORDER — ACETAMINOPHEN 500 MG
1000 TABLET ORAL ONCE
Qty: 0 | Refills: 0 | Status: COMPLETED | OUTPATIENT
Start: 2018-09-11 | End: 2018-09-14

## 2018-09-10 RX ORDER — FUROSEMIDE 40 MG
5 TABLET ORAL ONCE
Qty: 0 | Refills: 0 | Status: COMPLETED | OUTPATIENT
Start: 2018-09-10 | End: 2018-09-10

## 2018-09-10 RX ORDER — ACETAMINOPHEN 500 MG
750 TABLET ORAL ONCE
Qty: 0 | Refills: 0 | Status: COMPLETED | OUTPATIENT
Start: 2018-09-10 | End: 2018-09-11

## 2018-09-10 RX ADMIN — Medication 15 MILLILITER(S): at 21:50

## 2018-09-10 RX ADMIN — Medication 1 PACKET(S): at 00:06

## 2018-09-10 RX ADMIN — Medication 500 MICROGRAM(S): at 15:34

## 2018-09-10 RX ADMIN — LEVALBUTEROL 0.63 MILLIGRAM(S): 1.25 SOLUTION, CONCENTRATE RESPIRATORY (INHALATION) at 23:01

## 2018-09-10 RX ADMIN — Medication 1 PACKET(S): at 05:36

## 2018-09-10 RX ADMIN — LEVALBUTEROL 0.63 MILLIGRAM(S): 1.25 SOLUTION, CONCENTRATE RESPIRATORY (INHALATION) at 09:23

## 2018-09-10 RX ADMIN — Medication 500 MICROGRAM(S): at 23:01

## 2018-09-10 RX ADMIN — Medication 25 MILLIGRAM(S): at 13:39

## 2018-09-10 RX ADMIN — Medication 500 MICROGRAM(S): at 05:45

## 2018-09-10 RX ADMIN — DORNASE ALFA 2.5 MILLIGRAM(S): 1 SOLUTION RESPIRATORY (INHALATION) at 09:23

## 2018-09-10 RX ADMIN — FAMOTIDINE 20 MILLIGRAM(S): 10 INJECTION INTRAVENOUS at 11:03

## 2018-09-10 RX ADMIN — Medication 2.5 MILLIGRAM(S): at 04:44

## 2018-09-10 RX ADMIN — Medication 3 MILLIGRAM(S): at 21:49

## 2018-09-10 RX ADMIN — Medication 15 MILLILITER(S): at 05:35

## 2018-09-10 RX ADMIN — Medication 1 PACKET(S): at 11:03

## 2018-09-10 RX ADMIN — Medication 20 MILLIEQUIVALENT(S): at 21:57

## 2018-09-10 RX ADMIN — SODIUM CHLORIDE 4 MILLILITER(S): 9 INJECTION INTRAMUSCULAR; INTRAVENOUS; SUBCUTANEOUS at 23:02

## 2018-09-10 RX ADMIN — OCTREOTIDE ACETATE 12 MICROGRAM(S)/HR: 200 INJECTION, SOLUTION INTRAVENOUS; SUBCUTANEOUS at 19:23

## 2018-09-10 RX ADMIN — Medication 25 MILLIGRAM(S): at 05:36

## 2018-09-10 RX ADMIN — Medication 1 PACKET(S): at 17:11

## 2018-09-10 RX ADMIN — FLUCONAZOLE 100 MILLIGRAM(S): 150 TABLET ORAL at 11:46

## 2018-09-10 RX ADMIN — MEROPENEM 100 MILLIGRAM(S): 1 INJECTION INTRAVENOUS at 13:39

## 2018-09-10 RX ADMIN — LEVALBUTEROL 0.63 MILLIGRAM(S): 1.25 SOLUTION, CONCENTRATE RESPIRATORY (INHALATION) at 15:34

## 2018-09-10 RX ADMIN — Medication 1 PACKET(S): at 05:37

## 2018-09-10 RX ADMIN — LEVALBUTEROL 0.63 MILLIGRAM(S): 1.25 SOLUTION, CONCENTRATE RESPIRATORY (INHALATION) at 05:45

## 2018-09-10 RX ADMIN — MEROPENEM 100 MILLIGRAM(S): 1 INJECTION INTRAVENOUS at 21:49

## 2018-09-10 RX ADMIN — Medication 15 MILLILITER(S): at 13:46

## 2018-09-10 RX ADMIN — Medication 5 MILLIGRAM(S): at 21:56

## 2018-09-10 RX ADMIN — OCTREOTIDE ACETATE 12 MICROGRAM(S)/HR: 200 INJECTION, SOLUTION INTRAVENOUS; SUBCUTANEOUS at 08:00

## 2018-09-10 RX ADMIN — MEROPENEM 100 MILLIGRAM(S): 1 INJECTION INTRAVENOUS at 05:36

## 2018-09-10 RX ADMIN — Medication 500 MICROGRAM(S): at 09:23

## 2018-09-10 RX ADMIN — Medication 1 PACKET(S): at 23:19

## 2018-09-10 RX ADMIN — Medication 100 MILLIGRAM(S): at 05:36

## 2018-09-10 RX ADMIN — Medication 300 MILLIGRAM(S): at 14:10

## 2018-09-10 RX ADMIN — Medication 25 MILLIGRAM(S): at 21:49

## 2018-09-10 RX ADMIN — Medication 750 MILLIGRAM(S): at 14:25

## 2018-09-10 NOTE — PROGRESS NOTE ADULT - SUBJECTIVE AND OBJECTIVE BOX
Infectious Diseases progress note:    Subjective:  Sitting in chair, appears deconditioned, otherwise NAD.  Still with cough with thick yellow/gray phlegm.  Afebrile.   WBC normal.    ROS:  CONSTITUTIONAL:  No fever, chills, rigors  CARDIOVASCULAR:  No chest pain or palpitations  RESPIRATORY:   No SOB, cough, dyspnea on exertion.  No wheezing  GASTROINTESTINAL:  No abd pain, N/V, diarrhea/constipation  EXTREMITIES:  No swelling or joint pain  GENITOURINARY:  No burning on urination, increased frequency or urgency.  No flank pain  NEUROLOGIC:  No HA, visual disturbances  SKIN: No rashes    Allergies    No Known Allergies    Intolerances        ANTIBIOTICS/RELEVANT:  antimicrobials  fluconAZOLE IVPB      fluconAZOLE IVPB 200 milliGRAM(s) IV Intermittent every 24 hours  meropenem  IVPB 1000 milliGRAM(s) IV Intermittent every 8 hours    immunologic:    OTHER:  acetaminophen  IVPB .. 750 milliGRAM(s) IV Intermittent once PRN  apixaban 2.5 milliGRAM(s) Oral every 12 hours  docusate sodium Liquid 100 milliGRAM(s) Oral three times a day  dornase selina Solution 2.5 milliGRAM(s) Inhalation daily  famotidine    Tablet 20 milliGRAM(s) Oral daily  ipratropium    for Nebulization 500 MICROGram(s) Nebulizer every 6 hours  levalbuterol Inhalation 0.63 milliGRAM(s) Inhalation every 6 hours  medium chain triglycerides Oil 15 milliLiter(s) Oral three times a day  melatonin 3 milliGRAM(s) Oral at bedtime PRN  metoprolol tartrate 25 milliGRAM(s) Oral every 8 hours  octreotide  Infusion 60 MICROgram(s)/Hr IV Continuous <Continuous>  oxyCODONE    Solution 5 milliGRAM(s) Oral every 4 hours PRN  potassium phosphate / sodium phosphate powder 1 Packet(s) Oral four times a day      Objective:  Vital Signs Last 24 Hrs  T(C): 36.9 (10 Sep 2018 12:00), Max: 36.9 (10 Sep 2018 12:00)  T(F): 98.4 (10 Sep 2018 12:00), Max: 98.4 (10 Sep 2018 12:00)  HR: 61 (10 Sep 2018 15:34) (61 - 85)  BP: 149/67 (10 Sep 2018 14:00) (114/555 - 167/60)  BP(mean): 88 (10 Sep 2018 14:00) (73 - 777)  RR: 22 (10 Sep 2018 14:00) (18 - 28)  SpO2: 99% (10 Sep 2018 15:34) (91% - 100%)    PHYSICAL EXAM:  Constitutional:NAD  Eyes:ELE, EOMI  Ear/Nose/Throat: no thrush, mucositis.  Moist mucous membranes	  Neck:no JVD, no lymphadenopathy, supple  Respiratory: CTA lanette  Cardiovascular: S1S2 RRR, no murmurs  Gastrointestinal:soft, nontender,  nondistended (+) BS  Extremities:no e/e/c  Skin:  no rashes, open wounds or ulcerations, prior rt chest tubes site c/d/i        LABS:                        11.7   9.55  )-----------( 476      ( 10 Sep 2018 03:00 )             33.8     09-10    139  |  99  |  18  ----------------------------<  101<H>  4.3   |  29  |  0.38<L>    Ca    8.8      10 Sep 2018 03:00  Phos  2.3     09-10  Mg     2.1     09-10    TPro  5.8<L>  /  Alb  2.5<L>  /  TBili  0.3  /  DBili  x   /  AST  25  /  ALT  19  /  AlkPhos  120  09-09    PT/INR - ( 10 Sep 2018 03:00 )   PT: 13.0 SEC;   INR: 1.17          PTT - ( 10 Sep 2018 03:00 )  PTT:23.8 SEC                        MICROBIOLOGY:    Culture - Blood (09.04.18 @ 11:42)    Culture - Blood:   NO ORGANISMS ISOLATED    Specimen Source: BLOOD VENOUS          RADIOLOGY & ADDITIONAL STUDIES:    < from: Xray Chest 1 View- PORTABLE-Routine (09.10.18 @ 06:47) >  FINDINGS:    Lines/Tubes: None    Heart and mediastinum:  Unchanged.    Lungs, pleura, and airways: Unchanged right pleural effusion with right   basilar atelectasis    Bones and soft tissues: The bones and soft tissues are unchanged.    Impression:    Unchanged right pleural effusion with right basilar atelectasis    < end of copied text >

## 2018-09-10 NOTE — PROGRESS NOTE ADULT - SUBJECTIVE AND OBJECTIVE BOX
LIAM VELOZ          [id number]    HPI:      Procedure:  POD # :     Issues:        Interval/Overnight Events/ ROS  Pt remained hemodynamically stable overnight, not on any pressors or inotropes. OOB to chair, breathing comfortably with minimal pain. Ambulated several times . Denies pain, no SOB, no palpitations, no nausea/ no vomiting, no dizziness  A-line and soriano d/trina         PAST MEDICAL & SURGICAL HISTORY:  Lung nodules  COPD (Chronic Obstructive Pulmonary Disease): diagnose in ~2013  Colon Polyps  History of Osteoporosis  History of Cataract Surgery: left eye  Hip Replacement: bilateral    Allergies    No Known Allergies    Intolerances            ***VITAL SIGNS:  Vital Signs Last 24 Hrs  T(C): 36.6 (10 Sep 2018 04:00), Max: 36.9 (09 Sep 2018 12:00)  T(F): 97.8 (10 Sep 2018 04:00), Max: 98.4 (09 Sep 2018 12:00)  HR: 67 (10 Sep 2018 03:00) (64 - 78)  BP: 143/60 (10 Sep 2018 03:00) (114/555 - 168/57)  BP(mean): 80 (10 Sep 2018 03:00) (68 - 777)  RR: 22 (10 Sep 2018 03:00) (20 - 28)  SpO2: 96% (10 Sep 2018 03:00) (91% - 99%)    I/Os:   I&O's Detail    08 Sep 2018 07:01  -  09 Sep 2018 07:00  --------------------------------------------------------  IN:    Enteral Tube Flush: 240 mL    IV PiggyBack: 562.5 mL    octreotide  Infusion: 288 mL    Vivonex RTH: 1320 mL  Total IN: 2410.5 mL    OUT:    Indwelling Catheter - Urethral: 2030 mL  Total OUT: 2030 mL    Total NET: 380.5 mL      09 Sep 2018 07:01  -  10 Sep 2018 04:49  --------------------------------------------------------  IN:    Enteral Tube Flush: 150 mL    IV PiggyBack: 350 mL    octreotide  Infusion: 252 mL    Vivonex RTH: 880 mL  Total IN: 1632 mL    OUT:    Indwelling Catheter - Urethral: 2110 mL  Total OUT: 2110 mL    Total NET: -478 mL          CAPILLARY BLOOD GLUCOSE          =======================  MEDICATIONS  ===================  MEDICATIONS  (STANDING):  apixaban 2.5 milliGRAM(s) Oral every 12 hours  docusate sodium Liquid 100 milliGRAM(s) Oral three times a day  dornase selina Solution 2.5 milliGRAM(s) Inhalation daily  famotidine    Tablet 20 milliGRAM(s) Oral daily  fluconAZOLE IVPB      fluconAZOLE IVPB 200 milliGRAM(s) IV Intermittent every 24 hours  ipratropium    for Nebulization 500 MICROGram(s) Nebulizer every 6 hours  levalbuterol Inhalation 0.63 milliGRAM(s) Inhalation every 6 hours  medium chain triglycerides Oil 15 milliLiter(s) Oral three times a day  meropenem  IVPB 1000 milliGRAM(s) IV Intermittent every 8 hours  metoprolol tartrate 25 milliGRAM(s) Oral every 8 hours  octreotide  Infusion 60 MICROgram(s)/Hr (12 mL/Hr) IV Continuous <Continuous>  potassium phosphate / sodium phosphate powder 1 Packet(s) Oral four times a day  potassium phosphate / sodium phosphate powder 1 Packet(s) Oral every 6 hours    MEDICATIONS  (PRN):  melatonin 3 milliGRAM(s) Oral at bedtime PRN Sleep  oxyCODONE    Solution 5 milliGRAM(s) Oral every 4 hours PRN Moderate Pain (4 - 6)      ======================VENTILATOR SETTINGS  ==============      =================== PATIENT CARE ACCESS DEVICES ==========  Peripheral IV  Central Venous Line	R	L	IJ	Fem	SC			Placed:   Arterial Line	R	L	PT	DP	Fem	Rad	Ax	Placed:   Midline:				  Urinary Catheter, Date Placed:   Necessity of urinary, arterial, and venous catheters discussed    ======================= PHYSICAL EXAM===================  General:                         Comfortable, Awake, alert, not in any distress  Neuro:                            Moving all extremities to commands. No focal deficits	  HEENT:                           ELE/ ETT/ NGT/ trach  Respiratory:	Lungs clear on auscultation bilaterally with good aeration.                                           No rales, rhonchi, no wheezing. Effort even and unlabored.  CV:		Regular rate and rhythm. Normal S1/S2. No murmurs  Abdomen:	                     Soft,  nontender, not-distended. Bowel sounds present / absent.   Skin:		No rash.  Extremities:	Warm, no cyanosis or edema.  Palpable pulses    ============================ LABS =======================                        11.7   9.55  )-----------( 476      ( 10 Sep 2018 03:00 )             33.8     09-10    139  |  99  |  18  ----------------------------<  101<H>  4.3   |  29  |  0.38<L>    Ca    8.8      10 Sep 2018 03:00  Phos  2.3     09-10  Mg     2.1     09-10    TPro  5.8<L>  /  Alb  2.5<L>  /  TBili  0.3  /  DBili  x   /  AST  25  /  ALT  19  /  AlkPhos  120  09-09    LIVER FUNCTIONS - ( 09 Sep 2018 13:13 )  Alb: 2.5 g/dL / Pro: 5.8 g/dL / ALK PHOS: 120 u/L / ALT: 19 u/L / AST: 25 u/L / GGT: x           PT/INR - ( 10 Sep 2018 03:00 )   PT: 13.0 SEC;   INR: 1.17          PTT - ( 10 Sep 2018 03:00 )  PTT:23.8 SEC      BLOOD VENOUS  09-04-18 --  --  --      BRONCHIAL LAVAGE  08-31-18 --  --  Serratia marcescens      PLEURAL FLUID  08-31-18 --  --    NOS^No Organisms Seen  WBC^White Blood Cells  QNTY CELLS IN GRAM STAIN: FEW (2+)      BRONCHIAL LAVAGE  08-30-18 --  --  Serratia marcescens          ===================== IMAGING STUDIES ===================  Radiology personally reviewed.    ====================ASSESSMENT AND PLAN ================      ====================== NEUROLOGY=======================  Pain control with PCA / PCEA / Tylenol IV / Toradol / Percocet  Pt is on Precedex for agitation  Pt is sedated with Propofol / Fentanyl    ==================== RESPIRATORY========================  Pt is on            L nasal canula / Face tent____% FiO2  Comfortable, no evidence of distress.  Using incentive spirometry & doing                ml  Monitor chest tube output  Chest tube to suction / water seal	    Mechanical Ventilation:    Mechanical ventilator status assessed & settings reviewed  Continue bronchodilators, pulmonary toilet  Head of bed elevation to 30-40 degrees    ====================CARDIOVASCULAR=====================  Continue hemodynamic monitoring/ telemetry  Not on any pressors  Continue cardiovascular / antihypertensive medications    ===================== RENAL ============================  Continue LR 30CC/hr      D/C IVF  Monitor I/Os, BUN/ Cr  and electrolytes  D/C Soriano      Keep Soriano for UO monitoring  BPH: Continue Flomax/ Finasteride      ==================== GASTROINTESTINAL===================  On regular diet, tolerating well  Continue GI prophylaxis with Pepcid / Protonix  Continue Zofran / Reglan for nausea - PRN	  NPO    =======================    ENDOCRIN  =====================  Glycemic monitoring  F/S with coverage  ===================HEMATOLOGIC/ONCOLOGIC =============  Monitor chest tube output. No signs of active bleeding.   Follow CBC, coags  in AM  DVT prophylaxis with SCD, sc Heparin    ========================INFECTIOUS DISEASE===============  No signs of infection. Monitor for fever / leukocytosis.  All surgical incision / chest tube  sites look clean  D/C Soriano      Pertinent clinical, laboratory, radiographic, hemodynamic, echocardiographic, respiratory data, microbiologic data and chart were reviewed and analyzed frequently throughout the course of the day and night. GI and DVT prophylaxis, glycemic control, head of bed elevation and skin care issues were addressed.  Patient seen, examined and plan discussed with CT Surgery / CTICU team during rounds.  Pt remains critically ill in imminent risk of  deterioration and requires very careful cardio- pulmonary monitoring and support.    I have spent               minutes of critical care time with this pt between            am/pm    and               am/ pm         minutes spent on total encounter; more than 50% of the visit was spent counseling and/or coordinating care by the attending physician.        ALVARO Snowden MD LIAM VELOZ          [id number]    HPI  83 y.o. female with hx of COPD, c/o SOB, dyspnea on exertion, fatigue, weight loss of 15 lbs in last 6 months, reports occasional nonproductive cough, s/p CXR, followed by CT scan, reports multiple right lung nodules noted admitted  for lung resection.      Procedure:  RVATS /   Right upper lobectomy   08/27/2018                   Bronchoscopy 8/30, 08/31, 09/01/18        Issues:   Aspiration Pneumonia  Atelectasis/ small right pleural effusion  Shortness of breath / Bronchospasm / COPD exacerbation  postop SVT / a-FIB with pauses              Postop pain              HTN      Interval/Overnight Events/ ROS  No events overnight except for mildly elevated BP responsive to Lopressor.  Yesterday OOB to chair, breathing comfortably with minimal pain. Ambulated several times on hallway with assistance. . Denies SOB, no palpitations, no nausea/ no vomiting, no dizziness.     PAST MEDICAL & SURGICAL HISTORY:  Lung nodules  COPD (Chronic Obstructive Pulmonary Disease): diagnosed in ~2013  Colon Polyps  History of Osteoporosis  History of Cataract Surgery: left eye  Hip Replacement: bilateral                 Home Medications:  Advair Diskus 250 mcg-50 mcg inhalation powder: 1 puff(s) inhaled 2 times a day (27 Aug 2018 12:42)  Spiriva 18 mcg inhalation capsule: 1 cap(s) inhaled once a day (27 Aug 2018 12:42)    Allergies  No Known Allergies      ***VITAL SIGNS:  Vital Signs Last 24 Hrs  T(C): 36.6 (10 Sep 2018 04:00), Max: 36.9 (09 Sep 2018 12:00)  T(F): 97.8 (10 Sep 2018 04:00), Max: 98.4 (09 Sep 2018 12:00)  HR: 67 (10 Sep 2018 03:00) (64 - 78)  BP: 143/60 (10 Sep 2018 03:00) (114/555 - 168/57)  BP(mean): 80 (10 Sep 2018 03:00) (68 - 777)  RR: 22 (10 Sep 2018 03:00) (20 - 28)  SpO2: 96% (10 Sep 2018 03:00) (91% - 99%)    I/Os:   I&O's Detail    08 Sep 2018 07:01  -  09 Sep 2018 07:00  --------------------------------------------------------  IN:    Enteral Tube Flush: 240 mL    IV PiggyBack: 562.5 mL    octreotide  Infusion: 288 mL    Vivonex RTH: 1320 mL  Total IN: 2410.5 mL    OUT:    Indwelling Catheter - Urethral: 2030 mL  Total OUT: 2030 mL    Total NET: 380.5 mL      09 Sep 2018 07:01  -  10 Sep 2018 04:49  --------------------------------------------------------  IN:    Enteral Tube Flush: 150 mL    IV PiggyBack: 350 mL    octreotide  Infusion: 252 mL    Vivonex RTH: 880 mL  Total IN: 1632 mL    OUT:    Indwelling Catheter - Urethral: 2110 mL  Total OUT: 2110 mL    Total NET: -478 mL          CAPILLARY BLOOD GLUCOSE          =======================  MEDICATIONS  ===================  MEDICATIONS  (STANDING):  apixaban 2.5 milliGRAM(s) Oral every 12 hours  docusate sodium Liquid 100 milliGRAM(s) Oral three times a day  dornase selina Solution 2.5 milliGRAM(s) Inhalation daily  famotidine    Tablet 20 milliGRAM(s) Oral daily  fluconAZOLE IVPB      fluconAZOLE IVPB 200 milliGRAM(s) IV Intermittent every 24 hours  ipratropium    for Nebulization 500 MICROGram(s) Nebulizer every 6 hours  levalbuterol Inhalation 0.63 milliGRAM(s) Inhalation every 6 hours  medium chain triglycerides Oil 15 milliLiter(s) Oral three times a day  meropenem  IVPB 1000 milliGRAM(s) IV Intermittent every 8 hours  metoprolol tartrate 25 milliGRAM(s) Oral every 8 hours  octreotide  Infusion 60 MICROgram(s)/Hr (12 mL/Hr) IV Continuous <Continuous>  potassium phosphate / sodium phosphate powder 1 Packet(s) Oral four times a day  potassium phosphate / sodium phosphate powder 1 Packet(s) Oral every 6 hours    MEDICATIONS  (PRN):  melatonin 3 milliGRAM(s) Oral at bedtime PRN Sleep  oxyCODONE    Solution 5 milliGRAM(s) Oral every 4 hours PRN Moderate Pain (4 - 6)      ======================VENTILATOR SETTINGS  ==============      =================== PATIENT CARE ACCESS DEVICES ==========  Peripheral IV  Central Venous Line	R	L	IJ	Fem	SC			Placed:   Arterial Line	R	L	PT	DP	Fem	Rad	Ax	Placed:   Midline:				  Urinary Catheter, Date Placed:   Necessity of urinary, arterial, and venous catheters discussed    ======================= PHYSICAL EXAM===================  General:                         Comfortable, Awake, alert, not in any distress  Neuro:                            Moving all extremities to commands. No focal deficits	  HEENT:                           ELE/ ETT/ NGT/ trach  Respiratory:	Lungs clear on auscultation bilaterally with good aeration.                                           No rales, rhonchi, no wheezing. Effort even and unlabored.  CV:		Regular rate and rhythm. Normal S1/S2. No murmurs  Abdomen:	                     Soft,  nontender, not-distended. Bowel sounds present / absent.   Skin:		No rash.  Extremities:	Warm, no cyanosis or edema.  Palpable pulses    ============================ LABS =======================                        11.7   9.55  )-----------( 476      ( 10 Sep 2018 03:00 )             33.8     09-10    139  |  99  |  18  ----------------------------<  101<H>  4.3   |  29  |  0.38<L>    Ca    8.8      10 Sep 2018 03:00  Phos  2.3     09-10  Mg     2.1     09-10    TPro  5.8<L>  /  Alb  2.5<L>  /  TBili  0.3  /  DBili  x   /  AST  25  /  ALT  19  /  AlkPhos  120  09-09    LIVER FUNCTIONS - ( 09 Sep 2018 13:13 )  Alb: 2.5 g/dL / Pro: 5.8 g/dL / ALK PHOS: 120 u/L / ALT: 19 u/L / AST: 25 u/L / GGT: x           PT/INR - ( 10 Sep 2018 03:00 )   PT: 13.0 SEC;   INR: 1.17          PTT - ( 10 Sep 2018 03:00 )  PTT:23.8 SEC      BLOOD VENOUS  09-04-18 --  --  --      BRONCHIAL LAVAGE  08-31-18 --  --  Serratia marcescens      PLEURAL FLUID  08-31-18 --  --    NOS^No Organisms Seen  WBC^White Blood Cells  QNTY CELLS IN GRAM STAIN: FEW (2+)      BRONCHIAL LAVAGE  08-30-18 --  --  Serratia marcescens          ===================== IMAGING STUDIES ===================  Radiology personally reviewed.    ====================ASSESSMENT AND PLAN ================      ====================== NEUROLOGY=======================  Pain control with PCA / PCEA / Tylenol IV / Toradol / Percocet  Pt is on Precedex for agitation  Pt is sedated with Propofol / Fentanyl    ==================== RESPIRATORY========================  Pt is on            L nasal canula / Face tent____% FiO2  Comfortable, no evidence of distress.  Using incentive spirometry & doing                ml  Monitor chest tube output  Chest tube to suction / water seal	    Mechanical Ventilation:    Mechanical ventilator status assessed & settings reviewed  Continue bronchodilators, pulmonary toilet  Head of bed elevation to 30-40 degrees    ====================CARDIOVASCULAR=====================  Continue hemodynamic monitoring/ telemetry  Not on any pressors  Continue cardiovascular / antihypertensive medications    ===================== RENAL ============================  Continue LR 30CC/hr      D/C IVF  Monitor I/Os, BUN/ Cr  and electrolytes  D/C Balderrama      Keep Balderrama for UO monitoring  BPH: Continue Flomax/ Finasteride      ==================== GASTROINTESTINAL===================  On regular diet, tolerating well  Continue GI prophylaxis with Pepcid / Protonix  Continue Zofran / Reglan for nausea - PRN	  NPO    =======================    ENDOCRIN  =====================  Glycemic monitoring  F/S with coverage  ===================HEMATOLOGIC/ONCOLOGIC =============  Monitor chest tube output. No signs of active bleeding.   Follow CBC, coags  in AM  DVT prophylaxis with SCD, sc Heparin    ========================INFECTIOUS DISEASE===============  No signs of infection. Monitor for fever / leukocytosis.  All surgical incision / chest tube  sites look clean  D/C Balderrama      Pertinent clinical, laboratory, radiographic, hemodynamic, echocardiographic, respiratory data, microbiologic data and chart were reviewed and analyzed frequently throughout the course of the day and night. GI and DVT prophylaxis, glycemic control, head of bed elevation and skin care issues were addressed.  Patient seen, examined and plan discussed with CT Surgery / CTICU team during rounds.  Pt remains critically ill in imminent risk of  deterioration and requires very careful cardio- pulmonary monitoring and support.    I have spent               minutes of critical care time with this pt between            am/pm    and               am/ pm         minutes spent on total encounter; more than 50% of the visit was spent counseling and/or coordinating care by the attending physician.        ALVARO Snowden MD                      PAST MEDICAL & SURGICAL HISTORY:  Lung nodules  COPD (Chronic Obstructive Pulmonary Disease): diagnose in ~2013  Colon Polyps  History of Osteoporosis  History of Cataract Surgery: left eye  Hip Replacement: bilateral        ICU Vital Signs Last 24 Hrs  T(C): 36.5 (09 Sep 2018 04:00), Max: 37 (08 Sep 2018 16:00)  T(F): 97.7 (09 Sep 2018 04:00), Max: 98.6 (08 Sep 2018 16:00)  HR: 74 (09 Sep 2018 05:06) (63 - 80)  BP: 145/66 (09 Sep 2018 05:00) (114/56 - 156/63)  BP(mean): 84 (09 Sep 2018 05:00) (66 - 121)  ABP: --  ABP(mean): --  RR: 23 (09 Sep 2018 05:00) (17 - 26)  SpO2: 95% (09 Sep 2018 05:06) (92% - 100%)    I&O's Detail    07 Sep 2018 07:01  -  08 Sep 2018 07:00  --------------------------------------------------------  IN:    Enteral Tube Flush: 180 mL    IV PiggyBack: 225 mL    octreotide  Infusion: 288 mL    Vivonex RTH: 1320 mL  Total IN: 2013 mL    OUT:    Indwelling Catheter - Urethral: 1290 mL  Total OUT: 1290 mL    Total NET: 723 mL      08 Sep 2018 07:01  -  09 Sep 2018 05:57  --------------------------------------------------------  IN:    Enteral Tube Flush: 210 mL    IV PiggyBack: 350 mL    octreotide  Infusion: 264 mL    Vivonex RTH: 1210 mL  Total IN: 2034 mL    OUT:    Indwelling Catheter - Urethral: 1905 mL  Total OUT: 1905 mL    Total NET: 129 mL        CAPILLARY BLOOD GLUCOSE          Home Medications:  Advair Diskus 250 mcg-50 mcg inhalation powder: 1 puff(s) inhaled 2 times a day (27 Aug 2018 12:42)  Spiriva 18 mcg inhalation capsule: 1 cap(s) inhaled once a day (27 Aug 2018 12:42)      MEDICATIONS  (STANDING):  acetaminophen  IVPB. 1000 milliGRAM(s) IV Intermittent once  apixaban 2.5 milliGRAM(s) Oral every 12 hours  docusate sodium Liquid 100 milliGRAM(s) Oral three times a day  dornase selina Solution 2.5 milliGRAM(s) Inhalation daily  famotidine    Tablet 20 milliGRAM(s) Oral daily  fluconAZOLE IVPB      fluconAZOLE IVPB 200 milliGRAM(s) IV Intermittent every 24 hours  heparin  Injectable 5000 Unit(s) SubCutaneous every 8 hours  ipratropium    for Nebulization 500 MICROGram(s) Nebulizer every 6 hours  levalbuterol Inhalation 0.63 milliGRAM(s) Inhalation every 6 hours  medium chain triglycerides Oil 15 milliLiter(s) Oral three times a day  meropenem  IVPB 1000 milliGRAM(s) IV Intermittent every 8 hours  metoprolol tartrate 25 milliGRAM(s) Oral every 8 hours  octreotide  Infusion 60 MICROgram(s)/Hr (12 mL/Hr) IV Continuous <Continuous>    MEDICATIONS  (PRN):  acetaminophen  IVPB .. 1000 milliGRAM(s) IV Intermittent once PRN Mild Pain (1 - 3), Moderate Pain (4 - 6)  diphenhydrAMINE   Injectable 25 milliGRAM(s) IV Push once PRN sleep  ketorolac   Injectable 15 milliGRAM(s) IV Push every 6 hours PRN Moderate Pain (4 - 6)  melatonin 3 milliGRAM(s) Oral at bedtime PRN Sleep  oxyCODONE    Solution 5 milliGRAM(s) Oral every 4 hours PRN Moderate Pain (4 - 6)          Physical exam:   General:               Pt is awake, appears to be fairly comfortable and  not in any distress                                                  Neuro:                  Nonfocal                             Cardiovascular:     S1 & S2, regular / irregular                          Respiratory:         Air entry is slightly decreased at right base, has bilateral rhonchi /  conducted sounds                           GI:                         Soft, nondistended and nontender, Bowel sounds active                            Ext:                        No cyanosis or edema                            Labs:                                                                           11.4   12.45 )-----------( 436      ( 09 Sep 2018 04:20 )             34.4             09-09    138  |  97<L>  |  20  ----------------------------<  141<H>  3.7   |  32<H>  |  0.40<L>    Ca    9.2      09 Sep 2018 04:20  Phos  2.0     09-09  Mg     2.0     09-09    TPro  5.9<L>  /  Alb  2.6<L>  /  TBili  0.3  /  DBili  x   /  AST  17  /  ALT  16  /  AlkPhos  121<H>  09-09                    LIVER FUNCTIONS - ( 09 Sep 2018 04:20 )  Alb: 2.6 g/dL / Pro: 5.9 g/dL / ALK PHOS: 121 u/L / ALT: 16 u/L / AST: 17 u/L / GGT: x               CXR:    < from: Xray Chest 1 View- PORTABLE-Routine (09.08.18 @ 06:23) >  Small right pleural effusion with adjacent atelectasis and/or pneumonia   is unchanged.          Plan:    General: 83yFemale s/p RVATS /   Right upper lobectomy   08/27/2018 experiencing  pain with deep breathing. Postop period is significant for SVT / A-Fib, atelectasis of right lung, pneumonia requiring bronchoscopy multiple times.                             Neuro:                                         Pain control with  Tylenol only, avoid narcotics                            Cardiovascular:                                          Continue hemodynamic monitoring.    SVT / A-fb: Continue Lopressor 25mg q8hrs, increase as tolerated &  no more pauses. Started Eliquis for anticoagulation    HTN:  Increase lopressor as tolerated                            Respiratory:                                              Pt is on 2L  nasal canula,wean off as tolerated       Rt lower lobe Serratia pneumonia: On Meropenem  / Diflucan,  to be completed on 09/12     Atelectasis - Aggressive pulmonary toilet + Pulmozyme + Saline inhalations                                         mild SOB, not in any distress.                                         Using incentive spirometry  / 500cc                                          Chest tube d/cd                                                           COPD: Continue  Xopenex / Atrovent nebulizer Rx.                             GI                                         Chylothorax ?  - On Vivenex + MCT oil. Currently on Octreotide drip     NPO  with tube feeds                                         Continue GI prophylaxis with  Protonix                                         Continue Zofran / Reglan for nausea - PRN	                                                                 Renal:                                         Gentle diuresis                                         Monitor I/Os and electrolytes                                                                                        Hem/ Onc:                                                                                  Monitor for signs of bleeding.                                          Follow CBC in AM                           Infectious disease:      Rt lower pneumonia: On Meropenem  / Diflucan. WBC coming down       Monitor for fever / leukocytosis.                                         All surgical incision / chest tube  sites look clean                            Endocrine                                             Continue Accu-Checks with coverage    Pt is on full anticoagulation with Eliquis and Venodyne boots for DVT prophylaxis.     Pertinent clinical, laboratory, radiographic, hemodynamic, echocardiographic, respiratory data, microbiologic data and chart were reviewed and analyzed frequently throughout the course of the day and night  Patient seen, examined and plan discussed with CT Surgeon Dr. Gallegos / CTICU team during rounds.    Pt's status discussed with family at bedside, updated status     is aware of possible  rehab placement next week LIAM VELOZ          [id number]    HPI  83 y.o. female with hx of COPD, c/o SOB, dyspnea on exertion, fatigue, weight loss of 15 lbs in last 6 months, reports occasional nonproductive cough, s/p CXR, followed by CT scan, reports multiple right lung nodules noted admitted  for lung resection.    Procedure:  RVATS /   Right upper lobectomy   08/27/2018                   Bronchoscopy 8/30, 08/31, 09/01/18                    PEG 9/05/18  Issues:   Aspiration Pneumonia  Atelectasis/ small right pleural effusion  Shortness of breath / Bronchospasm / COPD exacerbation  postop SVT / a-FIB with pauses              Postop pain              HTN              Dyssphagia              hypophosphatemia    Interval/Overnight Events/ ROS  No events overnight except for mildly elevated BP responsive to Lopressor.  Yesterday OOB to chair, breathing comfortably with minimal pain. Ambulated several times on hallway with assistance. . Denies SOB, no palpitations, no nausea/ no vomiting, no dizziness.       PAST MEDICAL & SURGICAL HISTORY:  Lung nodules  COPD (Chronic Obstructive Pulmonary Disease): diagnosed in ~2013  Colon Polyps  History of Osteoporosis  History of Cataract Surgery: left eye  Hip Replacement: bilateral               Home Medications:  Advair Diskus 250 mcg-50 mcg inhalation powder: 1 puff(s) inhaled 2 times a day (27 Aug 2018 12:42)  Spiriva 18 mcg inhalation capsule: 1 cap(s) inhaled once a day (27 Aug 2018 12:42)    Allergies  No Known Allergies      ***VITAL SIGNS:  Vital Signs Last 24 Hrs  T(C): 36.6 (10 Sep 2018 04:00), Max: 36.9 (09 Sep 2018 12:00)  T(F): 97.8 (10 Sep 2018 04:00), Max: 98.4 (09 Sep 2018 12:00)  HR: 67 (10 Sep 2018 03:00) (64 - 78)  BP: 143/60 (10 Sep 2018 03:00) (114/555 - 168/57)  BP(mean): 80 (10 Sep 2018 03:00) (68 - 777)  RR: 22 (10 Sep 2018 03:00) (20 - 28)  SpO2: 96% (10 Sep 2018 03:00) (91% - 99%)    I/Os:   I&O's Detail    08 Sep 2018 07:01  -  09 Sep 2018 07:00  --------------------------------------------------------  IN:    Enteral Tube Flush: 240 mL    IV PiggyBack: 562.5 mL    octreotide  Infusion: 288 mL    Vivonex RTH: 1320 mL  Total IN: 2410.5 mL    OUT:    Indwelling Catheter - Urethral: 2030 mL  Total OUT: 2030 mL    Total NET: 380.5 mL      09 Sep 2018 07:01  -  10 Sep 2018 04:49  --------------------------------------------------------  IN:    Enteral Tube Flush: 150 mL    IV PiggyBack: 350 mL    octreotide  Infusion: 252 mL    Vivonex RTH: 880 mL  Total IN: 1632 mL    OUT:    Indwelling Catheter - Urethral: 2110 mL  Total OUT: 2110 mL    Total NET: -478 mL  =======================  MEDICATIONS  ===================  MEDICATIONS  (STANDING):  apixaban 2.5 milliGRAM(s) Oral every 12 hours  docusate sodium Liquid 100 milliGRAM(s) Oral three times a day  dornase selina Solution 2.5 milliGRAM(s) Inhalation daily  famotidine    Tablet 20 milliGRAM(s) Oral daily  fluconAZOLE IVPB      fluconAZOLE IVPB 200 milliGRAM(s) IV Intermittent every 24 hours  ipratropium    for Nebulization 500 MICROGram(s) Nebulizer every 6 hours  levalbuterol Inhalation 0.63 milliGRAM(s) Inhalation every 6 hours  medium chain triglycerides Oil 15 milliLiter(s) Oral three times a day  meropenem  IVPB 1000 milliGRAM(s) IV Intermittent every 8 hours  metoprolol tartrate 25 milliGRAM(s) Oral every 8 hours  octreotide  Infusion 60 MICROgram(s)/Hr (12 mL/Hr) IV Continuous <Continuous>  potassium phosphate / sodium phosphate powder 1 Packet(s) Oral four times a day  potassium phosphate / sodium phosphate powder 1 Packet(s) Oral every 6 hours    MEDICATIONS  (PRN):  melatonin 3 milliGRAM(s) Oral at bedtime PRN Sleep  oxyCODONE    Solution 5 milliGRAM(s) Oral every 4 hours PRN Moderate Pain (4 - 6)    =================== PATIENT CARE ACCESS DEVICES ==========  Peripheral IV(+)			  Urinary Catheter,(+)     ======================= PHYSICAL EXAM===================  General:            Appears deconditioned, Comfortable, Awake, alert, not in any distress  Neuro:              Moving all extremities to commands. No focal deficits	  HEENT:                           ELE  Respiratory:	Slightly decreased breath sounds on right lower lung field                            No rales, few  rhonchi, no wheezing. Effort even and unlabored.                           Right chest surgical site - clean  CV:		Regular rate and rhythm. Normal S1/S2.   Abdomen:         Soft,  nontender, not-distended. Bowel sounds present ; PEG site - clean  Skin:		No rash.  Extremities:	Warm, no cyanosis or edema.  Palpable pulses    ============================ LABS =======================                        11.7   9.55  )-----------( 476      ( 10 Sep 2018 03:00 )             33.8     09-10    139  |  99  |  18  ----------------------------<  101<H>  4.3   |  29  |  0.38<L>    Ca    8.8      10 Sep 2018 03:00  Phos  2.3     09-10  Mg     2.1     09-10    TPro  5.8<L>  /  Alb  2.5<L>  /  TBili  0.3  /  DBili  x   /  AST  25  /  ALT  19  /  AlkPhos  120  09-09          PT/INR - ( 10 Sep 2018 03:00 )   PT: 13.0 SEC;   INR: 1.17   )  PTT:23.8 SEC      BLOOD VENOUS  09-04-18 --  --  --    BRONCHIAL LAVAGE  08-31-18 --  --  Serratia marcescens    PLEURAL FLUID  08-31-18 --  --    NOS^No Organisms Seen  WBC^White Blood Cells  QNTY CELLS IN GRAM STAIN: FEW (2+)      BRONCHIAL LAVAGE  08-30-18 --  --  Serratia marcescens      ===================== IMAGING STUDIES ===================  Radiology personally reviewed.  < from: Xray Chest 1 View- PORTABLE-Routine (09.09.18 @ 07:56) >  ICLINICAL INDICATION: Pleural effusion / Aspiration pneumonia.    FINDINGS:     Cardiac silhouette not well evaluated. Small right pleural effusion with   adjacent opacity is unchanged. Lungs otherwise clear. No pneumothorax.    IMPRESSION:   No change from prior study.    < end of copied text >    ====================ASSESSMENT AND PLAN ================  83 yFemale s/p RVATS /   Right upper lobectomy   08/27/2018, experiencing  pain with deep breathing. Postop period is significant for SVT / A-Fib, atelectasis of right lung, pneumonia requiring  multiple bronchoscopies, dysphagia requiring PEG placement    ====================== NEUROLOGY=======================  Pain control with PCA / PCEA / Tylenol IV / Toradol / Percocet  Pt is on Precedex for agitation  Pt is sedated with Propofol / Fentanyl    ==================== RESPIRATORY========================  Pt is on            L nasal canula / Face tent____% FiO2  Comfortable, no evidence of distress.  Using incentive spirometry & doing                ml  Monitor chest tube output  Chest tube to suction / water seal	    Mechanical Ventilation:    Mechanical ventilator status assessed & settings reviewed  Continue bronchodilators, pulmonary toilet  Head of bed elevation to 30-40 degrees    ====================CARDIOVASCULAR=====================  Continue hemodynamic monitoring/ telemetry  Not on any pressors  Continue cardiovascular / antihypertensive medications    ===================== RENAL ============================  Continue LR 30CC/hr      D/C IVF  Monitor I/Os, BUN/ Cr  and electrolytes  D/C Balderrama      Keep Balderrama for UO monitoring  BPH: Continue Flomax/ Finasteride      ==================== GASTROINTESTINAL===================  On regular diet, tolerating well  Continue GI prophylaxis with Pepcid / Protonix  Continue Zofran / Reglan for nausea - PRN	  NPO    =======================    ENDOCRIN  =====================  Glycemic monitoring  F/S with coverage  ===================HEMATOLOGIC/ONCOLOGIC =============  Monitor chest tube output. No signs of active bleeding.   Follow CBC, coags  in AM  DVT prophylaxis with SCD, sc Heparin    ========================INFECTIOUS DISEASE===============  No signs of infection. Monitor for fever / leukocytosis.  All surgical incision / chest tube  sites look clean  D/C Balderrama      Pertinent clinical, laboratory, radiographic, hemodynamic, echocardiographic, respiratory data, microbiologic data and chart were reviewed and analyzed frequently throughout the course of the day and night. GI and DVT prophylaxis, glycemic control, head of bed elevation and skin care issues were addressed.  Patient seen, examined and plan discussed with CT Surgery / CTICU team during rounds.  Pt remains critically ill in imminent risk of  deterioration and requires very careful cardio- pulmonary monitoring and support.    I have spent               minutes of critical care time with this pt between            am/pm    and               am/ pm         minutes spent on total encounter; more than 50% of the visit was spent counseling and/or coordinating care by the attending physician.        ALVARO Snowden MD                          Physical exam:   General:               Pt is awake, appears to be fairly comfortable and  not in any distress                                                  Neuro:                  Nonfocal                             Cardiovascular:     S1 & S2, regular / irregular                          Respiratory:         Air entry is slightly decreased at right base, has bilateral rhonchi /  conducted sounds                           GI:                         Soft, nondistended and nontender, Bowel sounds active                            Ext:                        No cyanosis or edema                            Labs:                                                                           11.4   12.45 )-----------( 436      ( 09 Sep 2018 04:20 )             34.4             09-09    138  |  97<L>  |  20  ----------------------------<  141<H>  3.7   |  32<H>  |  0.40<L>    Ca    9.2      09 Sep 2018 04:20  Phos  2.0     09-09  Mg     2.0     09-09    TPro  5.9<L>  /  Alb  2.6<L>  /  TBili  0.3  /  DBili  x   /  AST  17  /  ALT  16  /  AlkPhos  121<H>  09-09                    LIVER FUNCTIONS - ( 09 Sep 2018 04:20 )  Alb: 2.6 g/dL / Pro: 5.9 g/dL / ALK PHOS: 121 u/L / ALT: 16 u/L / AST: 17 u/L / GGT: x               CXR:    < from: Xray Chest 1 View- PORTABLE-Routine (09.08.18 @ 06:23) >  Small right pleural effusion with adjacent atelectasis and/or pneumonia   is unchanged.          Plan:    General: 83yFemale s/p RVATS /   Right upper lobectomy   08/27/2018 experiencing  pain with deep breathing. Postop period is significant for SVT / A-Fib, atelectasis of right lung, pneumonia requiring bronchoscopy multiple times.                             Neuro:                                         Pain control with  Tylenol only, avoid narcotics                            Cardiovascular:                                          Continue hemodynamic monitoring.    SVT / A-fb: Continue Lopressor 25mg q8hrs, increase as tolerated &  no more pauses. Started Eliquis for anticoagulation    HTN:  Increase lopressor as tolerated                            Respiratory:                                              Pt is on 2L  nasal canula,wean off as tolerated       Rt lower lobe Serratia pneumonia: On Meropenem  / Diflucan,  to be completed on 09/12     Atelectasis - Aggressive pulmonary toilet + Pulmozyme + Saline inhalations                                         mild SOB, not in any distress.                                         Using incentive spirometry  / 500cc                                          Chest tube d/cd                                                           COPD: Continue  Xopenex / Atrovent nebulizer Rx.                             GI                                         Chylothorax ?  - On Vivenex + MCT oil. Currently on Octreotide drip     NPO  with tube feeds                                         Continue GI prophylaxis with  Protonix                                         Continue Zofran / Reglan for nausea - PRN	                                                                 Renal:                                         Gentle diuresis                                         Monitor I/Os and electrolytes                                                                                        Hem/ Onc:                                                                                  Monitor for signs of bleeding.                                          Follow CBC in AM                           Infectious disease:      Rt lower pneumonia: On Meropenem  / Diflucan. WBC coming down       Monitor for fever / leukocytosis.                                         All surgical incision / chest tube  sites look clean                            Endocrine                                             Continue Accu-Checks with coverage    Pt is on full anticoagulation with Eliquis and Venodyne boots for DVT prophylaxis.     Pertinent clinical, laboratory, radiographic, hemodynamic, echocardiographic, respiratory data, microbiologic data and chart were reviewed and analyzed frequently throughout the course of the day and night  Patient seen, examined and plan discussed with CT Surgeon Dr. Gallegos / CTICU team during rounds.    Pt's status discussed with family at bedside, updated status     is aware of possible  rehab placement next week LIAM VELOZ          [id number]    HPI  83 y.o. female with hx of COPD, c/o SOB, dyspnea on exertion, fatigue, weight loss of 15 lbs in last 6 months, reports occasional nonproductive cough, s/p CXR, followed by CT scan, reports multiple right lung nodules noted admitted  for lung resection.    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  Yesterday OOB to chair, breathing comfortably with minimal pain. Ambulated several times on hallway with assistance. . Denies SOB, no palpitations, no nausea/ no vomiting, no dizziness.       PAST MEDICAL & SURGICAL HISTORY:  Lung nodules  COPD (Chronic Obstructive Pulmonary Disease): diagnosed in ~2013  Colon Polyps  History of Osteoporosis  History of Cataract Surgery: left eye  Hip Replacement: bilateral               Home Medications:  Advair Diskus 250 mcg-50 mcg inhalation powder: 1 puff(s) inhaled 2 times a day (27 Aug 2018 12:42)  Spiriva 18 mcg inhalation capsule: 1 cap(s) inhaled once a day (27 Aug 2018 12:42)    Allergies  No Known Allergies      ***VITAL SIGNS:  Vital Signs Last 24 Hrs  T(C): 36.6 (10 Sep 2018 04:00), Max: 36.9 (09 Sep 2018 12:00)  T(F): 97.8 (10 Sep 2018 04:00), Max: 98.4 (09 Sep 2018 12:00)  HR: 67 (10 Sep 2018 03:00) (64 - 78)  BP: 143/60 (10 Sep 2018 03:00) (114/555 - 168/57)  BP(mean): 80 (10 Sep 2018 03:00) (68 - 777)  RR: 22 (10 Sep 2018 03:00) (20 - 28)  SpO2: 96% (10 Sep 2018 03:00) (91% - 99%)    I/Os:   I&O's Detail    08 Sep 2018 07:01  -  09 Sep 2018 07:00  --------------------------------------------------------  IN:    Enteral Tube Flush: 240 mL    IV PiggyBack: 562.5 mL    octreotide  Infusion: 288 mL    Vivonex RTH: 1320 mL  Total IN: 2410.5 mL    OUT:    Indwelling Catheter - Urethral: 2030 mL  Total OUT: 2030 mL    Total NET: 380.5 mL      09 Sep 2018 07:01  -  10 Sep 2018 04:49  --------------------------------------------------------  IN:    Enteral Tube Flush: 150 mL    IV PiggyBack: 350 mL    octreotide  Infusion: 252 mL    Vivonex RTH: 880 mL  Total IN: 1632 mL    OUT:    Indwelling Catheter - Urethral: 2110 mL  Total OUT: 2110 mL    Total NET: -478 mL  =======================  MEDICATIONS  ===================  MEDICATIONS  (STANDING):  apixaban 2.5 milliGRAM(s) Oral every 12 hours  docusate sodium Liquid 100 milliGRAM(s) Oral three times a day  dornase selina Solution 2.5 milliGRAM(s) Inhalation daily  famotidine    Tablet 20 milliGRAM(s) Oral daily  fluconAZOLE IVPB      fluconAZOLE IVPB 200 milliGRAM(s) IV Intermittent every 24 hours  ipratropium    for Nebulization 500 MICROGram(s) Nebulizer every 6 hours  levalbuterol Inhalation 0.63 milliGRAM(s) Inhalation every 6 hours  medium chain triglycerides Oil 15 milliLiter(s) Oral three times a day  meropenem  IVPB 1000 milliGRAM(s) IV Intermittent every 8 hours  metoprolol tartrate 25 milliGRAM(s) Oral every 8 hours  octreotide  Infusion 60 MICROgram(s)/Hr (12 mL/Hr) IV Continuous <Continuous>  potassium phosphate / sodium phosphate powder 1 Packet(s) Oral four times a day  potassium phosphate / sodium phosphate powder 1 Packet(s) Oral every 6 hours    MEDICATIONS  (PRN):  melatonin 3 milliGRAM(s) Oral at bedtime PRN Sleep  oxyCODONE    Solution 5 milliGRAM(s) Oral every 4 hours PRN Moderate Pain (4 - 6)    =================== PATIENT CARE ACCESS DEVICES ==========  Peripheral IV(+)			  Urinary Catheter,(+)     ======================= PHYSICAL EXAM===================  General:            Appears deconditioned, Comfortable, Awake, alert, not in any distress  Neuro:              Moving all extremities to commands. No focal deficits	  HEENT:                           ELE  Respiratory:	Slightly decreased breath sounds on right lower lung field                            No rales, few  rhonchi, no wheezing. Effort even and unlabored.                           Right chest surgical site - clean  CV:		Regular rate and rhythm. Normal S1/S2.   Abdomen:         Soft,  nontender, not-distended. Bowel sounds present ; PEG site - clean  Skin:		No rash.  Extremities:	Warm, no cyanosis or edema.  Palpable pulses    ============================ LABS =======================                        11.7   9.55  )-----------( 476      ( 10 Sep 2018 03:00 )             33.8     09-10    139  |  99  |  18  ----------------------------<  101<H>  4.3   |  29  |  0.38<L>    Ca    8.8      10 Sep 2018 03:00  Phos  2.3     09-10  Mg     2.1     09-10    TPro  5.8<L>  /  Alb  2.5<L>  /  TBili  0.3  /  DBili  x   /  AST  25  /  ALT  19  /  AlkPhos  120  09-09          PT/INR - ( 10 Sep 2018 03:00 )   PT: 13.0 SEC;   INR: 1.17   )  PTT:23.8 SEC      BLOOD VENOUS  09-04-18 --  --  --    BRONCHIAL LAVAGE  08-31-18 --  --  Serratia marcescens    PLEURAL FLUID  08-31-18 --  --    NOS^No Organisms Seen  WBC^White Blood Cells  QNTY CELLS IN GRAM STAIN: FEW (2+)      BRONCHIAL LAVAGE  08-30-18 --  --  Serratia marcescens      ===================== IMAGING STUDIES ===================  Radiology personally reviewed.  < from: Xray Chest 1 View- PORTABLE-Routine (09.09.18 @ 07:56) >  ICLINICAL INDICATION: Pleural effusion / Aspiration pneumonia.    FINDINGS:     Cardiac silhouette not well evaluated. Small right pleural effusion with   adjacent opacity is unchanged. Lungs otherwise clear. No pneumothorax.    IMPRESSION:   No change from prior study.    < end of copied text >    ====================ASSESSMENT AND PLAN ================  83 yFemale s/p RVATS /   Right upper lobectomy   08/27/2018, experiencing  pain with deep breathing. 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)                   Bronchoscopy 8/30, 08/31, 09/01/18                    PEG 9/05/18  Issues:   Aspiration Pneumonia/+Serratia marcescens  Atelectasis/ small right pleural effusion  Shortness of breath / Bronchospasm / COPD exacerbation  postop SVT / a-FIB with pauses              Postop pain              HTN              Dysphagia              hypophosphatemia- supplemented    ====================== NEUROLOGY=======================  Pain control with Tylenol IV  Preferably no narcotics  ==================== RESPIRATORY========================  Pt is on   1-2  L nasal canula   Comfortable, no evidence of distress.  Using incentive spirometry   Continue bronchodilators, pulmonary toilet  dornase selina Solution 2.5 milliGRAM(s) Inhalation daily  ipratropium    for Nebulization 500 MICROGram(s) Nebulizer every 6 hours  levalbuterol Inhalation 0.63 milliGRAM(s) Inhalation every 6 hours  Head of bed elevation to 30-40 degrees  OOB, ambulation  Possible bronch today if CXR today not improving    ====================CARDIOVASCULAR=====================  Continue hemodynamic monitoring/ telemetry  Continue cardiovascular / antihypertensive medications  Postop SVT / A-fb: on  Lopressor 25mg q8hrs via PEG, increase as tolerated &  no more pauses.  Started Eliquis for anticoagulation 3 days ago as per EP recs ( on hold since yesterday for possible bronch today   HTN:  Increase lopressor as tolerated    ===================== RENAL ============================  Monitor I/Os, BUN/ Cr  and electrolytes  Keep Balderrama for UO monitoring- likely d/c later today    ==================== GASTROINTESTINAL===================  PEG feeds on hold since midnight for possible bronchoscopy today as d/w dr Gallegos  yesterday  Continue GI prophylaxis with Pepcid   Zofran / Reglan for nausea - PRN	  NPO  ? R  Chylothorax   - On Vivonex  + MCT oil and   Octreotide drip    =======================    ENDOCRIN  =====================  Glycemic monitoring  F/S with coverage  ===================HEMATOLOGIC/ONCOLOGIC =============   No signs of active bleeding.   Follow CBC, coags  in AM  DVT prophylaxis with SCD and AC with Eliquis fro SVT/Afib     ========================INFECTIOUS DISEASE===============  No signs of new infection. Monitor for fever / leukocytosis.  All surgical incision / chest tube  sites look clean  Likely D/C Balderrama later today   Rt lower lobe Serratia pneumonia and oral candidasis : On Meropenem  / Diflucan,  to be completed on 09/12      Pertinent clinical, laboratory, radiographic, hemodynamic, echocardiographic, respiratory data, microbiologic data and chart were reviewed and analyzed frequently throughout the course of the day and night. GI and DVT prophylaxis, glycemic control, head of bed elevation and skin care issues were addressed.  Patient seen, examined and plan discussed with CT Surgery / CTICU team during rounds.  Pt remains critically ill in imminent risk of  deterioration and requires very careful cardio- pulmonary monitoring and support.    I have spent   35 minutes of critical care time with this pt between   12  am   and      8  am         minutes spent on total encounter; more than 50% of the visit was spent counseling and/or coordinating care by the attending physician.    Pt's status discussed with family at bedside, updated status   is aware of possible  rehab placement  this  week    ALVARO Snowden MD LIAM VELOZ          [id number]    HPI  83 y.o. female with hx of COPD, c/o SOB, dyspnea on exertion, fatigue, weight loss of 15 lbs in last 6 months, reports occasional nonproductive cough, s/p CXR, followed by CT scan, reports multiple right lung nodules noted admitted  for lung resection.    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  Yesterday OOB to chair, breathing comfortably with minimal pain. Ambulated several times on hallway with assistance. . Denies SOB, no palpitations, no nausea/ no vomiting, no dizziness.     PAST MEDICAL & SURGICAL HISTORY:  Lung nodules  COPD (Chronic Obstructive Pulmonary Disease): diagnosed in ~2013  Colon Polyps  History of Osteoporosis  History of Cataract Surgery: left eye  Hip Replacement: bilateral               Home Medications:  Advair Diskus 250 mcg-50 mcg inhalation powder: 1 puff(s) inhaled 2 times a day (27 Aug 2018 12:42)  Spiriva 18 mcg inhalation capsule: 1 cap(s) inhaled once a day (27 Aug 2018 12:42)    Allergies  No Known Allergies    ***VITAL SIGNS:  Vital Signs Last 24 Hrs  T(C): 36.6 (10 Sep 2018 04:00), Max: 36.9 (09 Sep 2018 12:00)  T(F): 97.8 (10 Sep 2018 04:00), Max: 98.4 (09 Sep 2018 12:00)  HR: 67 (10 Sep 2018 03:00) (64 - 78)  BP: 143/60 (10 Sep 2018 03:00) (114/555 - 168/57)  BP(mean): 80 (10 Sep 2018 03:00) (68 - 777)  RR: 22 (10 Sep 2018 03:00) (20 - 28)  SpO2: 96% (10 Sep 2018 03:00) (91% - 99%)    I/Os:  08 Sep 2018 07:01  -  09 Sep 2018 07:00  --------------------------------------------------------  IN:    Enteral Tube Flush: 240 mL    IV PiggyBack: 562.5 mL    octreotide  Infusion: 288 mL    Vivonex RTH: 1320 mL  Total IN: 2410.5 mL    OUT:    Indwelling Catheter - Urethral: 2030 mL  Total OUT: 2030 mL    Total NET: 380.5 mL      09 Sep 2018 07:01  -  10 Sep 2018 04:49  --------------------------------------------------------  IN:    Enteral Tube Flush: 150 mL    IV PiggyBack: 350 mL    octreotide  Infusion: 252 mL    Vivonex RTH: 880 mL  Total IN: 1632 mL    OUT:    Indwelling Catheter - Urethral: 2110 mL  Total OUT: 2110 mL    Total NET: -478 mL  =======================  MEDICATIONS  ===================  MEDICATIONS  (STANDING):  apixaban 2.5 milliGRAM(s) Oral every 12 hours  docusate sodium Liquid 100 milliGRAM(s) Oral three times a day  dornase selina Solution 2.5 milliGRAM(s) Inhalation daily  famotidine    Tablet 20 milliGRAM(s) Oral daily  fluconAZOLE IVPB      fluconAZOLE IVPB 200 milliGRAM(s) IV Intermittent every 24 hours  ipratropium    for Nebulization 500 MICROGram(s) Nebulizer every 6 hours  levalbuterol Inhalation 0.63 milliGRAM(s) Inhalation every 6 hours  medium chain triglycerides Oil 15 milliLiter(s) Oral three times a day  meropenem  IVPB 1000 milliGRAM(s) IV Intermittent every 8 hours  metoprolol tartrate 25 milliGRAM(s) Oral every 8 hours  octreotide  Infusion 60 MICROgram(s)/Hr (12 mL/Hr) IV Continuous <Continuous>  potassium phosphate / sodium phosphate powder 1 Packet(s) Oral four times a day  potassium phosphate / sodium phosphate powder 1 Packet(s) Oral every 6 hours    MEDICATIONS  (PRN):  melatonin 3 milliGRAM(s) Oral at bedtime PRN Sleep  oxyCODONE    Solution 5 milliGRAM(s) Oral every 4 hours PRN Moderate Pain (4 - 6)    =================== PATIENT CARE ACCESS DEVICES ==========  Peripheral IV(+)			  Urinary Catheter,(+)   ======================= PHYSICAL EXAM===================  General:            Appears deconditioned, Comfortable, Awake, alert, not in any distress  Neuro:              Moving all extremities to commands. No focal deficits	  HEENT:                           ELE  Respiratory:	Slightly decreased breath sounds on right lower lung field                            No rales, few  rhonchi, no wheezing. Effort even and unlabored.                           Right chest surgical site - clean  CV:		Regular rate and rhythm. Normal S1/S2.   Abdomen:         Soft,  nontender, not-distended. Bowel sounds present ; PEG site - clean  Skin:		No rash.  Extremities:	Warm, no cyanosis or edema.  Palpable pulses    ============================ LABS =======================                        11.7   9.55  )-----------( 476      ( 10 Sep 2018 03:00 )             33.8     09-10    139  |  99  |  18  ----------------------------<  101<H>  4.3   |  29  |  0.38<L>    Ca    8.8      10 Sep 2018 03:00  Phos  2.3     09-10  Mg     2.1     09-10    TPro  5.8<L>  /  Alb  2.5<L>  /  TBili  0.3  /  DBili  x   /  AST  25  /  ALT  19  /  AlkPhos  120  09-09        PT/INR - ( 10 Sep 2018 03:00 )   PT: 13.0 SEC;   INR: 1.17   )  PTT:23.8 SEC    BLOOD VENOUS  09-04-18 --  --  --  BRONCHIAL LAVAGE  08-31-18 --  --  Serratia marcescens  PLEURAL FLUID  08-31-18 --  --    NOS^No Organisms Seen  WBC^White Blood Cells  QNTY CELLS IN GRAM STAIN: FEW (2+)    BRONCHIAL LAVAGE  08-30-18 --  --  Serratia marcescens    ===================== IMAGING STUDIES ===================  Radiology personally reviewed.  < from: Xray Chest 1 View- PORTABLE-Routine (09.09.18 @ 07:56) >  ICLINICAL INDICATION: Pleural effusion / Aspiration pneumonia.    FINDINGS:     Cardiac silhouette not well evaluated. Small right pleural effusion with   adjacent opacity is unchanged. Lungs otherwise clear. No pneumothorax.    IMPRESSION:   No change from prior study.    < end of copied text >    ====================ASSESSMENT AND PLAN ================  83 yFemale s/p RVATS /   Right upper lobectomy   08/27/2018, experiencing  pain with deep breathing. 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)                   Bronchoscopy 8/30, 08/31, 09/01/18                    PEG 9/05/18  Issues:   Aspiration Pneumonia/+Serratia marcescens  Atelectasis/ small right pleural effusion  Shortness of breath / Bronchospasm / COPD exacerbation  postop SVT / a-FIB with pauses              Postop pain              HTN              Dysphagia              hypophosphatemia- supplemented    ====================== NEUROLOGY=======================  Pain control with Tylenol IV  Preferably no narcotics  ==================== RESPIRATORY========================  Pt is on   1-2  L nasal canula   Comfortable, no evidence of distress.  Using incentive spirometry   Continue bronchodilators, pulmonary toilet  dornase selina Solution 2.5 milliGRAM(s) Inhalation daily  ipratropium    for Nebulization 500 MICROGram(s) Nebulizer every 6 hours  levalbuterol Inhalation 0.63 milliGRAM(s) Inhalation every 6 hours  Head of bed elevation to 30-40 degrees  OOB, ambulation  Possible bronch today if CXR today not improving    ====================CARDIOVASCULAR=====================  Continue hemodynamic monitoring/ telemetry  Continue cardiovascular / antihypertensive medications  Postop SVT / A-fb: on  Lopressor 25mg q8hrs via PEG, increase as tolerated &  no more pauses.  Started Eliquis for anticoagulation 3 days ago as per EP recs ( on hold since yesterday for possible bronch today   HTN:  Increase lopressor as tolerated    ===================== RENAL ============================  Monitor I/Os, BUN/ Cr  and electrolytes  Keep Balderrama for UO monitoring- likely d/c later today    ==================== GASTROINTESTINAL===================  PEG feeds on hold since midnight for possible bronchoscopy today as d/w dr Gallegos  yesterday  Continue GI prophylaxis with Pepcid   Zofran / Reglan for nausea - PRN	  NPO  ? R  Chylothorax   - On Vivonex  + MCT oil and   Octreotide drip    =======================    ENDOCRIN  =====================  Glycemic monitoring  F/S with coverage  ===================HEMATOLOGIC/ONCOLOGIC =============   No signs of active bleeding.   Follow CBC, coags  in AM  DVT prophylaxis with SCD and AC with Eliquis fro SVT/Afib     ========================INFECTIOUS DISEASE===============  No signs of new infection. Monitor for fever / leukocytosis.  All surgical incision / chest tube  sites look clean  Likely D/C Balderrama later today   Rt lower lobe Serratia pneumonia and oral candidasis : On Meropenem  / Diflucan,  to be completed on 09/12      Pertinent clinical, laboratory, radiographic, hemodynamic, echocardiographic, respiratory data, microbiologic data and chart were reviewed and analyzed frequently throughout the course of the day and night. GI and DVT prophylaxis, glycemic control, head of bed elevation and skin care issues were addressed.  Patient seen, examined and plan discussed with CT Surgery / CTICU team during rounds.  Pt remains critically ill in imminent risk of  deterioration and requires very careful cardio- pulmonary monitoring and support.    I have spent   35 minutes of critical care time with this pt between   12  am   and      8  am         minutes spent on total encounter; more than 50% of the visit was spent counseling and/or coordinating care by the attending physician.    Pt's status discussed with family at bedside, updated status   is aware of possible  rehab placement  this  week    ALVARO Snowden MD LIAM VELOZ          MRN-[id number]    HPI  83 y.o. female with hx of COPD, c/o SOB, dyspnea on exertion, fatigue, weight loss of 15 lbs in last 6 months, reports occasional nonproductive cough, s/p CXR, followed by CT scan, reports multiple right lung nodules noted admitted  for lung resection.    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  Yesterday OOB to chair, breathing comfortably with minimal pain. Ambulated several times on hallway with assistance. . Denies SOB, no palpitations, no nausea/ no vomiting, no dizziness.     PAST MEDICAL & SURGICAL HISTORY:  Lung nodules  COPD (Chronic Obstructive Pulmonary Disease): diagnosed in ~2013  Colon Polyps  History of Osteoporosis  History of Cataract Surgery: left eye  Hip Replacement: bilateral               Home Medications:  Advair Diskus 250 mcg-50 mcg inhalation powder: 1 puff(s) inhaled 2 times a day (27 Aug 2018 12:42)  Spiriva 18 mcg inhalation capsule: 1 cap(s) inhaled once a day (27 Aug 2018 12:42)    Allergies  No Known Allergies    ***VITAL SIGNS:  Vital Signs Last 24 Hrs  T(C): 36.6 (10 Sep 2018 04:00), Max: 36.9 (09 Sep 2018 12:00)  T(F): 97.8 (10 Sep 2018 04:00), Max: 98.4 (09 Sep 2018 12:00)  HR: 67 (10 Sep 2018 03:00) (64 - 78)  BP: 143/60 (10 Sep 2018 03:00) (114/555 - 168/57)  BP(mean): 80 (10 Sep 2018 03:00) (68 - 777)  RR: 22 (10 Sep 2018 03:00) (20 - 28)  SpO2: 96% (10 Sep 2018 03:00) (91% - 99%)    I/Os:  08 Sep 2018 07:01  -  09 Sep 2018 07:00  --------------------------------------------------------  IN:    Enteral Tube Flush: 240 mL    IV PiggyBack: 562.5 mL    octreotide  Infusion: 288 mL    Vivonex RTH: 1320 mL  Total IN: 2410.5 mL    OUT:    Indwelling Catheter - Urethral: 2030 mL  Total OUT: 2030 mL    Total NET: 380.5 mL      09 Sep 2018 07:01  -  10 Sep 2018 04:49  --------------------------------------------------------  IN:    Enteral Tube Flush: 150 mL    IV PiggyBack: 350 mL    octreotide  Infusion: 252 mL    Vivonex RTH: 880 mL  Total IN: 1632 mL    OUT:    Indwelling Catheter - Urethral: 2110 mL  Total OUT: 2110 mL    Total NET: -478 mL  =======================  MEDICATIONS  ===================  MEDICATIONS  (STANDING):  apixaban 2.5 milliGRAM(s) Oral every 12 hours  docusate sodium Liquid 100 milliGRAM(s) Oral three times a day  dornase selina Solution 2.5 milliGRAM(s) Inhalation daily  famotidine    Tablet 20 milliGRAM(s) Oral daily  fluconAZOLE IVPB      fluconAZOLE IVPB 200 milliGRAM(s) IV Intermittent every 24 hours  ipratropium    for Nebulization 500 MICROGram(s) Nebulizer every 6 hours  levalbuterol Inhalation 0.63 milliGRAM(s) Inhalation every 6 hours  medium chain triglycerides Oil 15 milliLiter(s) Oral three times a day  meropenem  IVPB 1000 milliGRAM(s) IV Intermittent every 8 hours  metoprolol tartrate 25 milliGRAM(s) Oral every 8 hours  octreotide  Infusion 60 MICROgram(s)/Hr (12 mL/Hr) IV Continuous <Continuous>  potassium phosphate / sodium phosphate powder 1 Packet(s) Oral four times a day  potassium phosphate / sodium phosphate powder 1 Packet(s) Oral every 6 hours    MEDICATIONS  (PRN):  melatonin 3 milliGRAM(s) Oral at bedtime PRN Sleep  oxyCODONE    Solution 5 milliGRAM(s) Oral every 4 hours PRN Moderate Pain (4 - 6)    =================== PATIENT CARE ACCESS DEVICES ==========  Peripheral IV(+)			  Urinary Catheter,(+)   ======================= PHYSICAL EXAM===================  General:            Appears deconditioned, Comfortable, Awake, alert, not in any distress  Neuro:              Moving all extremities to commands. No focal deficits	  HEENT:                           ELE  Respiratory:	Slightly decreased breath sounds on right lower lung field                            No rales, few  rhonchi, no wheezing. Effort even and unlabored.                           Right chest surgical site - clean  CV:		Regular rate and rhythm. Normal S1/S2.   Abdomen:         Soft,  nontender, not-distended. Bowel sounds present ; PEG site - clean  Skin:		No rash.  Extremities:	Warm, no cyanosis or edema.  Palpable pulses    ============================ LABS =======================                        11.7   9.55  )-----------( 476      ( 10 Sep 2018 03:00 )             33.8     09-10    139  |  99  |  18  ----------------------------<  101<H>  4.3   |  29  |  0.38<L>    Ca    8.8      10 Sep 2018 03:00  Phos  2.3     09-10  Mg     2.1     09-10    TPro  5.8<L>  /  Alb  2.5<L>  /  TBili  0.3  /  DBili  x   /  AST  25  /  ALT  19  /  AlkPhos  120  09-09        PT/INR - ( 10 Sep 2018 03:00 )   PT: 13.0 SEC;   INR: 1.17   )  PTT:23.8 SEC    BLOOD VENOUS  09-04-18 --  --  --  BRONCHIAL LAVAGE  08-31-18 --  --  Serratia marcescens  PLEURAL FLUID  08-31-18 --  --    NOS^No Organisms Seen  WBC^White Blood Cells  QNTY CELLS IN GRAM STAIN: FEW (2+)    BRONCHIAL LAVAGE  08-30-18 --  --  Serratia marcescens    ===================== IMAGING STUDIES ===================  Radiology personally reviewed.  < from: Xray Chest 1 View- PORTABLE-Routine (09.09.18 @ 07:56) >  ICLINICAL INDICATION: Pleural effusion / Aspiration pneumonia.  FINDINGS:   Cardiac silhouette not well evaluated. Small right pleural effusion with   adjacent opacity is unchanged. Lungs otherwise clear. No pneumothorax.  IMPRESSION:   No change from prior study.  < end of copied text >  ====================ASSESSMENT AND PLAN ================  83 yFemale s/p RVATS /   Right upper lobectomy   08/27/2018, experiencing  pain with deep breathing. 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)                   Bronchoscopy 8/30, 08/31, 09/01/18                    PEG 9/05/18  Issues:   Aspiration Pneumonia/+Serratia marcescens  Atelectasis/ small right pleural effusion  Shortness of breath / Bronchospasm / COPD exacerbation  postop SVT / a-FIB with pauses              Postop pain              HTN              Dysphagia              hypophosphatemia- supplemented    ====================== NEUROLOGY=======================  Pain control with Tylenol IV  Preferably no narcotics  ==================== RESPIRATORY========================  Pt is on   1-2  L nasal canula   Comfortable, no evidence of distress.  Using incentive spirometry   Continue bronchodilators, pulmonary toilet  dornase selina Solution 2.5 milliGRAM(s) Inhalation daily  ipratropium    for Nebulization 500 MICROGram(s) Nebulizer every 6 hours  levalbuterol Inhalation 0.63 milliGRAM(s) Inhalation every 6 hours  Head of bed elevation to 30-40 degrees  OOB, ambulation  Possible bronch today if CXR today not improving  ====================CARDIOVASCULAR=====================  Continue hemodynamic monitoring/ telemetry  Continue cardiovascular / antihypertensive medications  Postop SVT / A-fb: on  Lopressor 25mg q8hrs via PEG, increase as tolerated &  no more pauses.  Started Eliquis for anticoagulation 3 days ago as per EP recs ( on hold since yesterday for possible bronch today   HTN:  Increase lopressor as tolerated  ===================== RENAL ============================  Monitor I/Os, BUN/ Cr  and electrolytes  Keep Balderrama for UO monitoring- likely d/c later today  ==================== GASTROINTESTINAL===================  PEG feeds on hold since midnight for possible bronchoscopy today as d/w dr Gallegos  yesterday  Continue GI prophylaxis with Pepcid   Zofran / Reglan for nausea - PRN	  NPO  ? R  Chylothorax   - On Vivonex  + MCT oil and   Octreotide drip  =======================    ENDOCRIN  =====================  Glycemic monitoring  F/S with coverage  ===================HEMATOLOGIC/ONCOLOGIC =============   No signs of active bleeding.   Follow CBC, coags  in AM  DVT prophylaxis with SCD and AC with Eliquis fro SVT/Afib   ========================INFECTIOUS DISEASE===============  No signs of new infection. Monitor for fever / leukocytosis.  All surgical incision / chest tube  sites look clean  Likely D/C Balderrama later today   Rt lower lobe Serratia pneumonia and oral candidasis : On Meropenem  / Diflucan,  to be completed on 09/12    Pertinent clinical, laboratory, radiographic, hemodynamic, echocardiographic, respiratory data, microbiologic data and chart were reviewed and analyzed frequently throughout the course of the day and night. GI and DVT prophylaxis, glycemic control, head of bed elevation and skin care issues were addressed.  Patient seen, examined and plan discussed with CT Surgery / CTICU team during rounds.  Pt remains critically ill in imminent risk of  deterioration and requires very careful cardio- pulmonary monitoring and support.    I have spent   35 minutes of critical care time with this pt between   12  am   and      8  am         minutes spent on total encounter; more than 50% of the visit was spent counseling and/or coordinating care by the attending physician.    Pt's status discussed with family at bedside, updated status      ALVARO Snowden MD

## 2018-09-10 NOTE — PROGRESS NOTE ADULT - ASSESSMENT
83 y.o. female with hx of COPD, no significant cardiac history presenting with intraop- postop SVT s/p Right upper lobectomy.  Pt has been c/o fatigue, weight loss of 15 lbs in last 6 months, and reports occasional nonproductive cough.  CXR and subsequent CT scan, reports multiple right lung nodules.  Pt admitted  for lung resection.  Pt underwent lobectomy of right lung  with video-assisted thoracoscopic surgery  on 8/27/2018 and flexible bronchoscopy with bronchopulmonary lavage.  She has a right chest tube in place.      Post op course complicated by SVT and A.fib with pauses and pneumothorax.         Pt currently without fever.  WBC 14.1 on 8/28 --> 10.5.  Pt was given a dose of rocephin on 8/30 for possible pneumonia.  During bedside bronchoscopy pt noted to have copious murky secretions concerning for infection and oropharyngeal candidiases.  ID consult called for further antibiotic managment.     Problem/Plan - 1:    ·	Pna    - Pt s/p RUL lobectomy, s/p bronch/lavage, chest tube to suction.  Noted to have copious purulent secretions and oropharyngeal candidiasis.      - Complete 2 weeks of diflucan to cover for possible esophageal candidiasis (through 9/12)    - Respiratory cultures sent from 8/30 and 8/31 - thus far growing Serratia marcesans. Pt on meropenem, will cont for now, anticipate 2 week course.  Chest tube removed.      - blood cultures sent on 9/4 - ngtd    - Serial chest xrays.        Margareth Hodge  [phone]

## 2018-09-10 NOTE — CHART NOTE - NSCHARTNOTEFT_GEN_A_CORE
Ms. Hinton is a pleasant 82 yo F former smoker with COPD with recent REED and weight loss found to have RUL nodule concerning for malignancy now s/p RUL lobectomy, with subsequent development of recurrent SVT, found to be in afib/flutter post op.     Reviewed telemetry - Remains in Sinus rhythm 70's. No atrial arrhytmias detected.   No indication for device therapy at the present time.   Recommend Anticoagulation when cleared from surgical stand point given her history of PAF and CHADS VaSc score of 2 ( Age / gender).   ELiquis on HOlD for bronchoscopy and possible pig tail drain  Continue Metop 25 Q8H    Thank you.     Amber Lange,FNP-C  [id number].

## 2018-09-11 LAB
ALBUMIN SERPL ELPH-MCNC: 2.8 G/DL — LOW (ref 3.3–5)
ALP SERPL-CCNC: 129 U/L — HIGH (ref 40–120)
ALT FLD-CCNC: 26 U/L — SIGNIFICANT CHANGE UP (ref 4–33)
AST SERPL-CCNC: 25 U/L — SIGNIFICANT CHANGE UP (ref 4–32)
BILIRUB SERPL-MCNC: 0.5 MG/DL — SIGNIFICANT CHANGE UP (ref 0.2–1.2)
BUN SERPL-MCNC: 15 MG/DL — SIGNIFICANT CHANGE UP (ref 7–23)
BUN SERPL-MCNC: 15 MG/DL — SIGNIFICANT CHANGE UP (ref 7–23)
CALCIUM SERPL-MCNC: 9 MG/DL — SIGNIFICANT CHANGE UP (ref 8.4–10.5)
CALCIUM SERPL-MCNC: 9 MG/DL — SIGNIFICANT CHANGE UP (ref 8.4–10.5)
CHLORIDE SERPL-SCNC: 96 MMOL/L — LOW (ref 98–107)
CHLORIDE SERPL-SCNC: 97 MMOL/L — LOW (ref 98–107)
CO2 SERPL-SCNC: 32 MMOL/L — HIGH (ref 22–31)
CO2 SERPL-SCNC: 33 MMOL/L — HIGH (ref 22–31)
CREAT SERPL-MCNC: 0.38 MG/DL — LOW (ref 0.5–1.3)
CREAT SERPL-MCNC: 0.48 MG/DL — LOW (ref 0.5–1.3)
GLUCOSE SERPL-MCNC: 111 MG/DL — HIGH (ref 70–99)
GLUCOSE SERPL-MCNC: 121 MG/DL — HIGH (ref 70–99)
HCT VFR BLD CALC: 36.7 % — SIGNIFICANT CHANGE UP (ref 34.5–45)
HGB BLD-MCNC: 12.5 G/DL — SIGNIFICANT CHANGE UP (ref 11.5–15.5)
MAGNESIUM SERPL-MCNC: 2 MG/DL — SIGNIFICANT CHANGE UP (ref 1.6–2.6)
MAGNESIUM SERPL-MCNC: 2.1 MG/DL — SIGNIFICANT CHANGE UP (ref 1.6–2.6)
MCHC RBC-ENTMCNC: 31.2 PG — SIGNIFICANT CHANGE UP (ref 27–34)
MCHC RBC-ENTMCNC: 34.1 % — SIGNIFICANT CHANGE UP (ref 32–36)
MCV RBC AUTO: 91.5 FL — SIGNIFICANT CHANGE UP (ref 80–100)
NRBC # FLD: 0 — SIGNIFICANT CHANGE UP
PHOSPHATE SERPL-MCNC: 2.7 MG/DL — SIGNIFICANT CHANGE UP (ref 2.5–4.5)
PHOSPHATE SERPL-MCNC: 2.8 MG/DL — SIGNIFICANT CHANGE UP (ref 2.5–4.5)
PLATELET # BLD AUTO: 470 K/UL — HIGH (ref 150–400)
PMV BLD: 9.5 FL — SIGNIFICANT CHANGE UP (ref 7–13)
POTASSIUM SERPL-MCNC: 4.3 MMOL/L — SIGNIFICANT CHANGE UP (ref 3.5–5.3)
POTASSIUM SERPL-MCNC: 4.5 MMOL/L — SIGNIFICANT CHANGE UP (ref 3.5–5.3)
POTASSIUM SERPL-SCNC: 4.3 MMOL/L — SIGNIFICANT CHANGE UP (ref 3.5–5.3)
POTASSIUM SERPL-SCNC: 4.5 MMOL/L — SIGNIFICANT CHANGE UP (ref 3.5–5.3)
PROT SERPL-MCNC: 6 G/DL — SIGNIFICANT CHANGE UP (ref 6–8.3)
RBC # BLD: 4.01 M/UL — SIGNIFICANT CHANGE UP (ref 3.8–5.2)
RBC # FLD: 13.6 % — SIGNIFICANT CHANGE UP (ref 10.3–14.5)
SODIUM SERPL-SCNC: 138 MMOL/L — SIGNIFICANT CHANGE UP (ref 135–145)
SODIUM SERPL-SCNC: 141 MMOL/L — SIGNIFICANT CHANGE UP (ref 135–145)
WBC # BLD: 9.68 K/UL — SIGNIFICANT CHANGE UP (ref 3.8–10.5)
WBC # FLD AUTO: 9.68 K/UL — SIGNIFICANT CHANGE UP (ref 3.8–10.5)

## 2018-09-11 PROCEDURE — 32557 INSERT CATH PLEURA W/ IMAGE: CPT

## 2018-09-11 PROCEDURE — 31624 DX BRONCHOSCOPE/LAVAGE: CPT

## 2018-09-11 PROCEDURE — 99232 SBSQ HOSP IP/OBS MODERATE 35: CPT

## 2018-09-11 PROCEDURE — 99233 SBSQ HOSP IP/OBS HIGH 50: CPT

## 2018-09-11 PROCEDURE — 71045 X-RAY EXAM CHEST 1 VIEW: CPT | Mod: 26,76

## 2018-09-11 RX ORDER — ACETAZOLAMIDE 250 MG/1
125 TABLET ORAL ONCE
Qty: 0 | Refills: 0 | Status: COMPLETED | OUTPATIENT
Start: 2018-09-11 | End: 2018-09-11

## 2018-09-11 RX ADMIN — LEVALBUTEROL 0.63 MILLIGRAM(S): 1.25 SOLUTION, CONCENTRATE RESPIRATORY (INHALATION) at 09:37

## 2018-09-11 RX ADMIN — MEROPENEM 100 MILLIGRAM(S): 1 INJECTION INTRAVENOUS at 14:30

## 2018-09-11 RX ADMIN — Medication 15 MILLILITER(S): at 05:17

## 2018-09-11 RX ADMIN — LEVALBUTEROL 0.63 MILLIGRAM(S): 1.25 SOLUTION, CONCENTRATE RESPIRATORY (INHALATION) at 22:02

## 2018-09-11 RX ADMIN — Medication 25 MILLIGRAM(S): at 21:56

## 2018-09-11 RX ADMIN — SODIUM CHLORIDE 4 MILLILITER(S): 9 INJECTION INTRAMUSCULAR; INTRAVENOUS; SUBCUTANEOUS at 09:37

## 2018-09-11 RX ADMIN — OCTREOTIDE ACETATE 12 MICROGRAM(S)/HR: 200 INJECTION, SOLUTION INTRAVENOUS; SUBCUTANEOUS at 19:55

## 2018-09-11 RX ADMIN — DORNASE ALFA 2.5 MILLIGRAM(S): 1 SOLUTION RESPIRATORY (INHALATION) at 09:38

## 2018-09-11 RX ADMIN — Medication 100 MILLIGRAM(S): at 21:56

## 2018-09-11 RX ADMIN — MEROPENEM 100 MILLIGRAM(S): 1 INJECTION INTRAVENOUS at 21:56

## 2018-09-11 RX ADMIN — FLUCONAZOLE 100 MILLIGRAM(S): 150 TABLET ORAL at 12:36

## 2018-09-11 RX ADMIN — Medication 1 PACKET(S): at 18:44

## 2018-09-11 RX ADMIN — Medication 25 MILLIGRAM(S): at 05:18

## 2018-09-11 RX ADMIN — Medication 1 PACKET(S): at 05:18

## 2018-09-11 RX ADMIN — OXYCODONE HYDROCHLORIDE 5 MILLIGRAM(S): 5 TABLET ORAL at 12:30

## 2018-09-11 RX ADMIN — OXYCODONE HYDROCHLORIDE 5 MILLIGRAM(S): 5 TABLET ORAL at 20:19

## 2018-09-11 RX ADMIN — FAMOTIDINE 20 MILLIGRAM(S): 10 INJECTION INTRAVENOUS at 12:36

## 2018-09-11 RX ADMIN — Medication 500 MICROGRAM(S): at 16:00

## 2018-09-11 RX ADMIN — LEVALBUTEROL 0.63 MILLIGRAM(S): 1.25 SOLUTION, CONCENTRATE RESPIRATORY (INHALATION) at 04:15

## 2018-09-11 RX ADMIN — LEVALBUTEROL 0.63 MILLIGRAM(S): 1.25 SOLUTION, CONCENTRATE RESPIRATORY (INHALATION) at 16:00

## 2018-09-11 RX ADMIN — SODIUM CHLORIDE 4 MILLILITER(S): 9 INJECTION INTRAMUSCULAR; INTRAVENOUS; SUBCUTANEOUS at 22:15

## 2018-09-11 RX ADMIN — Medication 500 MICROGRAM(S): at 09:37

## 2018-09-11 RX ADMIN — Medication 500 MICROGRAM(S): at 04:15

## 2018-09-11 RX ADMIN — OXYCODONE HYDROCHLORIDE 5 MILLIGRAM(S): 5 TABLET ORAL at 21:00

## 2018-09-11 RX ADMIN — Medication 300 MILLIGRAM(S): at 10:15

## 2018-09-11 RX ADMIN — SODIUM CHLORIDE 4 MILLILITER(S): 9 INJECTION INTRAMUSCULAR; INTRAVENOUS; SUBCUTANEOUS at 16:00

## 2018-09-11 RX ADMIN — OXYCODONE HYDROCHLORIDE 5 MILLIGRAM(S): 5 TABLET ORAL at 12:45

## 2018-09-11 RX ADMIN — Medication 3 MILLIGRAM(S): at 22:50

## 2018-09-11 RX ADMIN — Medication 15 MILLILITER(S): at 22:52

## 2018-09-11 RX ADMIN — APIXABAN 2.5 MILLIGRAM(S): 2.5 TABLET, FILM COATED ORAL at 18:44

## 2018-09-11 RX ADMIN — Medication 750 MILLIGRAM(S): at 10:30

## 2018-09-11 RX ADMIN — Medication 1 PACKET(S): at 12:36

## 2018-09-11 RX ADMIN — Medication 25 MILLIGRAM(S): at 14:30

## 2018-09-11 RX ADMIN — MEROPENEM 100 MILLIGRAM(S): 1 INJECTION INTRAVENOUS at 05:18

## 2018-09-11 RX ADMIN — Medication 500 MICROGRAM(S): at 22:02

## 2018-09-11 RX ADMIN — Medication 15 MILLILITER(S): at 18:44

## 2018-09-11 RX ADMIN — ACETAZOLAMIDE 125 MILLIGRAM(S): 250 TABLET ORAL at 18:44

## 2018-09-11 NOTE — PROGRESS NOTE ADULT - SUBJECTIVE AND OBJECTIVE BOX
Infectious Diseases progress note:    Subjective: s/p rt pigtail catheter placement.  Found to have 400cc of pleural fluid on US, s/p right pigtail catheter with serous fluid , and bedside bronchoscopy.  Improved secretions noted.     ROS:  CONSTITUTIONAL:  No fever, chills, rigors  CARDIOVASCULAR:  No chest pain or palpitations  RESPIRATORY:   No SOB, cough, dyspnea on exertion.  No wheezing  GASTROINTESTINAL:  No abd pain, N/V, diarrhea/constipation  EXTREMITIES:  No swelling or joint pain  GENITOURINARY:  No burning on urination, increased frequency or urgency.  No flank pain  NEUROLOGIC:  No HA, visual disturbances  SKIN: No rashes    Allergies    No Known Allergies    Intolerances        ANTIBIOTICS/RELEVANT:  antimicrobials  fluconAZOLE IVPB      fluconAZOLE IVPB 200 milliGRAM(s) IV Intermittent every 24 hours  meropenem  IVPB 1000 milliGRAM(s) IV Intermittent every 8 hours    immunologic:    OTHER:  acetaminophen  IVPB .. 1000 milliGRAM(s) IV Intermittent once PRN  apixaban 2.5 milliGRAM(s) Oral every 12 hours  docusate sodium Liquid 100 milliGRAM(s) Oral three times a day  dornase selina Solution 2.5 milliGRAM(s) Inhalation daily  famotidine    Tablet 20 milliGRAM(s) Oral daily  ipratropium    for Nebulization 500 MICROGram(s) Nebulizer every 6 hours  levalbuterol Inhalation 0.63 milliGRAM(s) Inhalation every 6 hours  Lidocaine 4% Solution 4 milliLiter(s) 4 milliLiter(s) Inhalation once  medium chain triglycerides Oil 15 milliLiter(s) Oral three times a day  melatonin 3 milliGRAM(s) Oral at bedtime PRN  metoprolol tartrate 25 milliGRAM(s) Oral every 8 hours  octreotide  Infusion 60 MICROgram(s)/Hr IV Continuous <Continuous>  oxyCODONE    Solution 5 milliGRAM(s) Oral every 4 hours PRN  potassium phosphate / sodium phosphate powder 1 Packet(s) Oral four times a day  sodium chloride 3%  Inhalation 4 milliLiter(s) Inhalation every 8 hours      Objective:  Vital Signs Last 24 Hrs  T(C): 36.7 (11 Sep 2018 12:00), Max: 36.7 (10 Sep 2018 20:00)  T(F): 98.1 (11 Sep 2018 12:00), Max: 98.1 (10 Sep 2018 20:00)  HR: 79 (11 Sep 2018 16:01) (60 - 87)  BP: 139/57 (11 Sep 2018 12:00) (123/61 - 162/60)  BP(mean): 76 (11 Sep 2018 12:00) (71 - 92)  RR: 27 (11 Sep 2018 12:00) (16 - 29)  SpO2: 100% (11 Sep 2018 16:01) (90% - 100%)    PHYSICAL EXAM:  Constitutional:NAD  Eyes:ELE, EOMI  Ear/Nose/Throat: no thrush, mucositis.  Moist mucous membranes	  Neck:no JVD, no lymphadenopathy, supple  Respiratory: CTA lanette, rt chest tube/pigtail cath  Cardiovascular: S1S2 RRR, no murmurs  Gastrointestinal:soft, nontender,  nondistended (+) BS  Extremities:no e/e/c  Skin:  no rashes, open wounds or ulcerations        LABS:                        12.5   9.68  )-----------( 470      ( 11 Sep 2018 04:30 )             36.7     09-11    141  |  97<L>  |  15  ----------------------------<  111<H>  4.5   |  32<H>  |  0.48<L>    Ca    9.0      11 Sep 2018 14:07  Phos  2.8     09-11  Mg     2.0     09-11    TPro  6.0  /  Alb  2.8<L>  /  TBili  0.5  /  DBili  x   /  AST  25  /  ALT  26  /  AlkPhos  129<H>  09-11    PT/INR - ( 10 Sep 2018 03:00 )   PT: 13.0 SEC;   INR: 1.17          PTT - ( 10 Sep 2018 03:00 )  PTT:23.8 SEC                        MICROBIOLOGY:      Culture - Blood (09.04.18 @ 11:42)    Culture - Blood:   NO ORGANISMS ISOLATED    Specimen Source: BLOOD VENOUS    Culture - Respiratory with Gram Stain (08.31.18 @ 13:46)    -  Gentamicin: S <=1 JOANNE    -  Imipenem: S <=1 JOANNE    -  Levofloxacin: S <=1 JOANNE    Gram Stain Sputum:   GPR Gram Positive Rods  QUANTITY OF BACTERIA SEEN: RARE (1+)  WBC^White Blood Cells  QNTY CELLS IN GRAM STAIN: MANY (4+)    -  Amikacin: S <=8 JOANNE    -  Ampicillin: R >16 JOANNE    -  Ampicillin/Sulbactam: R >16/8 JOANNE    -  Trimethoprim/Sulfamethoxazole: S <=0.5/9.5 JOANNE    Culture - Respiratory:   Normal Respiratory Mallory Absent    -  Piperacillin/Tazobactam: S <=8 JOANNE    -  Tigecycline: S <=1 JOANNE    -  Meropenem: S <=1 JOANNE    -  Tobramycin: S <=2 JOANNE    -  Aztreonam: S <=4 JOANNE    -  Cefazolin: R >16 JOANNE    -  Cefepime: S <=2 JOANNE    -  Cefoxitin: R 16 JOANNE    -  Ceftazidime: S <=1 JOANNE    -  Ceftriaxone: S <=1 JOANNE    -  Ciprofloxacin: S <=0.5 JOANNE    -  Ertapenem: S <=0.5 JOANNE    Specimen Source: BRONCHIAL LAVAGE    Organism Identification: Serratia marcescens    Organism: Serratia marcescens  QUANTITY OF GROWTH: MODERATE    Method Type: NEGATIVE JOANNE 43        RADIOLOGY & ADDITIONAL STUDIES:    < from: Xray Chest 1 View- PORTABLE-Urgent (09.11.18 @ 10:44) >  INTERPRETATION:     Heart size and the mediastinum cannot be accurately evaluated on this   projection. The thoracic aorta is calcified.  A small right pleural effusion with likely associated passive atelectasis   appears larger. The left lung is clear. No left pleural effusion seen.  No pneumothorax.    AP portable chest x-ray from September 11, 2018 at 10:29 AM:    CLINICAL INFORMATION: Right pigtail.    The heart is not enlarged. A calcified tortuous thoracic aorta is again   seen.  There is a new right basilar pleural pigtail catheter. No pneumothorax is   seen.  The previous right pleural effusion is smaller. The leftlung remains   clear.              IMPRESSION:  New right basilar pleural pigtail catheter. Previous right   pleural effusion is smaller.    No pneumothorax.    < end of copied text >

## 2018-09-11 NOTE — PROCEDURE NOTE - GENERAL PROCEDURE DETAILS
4cc 4% nebulized lidocaine given.  Patient placed on monitors, nonrebreather mask, and given 1mg versed.  #6 Bronchoscope inserted through oropharynx, 2cc 2% lidocaine administered first to vocal cords and then to peter. Significant thick secretions seen.  Bronchoscope used to lavage and suction secretions out.  RUL stump appeared intact.  Bronchoscope removed. Patient tolerated procedure well.
4cc 4% nebulized lidocaine given.  Patient placed on monitors, 1mg versed given. Bronchoscope through mouth, cords visualized.  1% lidocaine sprayed onto cords, bronch inserted and lidocaine sprayed onto peter.  Significant thick secretions seen, lavaged, and suctioned out.  Airways noted to be somewhat edematous and friable.  Bronchoscope withdrawn.  Pt tolerated procedure well.
1mg versed administered, lidocaine nebulized given.  #6 Flexible bronchoscope inserted into oropharynx.  Evidence of significant aspiration.  Bronchoalveolar lavage performed, specimen collected for culture.  RUL bronchial stump seen and intact.
Consent obtained and verified.  4% 4cc nebulized lidocaine given.  Pt placed on monitors and nonrebreather mask.  0.5mg versed given.  6mm bronchoscope inserted through mouth and advanced to vocal cords.  4cc 2% lidocaine sprayed onto vocal cords.  Scope advanced down into trachea.  Small amount of secretions seen, significantly improved from prior.  Scope withdrawn.  Pt tolerated procedure well.

## 2018-09-11 NOTE — PROCEDURE NOTE - NSPROCDETAILS_GEN_ALL_CORE
secured in place/sterile dressing applied/percutaneous/thoracostomy tube placed percutaneously/supine position/dressing applied/Seldinger technique

## 2018-09-11 NOTE — PROCEDURE NOTE - PROCEDURE
<<-----Click on this checkbox to enter Procedure Flexible bronchoscopy by surgeon at bedside  09/11/2018    Active  [id number]

## 2018-09-11 NOTE — PROGRESS NOTE ADULT - SUBJECTIVE AND OBJECTIVE BOX
TELE NOTE:    Telemetry reviewed demonstrating predominately NSR in the 90s; brief and self limiting episodes of sinus bradycardia in the 50s during hour of sleep. No atrial tachyarrhythmias noted over the last 24 hrs.    - no indication for device therapy at this time  - PAF, CHA2 DS2 Vasc, 2: consider systemic AC when cleared from a surgical stand point  - continue rate control with metoprolol  - d/w EP atttending

## 2018-09-11 NOTE — PROGRESS NOTE ADULT - ASSESSMENT
83 y.o. female with hx of COPD, no significant cardiac history presenting with intraop- postop SVT s/p Right upper lobectomy.  Pt has been c/o fatigue, weight loss of 15 lbs in last 6 months, and reports occasional nonproductive cough.  CXR and subsequent CT scan, reports multiple right lung nodules.  Pt admitted  for lung resection.  Pt underwent lobectomy of right lung  with video-assisted thoracoscopic surgery  on 8/27/2018 and flexible bronchoscopy with bronchopulmonary lavage.  She has a right chest tube in place.      Post op course complicated by SVT and A.fib with pauses and pneumothorax.         Pt currently without fever.  WBC 14.1 on 8/28 --> 10.5.  Pt was given a dose of rocephin on 8/30 for possible pneumonia.  During bedside bronchoscopy pt noted to have copious murky secretions concerning for infection and oropharyngeal candidiases.  ID consult called for further antibiotic managment.     Problem/Plan - 1:    ·	Pna    - Pt s/p RUL lobectomy, s/p bronch/lavage, chest tube to suction.  Noted to have copious purulent secretions and oropharyngeal candidiasis.      - Complete 2 weeks of diflucan to cover for possible esophageal candidiasis (through 9/12)    - Respiratory cultures sent from 8/30 and 8/31 - thus far growing Serratia marcesans. Pt on meropenem, will cont for now, anticipate 2 week course.     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  Repeat bronchoscopy shown to have improved secretions.    No new ID recs at this time.        Margareth Hodge  [phone]

## 2018-09-11 NOTE — PROGRESS NOTE ADULT - SUBJECTIVE AND OBJECTIVE BOX
LIAM VELOZ            MRN-[id number]         No Known Allergies               83 y.o. female with hx of COPD, c/o SOB, dyspnea on exertion, fatigue, weight loss of 15 lbs in last 6 months, reports occasional nonproductive cough, s/p CXR, followed by CT scan, reports multiple right lung nodules noted admitted  for lung resection.      Procedure:  RVATS /   Right upper lobectomy   08/27/2018 08/30, 08/31, 09/01 Bronch       Issues:   Aspiration Pneumonia  Atelectasis  Shortness of breath  / COPD   SVT / PAF              Postop pain  HTN                 Home Medications:  Advair Diskus 250 mcg-50 mcg inhalation powder: 1 puff(s) inhaled 2 times a day (27 Aug 2018 12:42)  Spiriva 18 mcg inhalation capsule: 1 cap(s) inhaled once a day (27 Aug 2018 12:42)      PAST MEDICAL & SURGICAL HISTORY:  Lung nodules  COPD (Chronic Obstructive Pulmonary Disease): diagnose in ~2013  Colon Polyps  History of Osteoporosis  History of Cataract Surgery: left eye  Hip Replacement: bilateral        ICU Vital Signs Last 24 Hrs  T(C): 36.7 (11 Sep 2018 04:00), Max: 36.9 (10 Sep 2018 12:00)  T(F): 98.1 (11 Sep 2018 04:00), Max: 98.4 (10 Sep 2018 12:00)  HR: 76 (11 Sep 2018 04:15) (60 - 85)  BP: 152/56 (11 Sep 2018 04:00) (123/61 - 167/60)  BP(mean): 82 (11 Sep 2018 04:00) (71 - 92)  ABP: --  ABP(mean): --  RR: 22 (11 Sep 2018 04:00) (16 - 29)  SpO2: 97% (11 Sep 2018 04:15) (90% - 100%)    I&O's Detail    09 Sep 2018 07:01  -  10 Sep 2018 07:00  --------------------------------------------------------  IN:    Enteral Tube Flush: 150 mL    IV PiggyBack: 400 mL    octreotide  Infusion: 288 mL    Vivonex RTH: 880 mL  Total IN: 1718 mL    OUT:    Indwelling Catheter - Urethral: 2530 mL  Total OUT: 2530 mL    Total NET: -812 mL      10 Sep 2018 07:01  -  11 Sep 2018 05:30  --------------------------------------------------------  IN:    Enteral Tube Flush: 270 mL    IV PiggyBack: 225 mL    octreotide  Infusion: 264 mL    Vivonex RTH: 825 mL  Total IN: 1584 mL    OUT:    Indwelling Catheter - Urethral: 1275 mL  Total OUT: 1275 mL    Total NET: 309 mL        CAPILLARY BLOOD GLUCOSE          Home Medications:  Advair Diskus 250 mcg-50 mcg inhalation powder: 1 puff(s) inhaled 2 times a day (27 Aug 2018 12:42)  Spiriva 18 mcg inhalation capsule: 1 cap(s) inhaled once a day (27 Aug 2018 12:42)      MEDICATIONS  (STANDING):  apixaban 2.5 milliGRAM(s) Oral every 12 hours  docusate sodium Liquid 100 milliGRAM(s) Oral three times a day  dornase selina Solution 2.5 milliGRAM(s) Inhalation daily  famotidine    Tablet 20 milliGRAM(s) Oral daily  fluconAZOLE IVPB      fluconAZOLE IVPB 200 milliGRAM(s) IV Intermittent every 24 hours  ipratropium    for Nebulization 500 MICROGram(s) Nebulizer every 6 hours  levalbuterol Inhalation 0.63 milliGRAM(s) Inhalation every 6 hours  medium chain triglycerides Oil 15 milliLiter(s) Oral three times a day  meropenem  IVPB 1000 milliGRAM(s) IV Intermittent every 8 hours  metoprolol tartrate 25 milliGRAM(s) Oral every 8 hours  octreotide  Infusion 60 MICROgram(s)/Hr (12 mL/Hr) IV Continuous <Continuous>  potassium phosphate / sodium phosphate powder 1 Packet(s) Oral four times a day  sodium chloride 3%  Inhalation 4 milliLiter(s) Inhalation every 8 hours    MEDICATIONS  (PRN):  acetaminophen  IVPB .. 750 milliGRAM(s) IV Intermittent once PRN Temp greater or equal to 38C (100.4F), Moderate Pain (4 - 6)  acetaminophen  IVPB .. 1000 milliGRAM(s) IV Intermittent once PRN Temp greater or equal to 38C (100.4F), Moderate Pain (4 - 6)  melatonin 3 milliGRAM(s) Oral at bedtime PRN Sleep  oxyCODONE    Solution 5 milliGRAM(s) Oral every 4 hours PRN Moderate Pain (4 - 6)          Physical exam:     General:               Pt is awake, appears to be fairly comfortable and  not in any distress                                                  Neuro:                  Nonfocal                             Cardiovascular:     S1 & S2, regular / irregular                          Respiratory:         Air entry is slightly decreased at right base, has bilateral rhonchi /  conducted sounds                           GI:                         Soft, nondistended and nontender, Bowel sounds active                            Ext:                        No cyanosis or edema                           Labs:                                                                           12.5   9.68  )-----------( 470      ( 11 Sep 2018 04:30 )             36.7             09-11    138  |  96<L>  |  15  ----------------------------<  121<H>  4.3   |  33<H>  |  0.38<L>    Ca    9.0      11 Sep 2018 04:30  Phos  2.7     09-11  Mg     2.1     09-11    TPro  5.8<L>  /  Alb  2.5<L>  /  TBili  0.3  /  DBili  x   /  AST  25  /  ALT  19  /  AlkPhos  120  09-09                  PT/INR - ( 10 Sep 2018 03:00 )   PT: 13.0 SEC;   INR: 1.17          PTT - ( 10 Sep 2018 03:00 )  PTT:23.8 SEC  LIVER FUNCTIONS - ( 09 Sep 2018 13:13 )  Alb: 2.5 g/dL / Pro: 5.8 g/dL / ALK PHOS: 120 u/L / ALT: 19 u/L / AST: 25 u/L / GGT: x               CXR:    < from: Xray Chest 1 View- PORTABLE-Routine (09.10.18 @ 06:47) >  Lines/Tubes: None    Heart and mediastinum:  Unchanged.    Lungs, pleura, and airways: Unchanged right pleural effusion with right   basilar atelectasis    Bones and soft tissues: The bones and soft tissues are unchanged.          Plan:    General: 83yFemale s/p RVATS /   Right upper lobectomy   08/27/2018 experiencing  pain with deep breathing. Postop period is significant for SVT / A-Fib, atelectasis of right lung, pneumonia requiring bronchoscopy multiple times.                             Neuro:                                         Pain control with  Tylenol only, avoid narcotics                            Cardiovascular:                                          Continue hemodynamic monitoring.    SVT / A-fb: Continue Lopressor 25mg q8hrs, increase as tolerated &  no more pauses. Restart Eliquis for anticoagulation if there is no plan to do thoracentesis    HTN:  Increase lopressor as tolerated                            Respiratory:                                              Pt is on 1-2L  nasal canula, wean off as tolerated       Rt lower lobe Serratia pneumonia: On Meropenem  / Diflucan,  to be completed on 09/12     Atelectasis - Aggressive pulmonary toilet + Pulmozyme + Saline inhalations                                         mild SOB, not in any distress.                                         Using incentive spirometry  / 500-800cc                                          Has about 400cc in right pleural space on ultrasound                                                        COPD: Continue  Xopenex / Atrovent nebulizer Rx.                             GI                                         Chylothorax ?  - On Vivenex + MCT oil. Currently on Octreotide drip     NPO  with tube feeds                                         Continue GI prophylaxis with  Protonix                                         Continue Zofran / Reglan for nausea - PRN	                                                                 Renal:                                         Gentle diuresis                                         Monitor I/Os and electrolytes                                                                                        Hem/ Onc:                                                                                  Monitor for signs of bleeding.                                          Follow CBC in AM                           Infectious disease:      Rt lower pneumonia: On Meropenem  / Diflucan. Afebrile and has no leukocytosis. Complete 2 week course of antibiotics       Monitor for fever / leukocytosis.                                         All surgical incision / chest tube  sites look clean                            Endocrine                                             Continue Accu-Checks with coverage    Pt is on full anticoagulation with Eliquis and Venodyne boots for DVT prophylaxis.     Pertinent clinical, laboratory, radiographic, hemodynamic, echocardiographic, respiratory data, microbiologic data and chart were reviewed and analyzed frequently throughout the course of the day and night  Patient seen, examined and plan discussed with CT Surgeon  / CTICU team during rounds.    Pt's status discussed with family at bedside, updated status              Michael Moya MD

## 2018-09-11 NOTE — PROCEDURE NOTE - NSINFORMCONSENT_GEN_A_CORE
Benefits, risks, and possible complications of procedure explained to patient/caregiver who verbalized understanding and gave written consent.

## 2018-09-12 LAB
BLD GP AB SCN SERPL QL: NEGATIVE — SIGNIFICANT CHANGE UP
BUN SERPL-MCNC: 17 MG/DL — SIGNIFICANT CHANGE UP (ref 7–23)
C DIFF TOX GENS STL QL NAA+PROBE: SIGNIFICANT CHANGE UP
CALCIUM SERPL-MCNC: 8.7 MG/DL — SIGNIFICANT CHANGE UP (ref 8.4–10.5)
CHLORIDE SERPL-SCNC: 98 MMOL/L — SIGNIFICANT CHANGE UP (ref 98–107)
CO2 SERPL-SCNC: 29 MMOL/L — SIGNIFICANT CHANGE UP (ref 22–31)
CREAT SERPL-MCNC: 0.41 MG/DL — LOW (ref 0.5–1.3)
GLUCOSE SERPL-MCNC: 143 MG/DL — HIGH (ref 70–99)
HCT VFR BLD CALC: 35.8 % — SIGNIFICANT CHANGE UP (ref 34.5–45)
HGB BLD-MCNC: 11.8 G/DL — SIGNIFICANT CHANGE UP (ref 11.5–15.5)
MAGNESIUM SERPL-MCNC: 2 MG/DL — SIGNIFICANT CHANGE UP (ref 1.6–2.6)
MCHC RBC-ENTMCNC: 30.5 PG — SIGNIFICANT CHANGE UP (ref 27–34)
MCHC RBC-ENTMCNC: 33 % — SIGNIFICANT CHANGE UP (ref 32–36)
MCV RBC AUTO: 92.5 FL — SIGNIFICANT CHANGE UP (ref 80–100)
NRBC # FLD: 0 — SIGNIFICANT CHANGE UP
PHOSPHATE SERPL-MCNC: 3.4 MG/DL — SIGNIFICANT CHANGE UP (ref 2.5–4.5)
PLATELET # BLD AUTO: 464 K/UL — HIGH (ref 150–400)
PMV BLD: 9.4 FL — SIGNIFICANT CHANGE UP (ref 7–13)
POTASSIUM SERPL-MCNC: 3.9 MMOL/L — SIGNIFICANT CHANGE UP (ref 3.5–5.3)
POTASSIUM SERPL-SCNC: 3.9 MMOL/L — SIGNIFICANT CHANGE UP (ref 3.5–5.3)
PREALB SERPL-MCNC: 17 MG/DL — LOW (ref 20–40)
RBC # BLD: 3.87 M/UL — SIGNIFICANT CHANGE UP (ref 3.8–5.2)
RBC # FLD: 13.7 % — SIGNIFICANT CHANGE UP (ref 10.3–14.5)
RH IG SCN BLD-IMP: POSITIVE — SIGNIFICANT CHANGE UP
SODIUM SERPL-SCNC: 139 MMOL/L — SIGNIFICANT CHANGE UP (ref 135–145)
WBC # BLD: 10.17 K/UL — SIGNIFICANT CHANGE UP (ref 3.8–10.5)
WBC # FLD AUTO: 10.17 K/UL — SIGNIFICANT CHANGE UP (ref 3.8–10.5)

## 2018-09-12 PROCEDURE — 99233 SBSQ HOSP IP/OBS HIGH 50: CPT

## 2018-09-12 PROCEDURE — 71045 X-RAY EXAM CHEST 1 VIEW: CPT | Mod: 26

## 2018-09-12 RX ORDER — SODIUM CHLORIDE 9 MG/ML
1000 INJECTION, SOLUTION INTRAVENOUS
Qty: 0 | Refills: 0 | Status: DISCONTINUED | OUTPATIENT
Start: 2018-09-12 | End: 2018-09-17

## 2018-09-12 RX ADMIN — LEVALBUTEROL 0.63 MILLIGRAM(S): 1.25 SOLUTION, CONCENTRATE RESPIRATORY (INHALATION) at 21:20

## 2018-09-12 RX ADMIN — SODIUM CHLORIDE 4 MILLILITER(S): 9 INJECTION INTRAMUSCULAR; INTRAVENOUS; SUBCUTANEOUS at 21:37

## 2018-09-12 RX ADMIN — SODIUM CHLORIDE 30 MILLILITER(S): 9 INJECTION, SOLUTION INTRAVENOUS at 22:21

## 2018-09-12 RX ADMIN — Medication 1 PACKET(S): at 00:29

## 2018-09-12 RX ADMIN — Medication 500 MICROGRAM(S): at 04:10

## 2018-09-12 RX ADMIN — SODIUM CHLORIDE 4 MILLILITER(S): 9 INJECTION INTRAMUSCULAR; INTRAVENOUS; SUBCUTANEOUS at 15:36

## 2018-09-12 RX ADMIN — APIXABAN 2.5 MILLIGRAM(S): 2.5 TABLET, FILM COATED ORAL at 05:30

## 2018-09-12 RX ADMIN — Medication 25 MILLIGRAM(S): at 05:30

## 2018-09-12 RX ADMIN — MEROPENEM 100 MILLIGRAM(S): 1 INJECTION INTRAVENOUS at 22:21

## 2018-09-12 RX ADMIN — Medication 500 MICROGRAM(S): at 10:16

## 2018-09-12 RX ADMIN — APIXABAN 2.5 MILLIGRAM(S): 2.5 TABLET, FILM COATED ORAL at 17:49

## 2018-09-12 RX ADMIN — FAMOTIDINE 20 MILLIGRAM(S): 10 INJECTION INTRAVENOUS at 12:58

## 2018-09-12 RX ADMIN — Medication 500 MICROGRAM(S): at 15:36

## 2018-09-12 RX ADMIN — MEROPENEM 100 MILLIGRAM(S): 1 INJECTION INTRAVENOUS at 06:04

## 2018-09-12 RX ADMIN — SODIUM CHLORIDE 30 MILLILITER(S): 9 INJECTION, SOLUTION INTRAVENOUS at 10:00

## 2018-09-12 RX ADMIN — LEVALBUTEROL 0.63 MILLIGRAM(S): 1.25 SOLUTION, CONCENTRATE RESPIRATORY (INHALATION) at 04:10

## 2018-09-12 RX ADMIN — Medication 25 MILLIGRAM(S): at 13:03

## 2018-09-12 RX ADMIN — SODIUM CHLORIDE 4 MILLILITER(S): 9 INJECTION INTRAMUSCULAR; INTRAVENOUS; SUBCUTANEOUS at 10:17

## 2018-09-12 RX ADMIN — Medication 3 MILLIGRAM(S): at 22:21

## 2018-09-12 RX ADMIN — DORNASE ALFA 2.5 MILLIGRAM(S): 1 SOLUTION RESPIRATORY (INHALATION) at 10:18

## 2018-09-12 RX ADMIN — MEROPENEM 100 MILLIGRAM(S): 1 INJECTION INTRAVENOUS at 14:39

## 2018-09-12 RX ADMIN — Medication 25 MILLIGRAM(S): at 22:21

## 2018-09-12 RX ADMIN — FLUCONAZOLE 100 MILLIGRAM(S): 150 TABLET ORAL at 12:57

## 2018-09-12 RX ADMIN — Medication 500 MICROGRAM(S): at 21:20

## 2018-09-12 RX ADMIN — LEVALBUTEROL 0.63 MILLIGRAM(S): 1.25 SOLUTION, CONCENTRATE RESPIRATORY (INHALATION) at 15:36

## 2018-09-12 RX ADMIN — LEVALBUTEROL 0.63 MILLIGRAM(S): 1.25 SOLUTION, CONCENTRATE RESPIRATORY (INHALATION) at 10:16

## 2018-09-12 RX ADMIN — OCTREOTIDE ACETATE 12 MICROGRAM(S)/HR: 200 INJECTION, SOLUTION INTRAVENOUS; SUBCUTANEOUS at 07:00

## 2018-09-12 NOTE — PROGRESS NOTE ADULT - SUBJECTIVE AND OBJECTIVE BOX
LIAM VELOZ          [id number]    HPI:      Procedure:  POD # :     Issues:        Interval/Overnight Events/ ROS  Pt remained hemodynamically stable overnight, not on any pressors or inotropes. OOB to chair, breathing comfortably with minimal pain. Ambulated several times . Denies pain, no SOB, no palpitations, no nausea/ no vomiting, no dizziness  A-line and soriano d/trina         PAST MEDICAL & SURGICAL HISTORY:  Lung nodules  COPD (Chronic Obstructive Pulmonary Disease): diagnose in ~2013  Colon Polyps  History of Osteoporosis  History of Cataract Surgery: left eye  Hip Replacement: bilateral    Allergies    No Known Allergies    Intolerances            ***VITAL SIGNS:  Vital Signs Last 24 Hrs  T(C): 36.7 (12 Sep 2018 00:00), Max: 36.8 (11 Sep 2018 16:00)  T(F): 98 (12 Sep 2018 00:00), Max: 98.3 (11 Sep 2018 16:00)  HR: 69 (12 Sep 2018 03:00) (63 - 93)  BP: 145/57 (12 Sep 2018 03:00) (121/80 - 162/60)  BP(mean): 78 (12 Sep 2018 03:00) (71 - 111)  RR: 20 (12 Sep 2018 03:00) (18 - 28)  SpO2: 97% (12 Sep 2018 03:00) (91% - 100%)    I/Os:   I&O's Detail    10 Sep 2018 07:01  -  11 Sep 2018 07:00  --------------------------------------------------------  IN:    Enteral Tube Flush: 290 mL    IV PiggyBack: 225 mL    octreotide  Infusion: 288 mL    Vivonex RTH: 825 mL  Total IN: 1628 mL    OUT:    Indwelling Catheter - Urethral: 1575 mL  Total OUT: 1575 mL    Total NET: 53 mL      11 Sep 2018 07:01  -  12 Sep 2018 03:52  --------------------------------------------------------  IN:    Enteral Tube Flush: 60 mL    IV PiggyBack: 150 mL    octreotide  Infusion: 240 mL    Vivonex RTH: 675 mL  Total IN: 1125 mL    OUT:    Chest Tube: 570 mL    Indwelling Catheter - Urethral: 1580 mL  Total OUT: 2150 mL    Total NET: -1025 mL          CAPILLARY BLOOD GLUCOSE          =======================  MEDICATIONS  ===================  MEDICATIONS  (STANDING):  apixaban 2.5 milliGRAM(s) Oral every 12 hours  docusate sodium Liquid 100 milliGRAM(s) Oral three times a day  dornase selina Solution 2.5 milliGRAM(s) Inhalation daily  famotidine    Tablet 20 milliGRAM(s) Oral daily  fluconAZOLE IVPB      fluconAZOLE IVPB 200 milliGRAM(s) IV Intermittent every 24 hours  ipratropium    for Nebulization 500 MICROGram(s) Nebulizer every 6 hours  levalbuterol Inhalation 0.63 milliGRAM(s) Inhalation every 6 hours  medium chain triglycerides Oil 15 milliLiter(s) Oral three times a day  meropenem  IVPB 1000 milliGRAM(s) IV Intermittent every 8 hours  metoprolol tartrate 25 milliGRAM(s) Oral every 8 hours  octreotide  Infusion 60 MICROgram(s)/Hr (12 mL/Hr) IV Continuous <Continuous>  sodium chloride 3%  Inhalation 4 milliLiter(s) Inhalation every 8 hours    MEDICATIONS  (PRN):  acetaminophen  IVPB .. 1000 milliGRAM(s) IV Intermittent once PRN Temp greater or equal to 38C (100.4F), Moderate Pain (4 - 6)  melatonin 3 milliGRAM(s) Oral at bedtime PRN Sleep  oxyCODONE    Solution 5 milliGRAM(s) Oral every 4 hours PRN Moderate Pain (4 - 6)      ======================VENTILATOR SETTINGS  ==============      =================== PATIENT CARE ACCESS DEVICES ==========  Peripheral IV  Central Venous Line	R	L	IJ	Fem	SC			Placed:   Arterial Line	R	L	PT	DP	Fem	Rad	Ax	Placed:   Midline:				  Urinary Catheter, Date Placed:   Necessity of urinary, arterial, and venous catheters discussed    ======================= PHYSICAL EXAM===================  General:                         Comfortable, Awake, alert, not in any distress  Neuro:                            Moving all extremities to commands. No focal deficits	  HEENT:                           ELE/ ETT/ NGT/ trach  Respiratory:	Lungs clear on auscultation bilaterally with good aeration.                                           No rales, rhonchi, no wheezing. Effort even and unlabored.  CV:		Regular rate and rhythm. Normal S1/S2. No murmurs  Abdomen:	                     Soft,  nontender, not-distended. Bowel sounds present / absent.   Skin:		No rash.  Extremities:	Warm, no cyanosis or edema.  Palpable pulses    ============================ LABS =======================                        12.5   9.68  )-----------( 470      ( 11 Sep 2018 04:30 )             36.7     09-11    141  |  97<L>  |  15  ----------------------------<  111<H>  4.5   |  32<H>  |  0.48<L>    Ca    9.0      11 Sep 2018 14:07  Phos  2.8     09-11  Mg     2.0     09-11    TPro  6.0  /  Alb  2.8<L>  /  TBili  0.5  /  DBili  x   /  AST  25  /  ALT  26  /  AlkPhos  129<H>  09-11    LIVER FUNCTIONS - ( 11 Sep 2018 14:07 )  Alb: 2.8 g/dL / Pro: 6.0 g/dL / ALK PHOS: 129 u/L / ALT: 26 u/L / AST: 25 u/L / GGT: x                 BLOOD VENOUS  09-04-18 --  --  --      BRONCHIAL LAVAGE  08-31-18 --  --  Serratia marcescens      PLEURAL FLUID  08-31-18 --  --    NOS^No Organisms Seen  WBC^White Blood Cells  QNTY CELLS IN GRAM STAIN: FEW (2+)      BRONCHIAL LAVAGE  08-30-18 --  --  Serratia marcescens          ===================== IMAGING STUDIES ===================  Radiology personally reviewed.    ====================ASSESSMENT AND PLAN ================      ====================== NEUROLOGY=======================  Pain control with PCA / PCEA / Tylenol IV / Toradol / Percocet  Pt is on Precedex for agitation  Pt is sedated with Propofol / Fentanyl    ==================== RESPIRATORY========================  Pt is on            L nasal canula / Face tent____% FiO2  Comfortable, no evidence of distress.  Using incentive spirometry & doing                ml  Monitor chest tube output  Chest tube to suction / water seal	    Mechanical Ventilation:    Mechanical ventilator status assessed & settings reviewed  Continue bronchodilators, pulmonary toilet  Head of bed elevation to 30-40 degrees    ====================CARDIOVASCULAR=====================  Continue hemodynamic monitoring/ telemetry  Not on any pressors  Continue cardiovascular / antihypertensive medications    ===================== RENAL ============================  Continue LR 30CC/hr      D/C IVF  Monitor I/Os, BUN/ Cr  and electrolytes  D/C Soriano      Keep Soriano for UO monitoring  BPH: Continue Flomax/ Finasteride      ==================== GASTROINTESTINAL===================  On regular diet, tolerating well  Continue GI prophylaxis with Pepcid / Protonix  Continue Zofran / Reglan for nausea - PRN	  NPO    =======================    ENDOCRIN  =====================  Glycemic monitoring  F/S with coverage  ===================HEMATOLOGIC/ONCOLOGIC =============  Monitor chest tube output. No signs of active bleeding.   Follow CBC, coags  in AM  DVT prophylaxis with SCD, sc Heparin    ========================INFECTIOUS DISEASE===============  No signs of infection. Monitor for fever / leukocytosis.  All surgical incision / chest tube  sites look clean  D/C Soriano      Pertinent clinical, laboratory, radiographic, hemodynamic, echocardiographic, respiratory data, microbiologic data and chart were reviewed and analyzed frequently throughout the course of the day and night. GI and DVT prophylaxis, glycemic control, head of bed elevation and skin care issues were addressed.  Patient seen, examined and plan discussed with CT Surgery / CTICU team during rounds.  Pt remains critically ill in imminent risk of  deterioration and requires very careful cardio- pulmonary monitoring and support.    I have spent               minutes of critical care time with this pt between            am/pm    and               am/ pm         minutes spent on total encounter; more than 50% of the visit was spent counseling and/or coordinating care by the attending physician.        ALVARO Snowden MD LIAM VELOZ          [id number]    HPI:      Procedure:  POD # :     Issues:        Interval/Overnight Events/ ROS  Pt remained hemodynamically stable overnight, not on any pressors or inotropes. OOB to chair, breathing comfortably with minimal pain. Ambulated several times . Denies pain, no SOB, no palpitations, no nausea/ no vomiting, no dizziness  A-line and soriano d/trina         PAST MEDICAL & SURGICAL HISTORY:  Lung nodules  COPD (Chronic Obstructive Pulmonary Disease): diagnose in ~2013  Colon Polyps  History of Osteoporosis  History of Cataract Surgery: left eye  Hip Replacement: bilateral    Allergies    No Known Allergies    Intolerances            ***VITAL SIGNS:  Vital Signs Last 24 Hrs  T(C): 36.7 (12 Sep 2018 00:00), Max: 36.8 (11 Sep 2018 16:00)  T(F): 98 (12 Sep 2018 00:00), Max: 98.3 (11 Sep 2018 16:00)  HR: 69 (12 Sep 2018 03:00) (63 - 93)  BP: 145/57 (12 Sep 2018 03:00) (121/80 - 162/60)  BP(mean): 78 (12 Sep 2018 03:00) (71 - 111)  RR: 20 (12 Sep 2018 03:00) (18 - 28)  SpO2: 97% (12 Sep 2018 03:00) (91% - 100%)    I/Os:   I&O's Detail    10 Sep 2018 07:01  -  11 Sep 2018 07:00  --------------------------------------------------------  IN:    Enteral Tube Flush: 290 mL    IV PiggyBack: 225 mL    octreotide  Infusion: 288 mL    Vivonex RTH: 825 mL  Total IN: 1628 mL    OUT:    Indwelling Catheter - Urethral: 1575 mL  Total OUT: 1575 mL    Total NET: 53 mL      11 Sep 2018 07:01  -  12 Sep 2018 03:52  --------------------------------------------------------  IN:    Enteral Tube Flush: 60 mL    IV PiggyBack: 150 mL    octreotide  Infusion: 240 mL    Vivonex RTH: 675 mL  Total IN: 1125 mL    OUT:    Chest Tube: 570 mL    Indwelling Catheter - Urethral: 1580 mL  Total OUT: 2150 mL    Total NET: -1025 mL          CAPILLARY BLOOD GLUCOSE          =======================  MEDICATIONS  ===================  MEDICATIONS  (STANDING):  apixaban 2.5 milliGRAM(s) Oral every 12 hours  docusate sodium Liquid 100 milliGRAM(s) Oral three times a day  dornase selina Solution 2.5 milliGRAM(s) Inhalation daily  famotidine    Tablet 20 milliGRAM(s) Oral daily  fluconAZOLE IVPB      fluconAZOLE IVPB 200 milliGRAM(s) IV Intermittent every 24 hours  ipratropium    for Nebulization 500 MICROGram(s) Nebulizer every 6 hours  levalbuterol Inhalation 0.63 milliGRAM(s) Inhalation every 6 hours  medium chain triglycerides Oil 15 milliLiter(s) Oral three times a day  meropenem  IVPB 1000 milliGRAM(s) IV Intermittent every 8 hours  metoprolol tartrate 25 milliGRAM(s) Oral every 8 hours  octreotide  Infusion 60 MICROgram(s)/Hr (12 mL/Hr) IV Continuous <Continuous>  sodium chloride 3%  Inhalation 4 milliLiter(s) Inhalation every 8 hours    MEDICATIONS  (PRN):  acetaminophen  IVPB .. 1000 milliGRAM(s) IV Intermittent once PRN Temp greater or equal to 38C (100.4F), Moderate Pain (4 - 6)  melatonin 3 milliGRAM(s) Oral at bedtime PRN Sleep  oxyCODONE    Solution 5 milliGRAM(s) Oral every 4 hours PRN Moderate Pain (4 - 6)      ======================VENTILATOR SETTINGS  ==============      =================== PATIENT CARE ACCESS DEVICES ==========  Peripheral IV  Central Venous Line	R	L	IJ	Fem	SC			Placed:   Arterial Line	R	L	PT	DP	Fem	Rad	Ax	Placed:   Midline:				  Urinary Catheter, Date Placed:   Necessity of urinary, arterial, and venous catheters discussed    ======================= PHYSICAL EXAM===================  General:                         Comfortable, Awake, alert, not in any distress  Neuro:                            Moving all extremities to commands. No focal deficits	  HEENT:                           ELE/ ETT/ NGT/ trach  Respiratory:	Lungs clear on auscultation bilaterally with good aeration.                                           No rales, rhonchi, no wheezing. Effort even and unlabored.  CV:		Regular rate and rhythm. Normal S1/S2. No murmurs  Abdomen:	                     Soft,  nontender, not-distended. Bowel sounds present / absent.   Skin:		No rash.  Extremities:	Warm, no cyanosis or edema.  Palpable pulses    ============================ LABS =======================                        12.5   9.68  )-----------( 470      ( 11 Sep 2018 04:30 )             36.7     09-11    141  |  97<L>  |  15  ----------------------------<  111<H>  4.5   |  32<H>  |  0.48<L>    Ca    9.0      11 Sep 2018 14:07  Phos  2.8     09-11  Mg     2.0     09-11    TPro  6.0  /  Alb  2.8<L>  /  TBili  0.5  /  DBili  x   /  AST  25  /  ALT  26  /  AlkPhos  129<H>  09-11    LIVER FUNCTIONS - ( 11 Sep 2018 14:07 )  Alb: 2.8 g/dL / Pro: 6.0 g/dL / ALK PHOS: 129 u/L / ALT: 26 u/L / AST: 25 u/L / GGT: x                 BLOOD VENOUS  09-04-18 --  --  --      BRONCHIAL LAVAGE  08-31-18 --  --  Serratia marcescens      PLEURAL FLUID  08-31-18 --  --    NOS^No Organisms Seen  WBC^White Blood Cells  QNTY CELLS IN GRAM STAIN: FEW (2+)      BRONCHIAL LAVAGE  08-30-18 --  --  Serratia marcescens          ===================== IMAGING STUDIES ===================  Radiology personally reviewed.    ====================ASSESSMENT AND PLAN ================      ====================== NEUROLOGY=======================  Pain control with PCA / PCEA / Tylenol IV / Toradol / Percocet  Pt is on Precedex for agitation  Pt is sedated with Propofol / Fentanyl    ==================== RESPIRATORY========================  Pt is on            L nasal canula / Face tent____% FiO2  Comfortable, no evidence of distress.  Using incentive spirometry & doing                ml  Monitor chest tube output  Chest tube to suction / water seal	    Mechanical Ventilation:    Mechanical ventilator status assessed & settings reviewed  Continue bronchodilators, pulmonary toilet  Head of bed elevation to 30-40 degrees    ====================CARDIOVASCULAR=====================  Continue hemodynamic monitoring/ telemetry  Not on any pressors  Continue cardiovascular / antihypertensive medications    ===================== RENAL ============================  Continue LR 30CC/hr      D/C IVF  Monitor I/Os, BUN/ Cr  and electrolytes  D/C Soriano      Keep Soriano for UO monitoring  BPH: Continue Flomax/ Finasteride      ==================== GASTROINTESTINAL===================  On regular diet, tolerating well  Continue GI prophylaxis with Pepcid / Protonix  Continue Zofran / Reglan for nausea - PRN	  NPO    =======================    ENDOCRIN  =====================  Glycemic monitoring  F/S with coverage  ===================HEMATOLOGIC/ONCOLOGIC =============  Monitor chest tube output. No signs of active bleeding.   Follow CBC, coags  in AM  DVT prophylaxis with SCD, sc Heparin    ========================INFECTIOUS DISEASE===============  No signs of infection. Monitor for fever / leukocytosis.  All surgical incision / chest tube  sites look clean  D/C Soriano      Pertinent clinical, laboratory, radiographic, hemodynamic, echocardiographic, respiratory data, microbiologic data and chart were reviewed and analyzed frequently throughout the course of the day and night. GI and DVT prophylaxis, glycemic control, head of bed elevation and skin care issues were addressed.  Patient seen, examined and plan discussed with CT Surgery / CTICU team during rounds.  Pt remains critically ill in imminent risk of  deterioration and requires very careful cardio- pulmonary monitoring and support.    I have spent               minutes of critical care time with this pt between            am/pm    and               am/ pm         minutes spent on total encounter; more than 50% of the visit was spent counseling and/or coordinating care by the attending physician.        ALVARO Snowden MD        HPI  83 y.o. female with hx of COPD, c/o SOB, dyspnea on exertion, fatigue, weight loss of 15 lbs in last 6 months, reports occasional nonproductive cough, s/p CXR, followed by CT scan, reports multiple right lung nodules noted admitted  for lung resection.    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  Yesterday OOB to chair, breathing comfortably with minimal pain. Ambulated several times on hallway with assistance. . Denies SOB, no palpitations, no nausea/ no vomiting, no dizziness.     PAST MEDICAL & SURGICAL HISTORY:  Lung nodules  COPD (Chronic Obstructive Pulmonary Disease): diagnosed in ~2013  Colon Polyps  History of Osteoporosis  History of Cataract Surgery: left eye  Hip Replacement: bilateral               Home Medications:  Advair Diskus 250 mcg-50 mcg inhalation powder: 1 puff(s) inhaled 2 times a day (27 Aug 2018 12:42)  Spiriva 18 mcg inhalation capsule: 1 cap(s) inhaled once a day (27 Aug 2018 12:42)    Allergies  No Known Allergies    ***VITAL SIGNS:  Vital Signs Last 24 Hrs  T(C): 36.6 (10 Sep 2018 04:00), Max: 36.9 (09 Sep 2018 12:00)  T(F): 97.8 (10 Sep 2018 04:00), Max: 98.4 (09 Sep 2018 12:00)  HR: 67 (10 Sep 2018 03:00) (64 - 78)  BP: 143/60 (10 Sep 2018 03:00) (114/555 - 168/57)  BP(mean): 80 (10 Sep 2018 03:00) (68 - 777)  RR: 22 (10 Sep 2018 03:00) (20 - 28)  SpO2: 96% (10 Sep 2018 03:00) (91% - 99%)    I/Os:  08 Sep 2018 07:01  -  09 Sep 2018 07:00  --------------------------------------------------------  IN:    Enteral Tube Flush: 240 mL    IV PiggyBack: 562.5 mL    octreotide  Infusion: 288 mL    Vivonex RTH: 1320 mL  Total IN: 2410.5 mL    OUT:    Indwelling Catheter - Urethral: 2030 mL  Total OUT: 2030 mL    Total NET: 380.5 mL      09 Sep 2018 07:01  -  10 Sep 2018 04:49  --------------------------------------------------------  IN:    Enteral Tube Flush: 150 mL    IV PiggyBack: 350 mL    octreotide  Infusion: 252 mL    Vivonex RTH: 880 mL  Total IN: 1632 mL    OUT:    Indwelling Catheter - Urethral: 2110 mL  Total OUT: 2110 mL    Total NET: -478 mL  =======================  MEDICATIONS  ===================  MEDICATIONS  (STANDING):  apixaban 2.5 milliGRAM(s) Oral every 12 hours  docusate sodium Liquid 100 milliGRAM(s) Oral three times a day  dornase selina Solution 2.5 milliGRAM(s) Inhalation daily  famotidine    Tablet 20 milliGRAM(s) Oral daily  fluconAZOLE IVPB      fluconAZOLE IVPB 200 milliGRAM(s) IV Intermittent every 24 hours  ipratropium    for Nebulization 500 MICROGram(s) Nebulizer every 6 hours  levalbuterol Inhalation 0.63 milliGRAM(s) Inhalation every 6 hours  medium chain triglycerides Oil 15 milliLiter(s) Oral three times a day  meropenem  IVPB 1000 milliGRAM(s) IV Intermittent every 8 hours  metoprolol tartrate 25 milliGRAM(s) Oral every 8 hours  octreotide  Infusion 60 MICROgram(s)/Hr (12 mL/Hr) IV Continuous <Continuous>  potassium phosphate / sodium phosphate powder 1 Packet(s) Oral four times a day  potassium phosphate / sodium phosphate powder 1 Packet(s) Oral every 6 hours    MEDICATIONS  (PRN):  melatonin 3 milliGRAM(s) Oral at bedtime PRN Sleep  oxyCODONE    Solution 5 milliGRAM(s) Oral every 4 hours PRN Moderate Pain (4 - 6)    =================== PATIENT CARE ACCESS DEVICES ==========  Peripheral IV(+)			  Urinary Catheter,(+)   ======================= PHYSICAL EXAM===================  General:            Appears deconditioned, Comfortable, Awake, alert, not in any distress  Neuro:              Moving all extremities to commands. No focal deficits	  HEENT:                           ELE  Respiratory:	Slightly decreased breath sounds on right lower lung field                            No rales, few  rhonchi, no wheezing. Effort even and unlabored.                           Right chest surgical site - clean  CV:		Regular rate and rhythm. Normal S1/S2.   Abdomen:         Soft,  nontender, not-distended. Bowel sounds present ; PEG site - clean  Skin:		No rash.  Extremities:	Warm, no cyanosis or edema.  Palpable pulses    ============================ LABS =======================                        11.7   9.55  )-----------( 476      ( 10 Sep 2018 03:00 )             33.8     09-10    139  |  99  |  18  ----------------------------<  101<H>  4.3   |  29  |  0.38<L>    Ca    8.8      10 Sep 2018 03:00  Phos  2.3     09-10  Mg     2.1     09-10    TPro  5.8<L>  /  Alb  2.5<L>  /  TBili  0.3  /  DBili  x   /  AST  25  /  ALT  19  /  AlkPhos  120  09-09        PT/INR - ( 10 Sep 2018 03:00 )   PT: 13.0 SEC;   INR: 1.17   )  PTT:23.8 SEC    BLOOD VENOUS  09-04-18 --  --  --  BRONCHIAL LAVAGE  08-31-18 --  --  Serratia marcescens  PLEURAL FLUID  08-31-18 --  --    NOS^No Organisms Seen  WBC^White Blood Cells  QNTY CELLS IN GRAM STAIN: FEW (2+)    BRONCHIAL LAVAGE  08-30-18 --  --  Serratia marcescens    ===================== IMAGING STUDIES ===================  Radiology personally reviewed.  < from: Xray Chest 1 View- PORTABLE-Routine (09.09.18 @ 07:56) >  ICLINICAL INDICATION: Pleural effusion / Aspiration pneumonia.  FINDINGS:   Cardiac silhouette not well evaluated. Small right pleural effusion with   adjacent opacity is unchanged. Lungs otherwise clear. No pneumothorax.  IMPRESSION:   No change from prior study.  < end of copied text >  ====================ASSESSMENT AND PLAN ================  83 Sarai s/p RVATS /   Right upper lobectomy   08/27/2018, experiencing  pain with deep breathing. 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)                   Bronchoscopy 8/30, 08/31, 09/01/18                    PEG 9/05/18  Issues:   Aspiration Pneumonia/+Serratia marcescens  Atelectasis/ small right pleural effusion  Shortness of breath / Bronchospasm / COPD exacerbation  postop SVT / a-FIB with pauses              Postop pain              HTN              Dysphagia              hypophosphatemia- supplemented    ====================== NEUROLOGY=======================  Pain control with Tylenol IV  Preferably no narcotics  ==================== RESPIRATORY========================  Pt is on   1-2  L nasal canula   Comfortable, no evidence of distress.  Using incentive spirometry   Continue bronchodilators, pulmonary toilet  dornase selina Solution 2.5 milliGRAM(s) Inhalation daily  ipratropium    for Nebulization 500 MICROGram(s) Nebulizer every 6 hours  levalbuterol Inhalation 0.63 milliGRAM(s) Inhalation every 6 hours  Head of bed elevation to 30-40 degrees  OOB, ambulation  Possible bronch today if CXR today not improving  ====================CARDIOVASCULAR=====================  Continue hemodynamic monitoring/ telemetry  Continue cardiovascular / antihypertensive medications  Postop SVT / A-fb: on  Lopressor 25mg q8hrs via PEG, increase as tolerated &  no more pauses.  Started Eliquis for anticoagulation 3 days ago as per EP recs ( on hold since yesterday for possible bronch today   HTN:  Increase lopressor as tolerated  ===================== RENAL ============================  Monitor I/Os, BUN/ Cr  and electrolytes  Keep Soriano for UO monitoring- likely d/c later today  ==================== GASTROINTESTINAL===================  PEG feeds on hold since midnight for possible bronchoscopy today as d/w dr Gallegos  yesterday  Continue GI prophylaxis with Pepcid   Zofran / Reglan for nausea - PRN	  NPO  ? R  Chylothorax   - On Vivonex  + MCT oil and   Octreotide drip  =======================    ENDOCRIN  =====================  Glycemic monitoring  F/S with coverage  ===================HEMATOLOGIC/ONCOLOGIC =============   No signs of active bleeding.   Follow CBC, coags  in AM  DVT prophylaxis with SCD and AC with Eliquis fro SVT/Afib   ========================INFECTIOUS DISEASE===============  No signs of new infection. Monitor for fever / leukocytosis.  All surgical incision / chest tube  sites look clean  Likely D/C Soriano later today   Rt lower lobe Serratia pneumonia and oral candidasis : On Meropenem  / Diflucan,  to be completed on 09/12    Pertinent clinical, laboratory, radiographic, hemodynamic, echocardiographic, respiratory data, microbiologic data and chart were reviewed and analyzed frequently throughout the course of the day and night. GI and DVT prophylaxis, glycemic control, head of bed elevation and skin care issues were addressed.  Patient seen, examined and plan discussed with CT Surgery / CTICU team during rounds.  Pt remains critically ill in imminent risk of  deterioration and requires very careful cardio- pulmonary monitoring and support.    I have spent   35 minutes of critical care time with this pt between   12  am   and      8  am         minutes spent on total encounter; more than 50% of the visit was spent counseling and/or coordinating care by the attending physician.    Pt's status discussed with family at bedside, updated status LIAM VELOZ          [id number]      HPI  83 y.o. female with hx of COPD, c/o SOB, dyspnea on exertion, fatigue, weight loss of 15 lbs in last 6 months, reports occasional nonproductive cough, s/p CXR, followed by CT scan, reports multiple right lung nodules noted admitted  for lung resection.    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 Initially  400 ml of clear serous fluid evacuated, then additional > 200 ml drained through the day and overnight. Pt is deconditioned and weak but was able to ambulate on hallway x2   with assistance. Remains  NPO with PEG feeds at goal ( Vivonex)   OOB to chair, breathing comfortably with minimal pain.  Denies  SOB, no palpitations, no nausea/ no vomiting, no dizziness  Balderrama reinserted again on 9/10/18 for retention and will need to stay in for now.        PAST MEDICAL & SURGICAL HISTORY:  Lung nodules  COPD (Chronic Obstructive Pulmonary Disease): diagnose in ~2013  Colon Polyps  History of Osteoporosis  History of Cataract Surgery: left eye  Hip Replacement: bilateral    Allergies    No Known Allergies    Intolerances            ***VITAL SIGNS:  Vital Signs Last 24 Hrs  T(C): 36.7 (12 Sep 2018 00:00), Max: 36.8 (11 Sep 2018 16:00)  T(F): 98 (12 Sep 2018 00:00), Max: 98.3 (11 Sep 2018 16:00)  HR: 69 (12 Sep 2018 03:00) (63 - 93)  BP: 145/57 (12 Sep 2018 03:00) (121/80 - 162/60)  BP(mean): 78 (12 Sep 2018 03:00) (71 - 111)  RR: 20 (12 Sep 2018 03:00) (18 - 28)  SpO2: 97% (12 Sep 2018 03:00) (91% - 100%)    I/Os:   I&O's Detail    10 Sep 2018 07:01  -  11 Sep 2018 07:00  --------------------------------------------------------  IN:    Enteral Tube Flush: 290 mL    IV PiggyBack: 225 mL    octreotide  Infusion: 288 mL    Vivonex RTH: 825 mL  Total IN: 1628 mL    OUT:    Indwelling Catheter - Urethral: 1575 mL  Total OUT: 1575 mL    Total NET: 53 mL      11 Sep 2018 07:01  -  12 Sep 2018 03:52  --------------------------------------------------------  IN:    Enteral Tube Flush: 60 mL    IV PiggyBack: 150 mL    octreotide  Infusion: 240 mL    Vivonex RTH: 675 mL  Total IN: 1125 mL    OUT:    Chest Tube: 570 mL    Indwelling Catheter - Urethral: 1580 mL  Total OUT: 2150 mL    Total NET: -1025 mL          CAPILLARY BLOOD GLUCOSE          =======================  MEDICATIONS  ===================  MEDICATIONS  (STANDING):  apixaban 2.5 milliGRAM(s) Oral every 12 hours  docusate sodium Liquid 100 milliGRAM(s) Oral three times a day  dornase selina Solution 2.5 milliGRAM(s) Inhalation daily  famotidine    Tablet 20 milliGRAM(s) Oral daily  fluconAZOLE IVPB      fluconAZOLE IVPB 200 milliGRAM(s) IV Intermittent every 24 hours  ipratropium    for Nebulization 500 MICROGram(s) Nebulizer every 6 hours  levalbuterol Inhalation 0.63 milliGRAM(s) Inhalation every 6 hours  medium chain triglycerides Oil 15 milliLiter(s) Oral three times a day  meropenem  IVPB 1000 milliGRAM(s) IV Intermittent every 8 hours  metoprolol tartrate 25 milliGRAM(s) Oral every 8 hours  octreotide  Infusion 60 MICROgram(s)/Hr (12 mL/Hr) IV Continuous <Continuous>  sodium chloride 3%  Inhalation 4 milliLiter(s) Inhalation every 8 hours    MEDICATIONS  (PRN):  acetaminophen  IVPB .. 1000 milliGRAM(s) IV Intermittent once PRN Temp greater or equal to 38C (100.4F), Moderate Pain (4 - 6)  melatonin 3 milliGRAM(s) Oral at bedtime PRN Sleep  oxyCODONE    Solution 5 milliGRAM(s) Oral every 4 hours PRN Moderate Pain (4 - 6)      ======================VENTILATOR SETTINGS  ==============      =================== PATIENT CARE ACCESS DEVICES ==========  Peripheral IV  Central Venous Line	R	L	IJ	Fem	SC			Placed:   Arterial Line	R	L	PT	DP	Fem	Rad	Ax	Placed:   Midline:				  Urinary Catheter, Date Placed:   Necessity of urinary, arterial, and venous catheters discussed    ======================= PHYSICAL EXAM===================  General:                         Comfortable, Awake, alert, not in any distress  Neuro:                            Moving all extremities to commands. No focal deficits	  HEENT:                           ELE/ ETT/ NGT/ trach  Respiratory:	Lungs clear on auscultation bilaterally with good aeration.                                           No rales, rhonchi, no wheezing. Effort even and unlabored.  CV:		Regular rate and rhythm. Normal S1/S2. No murmurs  Abdomen:	                     Soft,  nontender, not-distended. Bowel sounds present / absent.   Skin:		No rash.  Extremities:	Warm, no cyanosis or edema.  Palpable pulses    ============================ LABS =======================                        12.5   9.68  )-----------( 470      ( 11 Sep 2018 04:30 )             36.7     09-11    141  |  97<L>  |  15  ----------------------------<  111<H>  4.5   |  32<H>  |  0.48<L>    Ca    9.0      11 Sep 2018 14:07  Phos  2.8     09-11  Mg     2.0     09-11    TPro  6.0  /  Alb  2.8<L>  /  TBili  0.5  /  DBili  x   /  AST  25  /  ALT  26  /  AlkPhos  129<H>  09-11    LIVER FUNCTIONS - ( 11 Sep 2018 14:07 )  Alb: 2.8 g/dL / Pro: 6.0 g/dL / ALK PHOS: 129 u/L / ALT: 26 u/L / AST: 25 u/L / GGT: x                 BLOOD VENOUS  09-04-18 --  --  --      BRONCHIAL LAVAGE  08-31-18 --  --  Serratia marcescens      PLEURAL FLUID  08-31-18 --  --    NOS^No Organisms Seen  WBC^White Blood Cells  QNTY CELLS IN GRAM STAIN: FEW (2+)      BRONCHIAL LAVAGE  08-30-18 --  --  Serratia marcescens          ===================== IMAGING STUDIES ===================  Radiology personally reviewed.    ====================ASSESSMENT AND PLAN ================      ====================== NEUROLOGY=======================  Pain control with PCA / PCEA / Tylenol IV / Toradol / Percocet  Pt is on Precedex for agitation  Pt is sedated with Propofol / Fentanyl    ==================== RESPIRATORY========================  Pt is on            L nasal canula / Face tent____% FiO2  Comfortable, no evidence of distress.  Using incentive spirometry & doing                ml  Monitor chest tube output  Chest tube to suction / water seal	    Mechanical Ventilation:    Mechanical ventilator status assessed & settings reviewed  Continue bronchodilators, pulmonary toilet  Head of bed elevation to 30-40 degrees    ====================CARDIOVASCULAR=====================  Continue hemodynamic monitoring/ telemetry  Not on any pressors  Continue cardiovascular / antihypertensive medications    ===================== RENAL ============================  Continue LR 30CC/hr      D/C IVF  Monitor I/Os, BUN/ Cr  and electrolytes  D/C Balderrama      Keep Balderrama for UO monitoring  BPH: Continue Flomax/ Finasteride      ==================== GASTROINTESTINAL===================  On regular diet, tolerating well  Continue GI prophylaxis with Pepcid / Protonix  Continue Zofran / Reglan for nausea - PRN	  NPO    =======================    ENDOCRIN  =====================  Glycemic monitoring  F/S with coverage  ===================HEMATOLOGIC/ONCOLOGIC =============  Monitor chest tube output. No signs of active bleeding.   Follow CBC, coags  in AM  DVT prophylaxis with SCD, sc Heparin    ========================INFECTIOUS DISEASE===============  No signs of infection. Monitor for fever / leukocytosis.  All surgical incision / chest tube  sites look clean  D/C Balderrama      Pertinent clinical, laboratory, radiographic, hemodynamic, echocardiographic, respiratory data, microbiologic data and chart were reviewed and analyzed frequently throughout the course of the day and night. GI and DVT prophylaxis, glycemic control, head of bed elevation and skin care issues were addressed.  Patient seen, examined and plan discussed with CT Surgery / CTICU team during rounds.  Pt remains critically ill in imminent risk of  deterioration and requires very careful cardio- pulmonary monitoring and support.    I have spent               minutes of critical care time with this pt between            am/pm    and               am/ pm         minutes spent on total encounter; more than 50% of the visit was spent counseling and/or coordinating care by the attending physician.        ALVARO Snowden MD        PAST MEDICAL & SURGICAL HISTORY:  Lung nodules  COPD (Chronic Obstructive Pulmonary Disease): diagnosed in ~2013  Colon Polyps  History of Osteoporosis  History of Cataract Surgery: left eye  Hip Replacement: bilateral               Home Medications:  Advair Diskus 250 mcg-50 mcg inhalation powder: 1 puff(s) inhaled 2 times a day (27 Aug 2018 12:42)  Spiriva 18 mcg inhalation capsule: 1 cap(s) inhaled once a day (27 Aug 2018 12:42)    Allergies  No Known Allergies    ***VITAL SIGNS:  Vital Signs Last 24 Hrs  T(C): 36.6 (10 Sep 2018 04:00), Max: 36.9 (09 Sep 2018 12:00)  T(F): 97.8 (10 Sep 2018 04:00), Max: 98.4 (09 Sep 2018 12:00)  HR: 67 (10 Sep 2018 03:00) (64 - 78)  BP: 143/60 (10 Sep 2018 03:00) (114/555 - 168/57)  BP(mean): 80 (10 Sep 2018 03:00) (68 - 777)  RR: 22 (10 Sep 2018 03:00) (20 - 28)  SpO2: 96% (10 Sep 2018 03:00) (91% - 99%)    I/Os:  08 Sep 2018 07:01  -  09 Sep 2018 07:00  --------------------------------------------------------  IN:    Enteral Tube Flush: 240 mL    IV PiggyBack: 562.5 mL    octreotide  Infusion: 288 mL    Vivonex RTH: 1320 mL  Total IN: 2410.5 mL    OUT:    Indwelling Catheter - Urethral: 2030 mL  Total OUT: 2030 mL    Total NET: 380.5 mL      09 Sep 2018 07:01  -  10 Sep 2018 04:49  --------------------------------------------------------  IN:    Enteral Tube Flush: 150 mL    IV PiggyBack: 350 mL    octreotide  Infusion: 252 mL    Vivonex RTH: 880 mL  Total IN: 1632 mL    OUT:    Indwelling Catheter - Urethral: 2110 mL  Total OUT: 2110 mL    Total NET: -478 mL  =======================  MEDICATIONS  ===================  MEDICATIONS  (STANDING):  apixaban 2.5 milliGRAM(s) Oral every 12 hours  docusate sodium Liquid 100 milliGRAM(s) Oral three times a day  dornase selina Solution 2.5 milliGRAM(s) Inhalation daily  famotidine    Tablet 20 milliGRAM(s) Oral daily  fluconAZOLE IVPB      fluconAZOLE IVPB 200 milliGRAM(s) IV Intermittent every 24 hours  ipratropium    for Nebulization 500 MICROGram(s) Nebulizer every 6 hours  levalbuterol Inhalation 0.63 milliGRAM(s) Inhalation every 6 hours  medium chain triglycerides Oil 15 milliLiter(s) Oral three times a day  meropenem  IVPB 1000 milliGRAM(s) IV Intermittent every 8 hours  metoprolol tartrate 25 milliGRAM(s) Oral every 8 hours  octreotide  Infusion 60 MICROgram(s)/Hr (12 mL/Hr) IV Continuous <Continuous>  potassium phosphate / sodium phosphate powder 1 Packet(s) Oral four times a day  potassium phosphate / sodium phosphate powder 1 Packet(s) Oral every 6 hours    MEDICATIONS  (PRN):  melatonin 3 milliGRAM(s) Oral at bedtime PRN Sleep  oxyCODONE    Solution 5 milliGRAM(s) Oral every 4 hours PRN Moderate Pain (4 - 6)    =================== PATIENT CARE ACCESS DEVICES ==========  Peripheral IV(+)			  Urinary Catheter,(+)   ======================= PHYSICAL EXAM===================  General:            Appears deconditioned, Comfortable, Awake, alert, not in any distress  Neuro:              Moving all extremities to commands. No focal deficits	  HEENT:                           ELE  Respiratory:	Slightly decreased breath sounds on right lower lung field                            No rales, few  rhonchi, no wheezing. Effort even and unlabored.                           Right chest surgical site - clean  CV:		Regular rate and rhythm. Normal S1/S2.   Abdomen:         Soft,  nontender, not-distended. Bowel sounds present ; PEG site - clean  Skin:		No rash.  Extremities:	Warm, no cyanosis or edema.  Palpable pulses    ============================ LABS =======================                        11.7   9.55  )-----------( 476      ( 10 Sep 2018 03:00 )             33.8     09-10    139  |  99  |  18  ----------------------------<  101<H>  4.3   |  29  |  0.38<L>    Ca    8.8      10 Sep 2018 03:00  Phos  2.3     09-10  Mg     2.1     09-10    TPro  5.8<L>  /  Alb  2.5<L>  /  TBili  0.3  /  DBili  x   /  AST  25  /  ALT  19  /  AlkPhos  120  09-09        PT/INR - ( 10 Sep 2018 03:00 )   PT: 13.0 SEC;   INR: 1.17   )  PTT:23.8 SEC    BLOOD VENOUS  09-04-18 --  --  --  BRONCHIAL LAVAGE  08-31-18 --  --  Serratia marcescens  PLEURAL FLUID  08-31-18 --  --    NOS^No Organisms Seen  WBC^White Blood Cells  QNTY CELLS IN GRAM STAIN: FEW (2+)    BRONCHIAL LAVAGE  08-30-18 --  --  Serratia marcescens    ===================== IMAGING STUDIES ===================  Radiology personally reviewed.  < from: Xray Chest 1 View- PORTABLE-Routine (09.09.18 @ 07:56) >  ICLINICAL INDICATION: Pleural effusion / Aspiration pneumonia.  FINDINGS:   Cardiac silhouette not well evaluated. Small right pleural effusion with   adjacent opacity is unchanged. Lungs otherwise clear. No pneumothorax.  IMPRESSION:   No change from prior study.  < end of copied text >  ====================ASSESSMENT AND PLAN ================  83 yFemale s/p RVATS /   Right upper lobectomy   08/27/2018, experiencing  pain with deep breathing. 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)                   Bronchoscopy 8/30, 08/31, 09/01/18                    PEG 9/05/18  Issues:   Aspiration Pneumonia/+Serratia marcescens  Atelectasis/ small right pleural effusion  Shortness of breath / Bronchospasm / COPD exacerbation  postop SVT / a-FIB with pauses              Postop pain              HTN              Dysphagia              hypophosphatemia- supplemented    ====================== NEUROLOGY=======================  Pain control with Tylenol IV  Preferably no narcotics  ==================== RESPIRATORY========================  Pt is on   1-2  L nasal canula   Comfortable, no evidence of distress.  Using incentive spirometry   Continue bronchodilators, pulmonary toilet  dornase selina Solution 2.5 milliGRAM(s) Inhalation daily  ipratropium    for Nebulization 500 MICROGram(s) Nebulizer every 6 hours  levalbuterol Inhalation 0.63 milliGRAM(s) Inhalation every 6 hours  Head of bed elevation to 30-40 degrees  OOB, ambulation  Possible bronch today if CXR today not improving  ====================CARDIOVASCULAR=====================  Continue hemodynamic monitoring/ telemetry  Continue cardiovascular / antihypertensive medications  Postop SVT / A-fb: on  Lopressor 25mg q8hrs via PEG, increase as tolerated &  no more pauses.  Started Eliquis for anticoagulation 3 days ago as per EP recs ( on hold since yesterday for possible bronch today   HTN:  Increase lopressor as tolerated  ===================== RENAL ============================  Monitor I/Os, BUN/ Cr  and electrolytes  Keep Balderrama for UO monitoring- likely d/c later today  ==================== GASTROINTESTINAL===================  PEG feeds on hold since midnight for possible bronchoscopy today as d/w dr Zeltsmroderick  yesterday  Continue GI prophylaxis with Pepcid   Zofran / Reglan for nausea - PRN	  NPO  ? R  Chylothorax   - On Vivonex  + MCT oil and   Octreotide drip  =======================    ENDOCRIN  =====================  Glycemic monitoring  F/S with coverage  ===================HEMATOLOGIC/ONCOLOGIC =============   No signs of active bleeding.   Follow CBC, coags  in AM  DVT prophylaxis with SCD and AC with Eliquis fro SVT/Afib   ========================INFECTIOUS DISEASE===============  No signs of new infection. Monitor for fever / leukocytosis.  All surgical incision / chest tube  sites look clean  Likely D/C Balderrama later today   Rt lower lobe Serratia pneumonia and oral candidasis : On Meropenem  / Diflucan,  to be completed on 09/12    Pertinent clinical, laboratory, radiographic, hemodynamic, echocardiographic, respiratory data, microbiologic data and chart were reviewed and analyzed frequently throughout the course of the day and night. GI and DVT prophylaxis, glycemic control, head of bed elevation and skin care issues were addressed.  Patient seen, examined and plan discussed with CT Surgery / CTICU team during rounds.  Pt remains critically ill in imminent risk of  deterioration and requires very careful cardio- pulmonary monitoring and support.    I have spent   35 minutes of critical care time with this pt between   12  am   and      8  am         minutes spent on total encounter; more than 50% of the visit was spent counseling and/or coordinating care by the attending physician.    Pt's status discussed with family at bedside, updated status LIAM VELOZ          [id number]      HPI  83 y.o. female with hx of COPD, c/o SOB, dyspnea on exertion, fatigue, weight loss of 15 lbs in last 6 months, reports occasional nonproductive cough, s/p CXR, followed by CT scan, reports multiple right lung nodules noted admitted  for lung resection.    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 Initially  400 ml of clear serous fluid evacuated, then additional > 200 ml drained through the day and overnight. Pt is deconditioned and weak but was able to ambulate on hallway x2  with assistance. Remains  NPO with PEG feeds at goal ( Vivonex)   OOB to chair, breathing comfortably with minimal pain.  Denies  SOB, no palpitations, no nausea/ no vomiting, no dizziness  Balderrama reinserted again on 9/10/18 for retention .      PAST MEDICAL & SURGICAL HISTORY:  Lung nodules  COPD (Chronic Obstructive Pulmonary Disease): diagnosed in ~2013  Colon Polyps  History of Osteoporosis  History of Cataract Surgery: left eye  Hip Replacement: bilateral               Home Medications:  Advair Diskus 250 mcg-50 mcg inhalation powder: 1 puff(s) inhaled 2 times a day (27 Aug 2018 12:42)  Spiriva 18 mcg inhalation capsule: 1 cap(s) inhaled once a day (27 Aug 2018 12:42)    Allergies  No Known Allergies      ***VITAL SIGNS:  Vital Signs Last 24 Hrs  T(C): 36.7 (12 Sep 2018 00:00), Max: 36.8 (11 Sep 2018 16:00)  T(F): 98 (12 Sep 2018 00:00), Max: 98.3 (11 Sep 2018 16:00)  HR: 69 (12 Sep 2018 03:00) (63 - 93)  BP: 145/57 (12 Sep 2018 03:00) (121/80 - 162/60)  BP(mean): 78 (12 Sep 2018 03:00) (71 - 111)  RR: 20 (12 Sep 2018 03:00) (18 - 28)  SpO2: 97% (12 Sep 2018 03:00) (91% - 100%)    I/Os:   I&O's Detail  10 Sep 2018 07:01  -  11 Sep 2018 07:00  --------------------------------------------------------  IN:    Enteral Tube Flush: 290 mL    IV PiggyBack: 225 mL    octreotide  Infusion: 288 mL    Vivonex RTH: 825 mL  Total IN: 1628 mL    OUT:    Indwelling Catheter - Urethral: 1575 mL  Total OUT: 1575 mL    Total NET: 53 mL      11 Sep 2018 07:01  -  12 Sep 2018 03:52  --------------------------------------------------------  IN:    Enteral Tube Flush: 60 mL    IV PiggyBack: 150 mL    octreotide  Infusion: 240 mL    Vivonex RTH: 675 mL  Total IN: 1125 mL    OUT:    Chest Tube: 570 mL    Indwelling Catheter - Urethral: 1580 mL  Total OUT: 2150 mL    Total NET: -1025 mL    =======================  MEDICATIONS  ===================  MEDICATIONS  (STANDING):  apixaban 2.5 milliGRAM(s) Oral every 12 hours  docusate sodium Liquid 100 milliGRAM(s) Oral three times a day  dornase selina Solution 2.5 milliGRAM(s) Inhalation daily  famotidine    Tablet 20 milliGRAM(s) Oral daily  fluconAZOLE IVPB      fluconAZOLE IVPB 200 milliGRAM(s) IV Intermittent every 24 hours  ipratropium    for Nebulization 500 MICROGram(s) Nebulizer every 6 hours  levalbuterol Inhalation 0.63 milliGRAM(s) Inhalation every 6 hours  medium chain triglycerides Oil 15 milliLiter(s) Oral three times a day  meropenem  IVPB 1000 milliGRAM(s) IV Intermittent every 8 hours  metoprolol tartrate 25 milliGRAM(s) Oral every 8 hours  octreotide  Infusion 60 MICROgram(s)/Hr (12 mL/Hr) IV Continuous <Continuous>  sodium chloride 3%  Inhalation 4 milliLiter(s) Inhalation every 8 hours    MEDICATIONS  (PRN):  acetaminophen  IVPB .. 1000 milliGRAM(s) IV Intermittent once PRN Temp greater or equal to 38C (100.4F), Moderate Pain (4 - 6)  melatonin 3 milliGRAM(s) Oral at bedtime PRN Sleep  oxyCODONE    Solution 5 milliGRAM(s) Oral every 4 hours PRN Moderate Pain (4 - 6)      =================== PATIENT CARE ACCESS DEVICES ==========  Peripheral IV (+)				  Urinary Catheter, Date Placed: 9/10/18   Necessity of urinary, arterial, and venous catheters discussed    ======================= PHYSICAL EXAM===================  General:            Appears deconditioned, Comfortable, Awake, alert, not in any distress  Neuro:              Moving all extremities to commands. No focal deficits	  HEENT:                           ELE  Respiratory:	 Equal breath sounds B/L                           No rales, few  rhonchi, no wheezing. Effort even and unlabored.                           Right chest surgical site - clean, right chest tube site - clean  CV:		Regular rate and rhythm. Normal S1/S2.   Abdomen:         Soft,  nontender, not-distended. Bowel sounds present ; PEG site - clean  Skin:		No rash.  Extremities:	Warm, no cyanosis or edema.  Palpable pulses    ============================ LABS =======================                          11.8   10.17 )-----------( 464      ( 12 Sep 2018 03:55 )             35.8                       12.5   9.68  )-----------( 470      ( 11 Sep 2018 04:30 )             36.7     09-12    139  |  98  |  17  ----------------------------<  143<H>  3.9   |  29  |  0.41<L>    Ca    8.7      12 Sep 2018 03:55  Phos  3.4     09-12  Mg     2.0     09-12    TPro  6.0  /  Alb  2.8<L>  /  TBili  0.5  /  DBili  x   /  AST  25  /  ALT  26  /  AlkPhos  129<H>  09-11 09-11    141  |  97<L>  |  15  ----------------------------<  111<H>  4.5   |  32<H>  |  0.48<L>    Ca    9.0      11 Sep 2018 14:07  Phos  2.8     09-11  Mg     2.0     09-11    TPro  6.0  /  Alb  2.8<L>  /  TBili  0.5  /  DBili  x   /  AST  25  /  ALT  26  /  AlkPhos  129<H>  09-11    LIVER FUNCTIONS - ( 11 Sep 2018 14:07 )  Alb: 2.8 g/dL / Pro: 6.0 g/dL / ALK PHOS: 129 u/L / ALT: 26 u/L / AST: 25 u/L / GGT: x           BLOOD VENOUS  09-04-18 --  --  --  BRONCHIAL LAVAGE  08-31-18 --  --  Serratia marcescens    PLEURAL FLUID  08-31-18 --  --    NOS^No Organisms Seen  WBC^White Blood Cells  QNTY CELLS IN GRAM STAIN: FEW (2+)    BRONCHIAL LAVAGE  08-30-18 --  --  Serratia marcescens    ===================== IMAGING STUDIES ===================  Radiology personally reviewed.  < from: Xray Chest 1 View- PORTABLE-Urgent (09.11.18 @ 10:44) >    CLINICAL INFORMATION: Status post right upper lobectomy. Right pleural   effusion/atelectasis.  COMPARISON:  September 10, 2018 at 6:27 AM.  INTERPRETATION:     Heart size and the mediastinum cannot be accurately evaluated on this   projection. The thoracic aorta is calcified.  A small right pleural effusion with likely associated passive atelectasis   appears larger. The left lung is clear. No left pleural effusion seen.  No pneumothorax.    AP portable chest x-ray from September 11, 2018 at 10:29 AM:    CLINICAL INFORMATION: Right pigtail.    The heart is not enlarged. A calcified tortuous thoracic aorta is again   seen.  There is a new right basilar pleural pigtail catheter. No pneumothorax is   seen.  The previous right pleural effusion is smaller. The left lung remains   clear.    IMPRESSION:  New right basilar pleural pigtail catheter. Previous right   pleural effusion is smaller.  No pneumothorax.    < end of copied text >    ====================ASSESSMENT AND PLAN ================  83 yFemale s/p RVATS /   Right upper lobectomy   08/27/2018, experiencing  pain with deep breathing. 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)                   Bronchoscopy 8/30, 08/31, 09/01, 9/11/18                    PEG 9/05/18  Issues:   Aspiration Pneumonia/+Serratia marcescens  Atelectasis/ right pleural effusion  Shortness of breath / Bronchospasm / COPD exacerbation  postop SVT / a-FIB with pauses              Postop pain              HTN              Dysphagia              Malnutrition              hypophosphatemia- supplemented    ====================== NEUROLOGY=======================  Pain control with PRN  Tylenol IV  Preferably no narcotics    ==================== RESPIRATORY========================  Pt is on   1  L nasal canula during the night ; room air during the day  Comfortable, no evidence of distress.  Using incentive spirometry   Monitor chest tube output  Chest tube to suction 	  Continue bronchodilators, pulmonary toilet  dornase selina Solution 2.5 milliGRAM(s) Inhalation daily  ipratropium    for Nebulization 500 MICROGram(s) Nebulizer every 6 hours  levalbuterol Inhalation 0.63 milliGRAM(s) Inhalation every 6 hours  Head of bed elevation to 30-40 degrees  OOB, ambulation    ====================CARDIOVASCULAR=====================  Continue hemodynamic monitoring/ telemetry  Continue cardiovascular / antihypertensive medications  Postop SVT / A-fb: on  Lopressor 25mg q8hrs via PEG, increase as tolerated &  no more pauses.  Started Eliquis for anticoagulation 5 days ago as per EP recs ( possibly will switch to Xarelto for once a day dosing )  HTN:  Increase lopressor as tolerated    ===================== RENAL ============================  Monitor I/Os, BUN/ Cr  and electrolytes   Keep Balderrama for UO monitoring  Balderrama may need to stay in for extended time in view of urinary  retention requiring multiple catheter reinsertion    ==================== GASTROINTESTINAL===================	  NPO  PEG feeds with Vivonex at goal 55ml / hr  Continue GI prophylaxis with Pepcid   Zofran / Reglan for nausea - PRN	  ? R  Chylothorax   - On Vivonex  + MCT oil and   Octreotide drip    =======================    ENDOCRIN  =====================  Glycemic monitoring  F/S with coverage  ===================HEMATOLOGIC/ONCOLOGIC =============  No signs of active bleeding.   Follow CBC, coags   DVT prophylaxis with SCD and AC with Eliquis fro SVT/Afib     ========================INFECTIOUS DISEASE===============  No signs of new infection. Monitor for fever / leukocytosis.  All surgical incision / chest tube  sites look clean   Rt lower lobe Serratia pneumonia and oral candidasis : On Meropenem  / Diflucan,  to be completed on 09/12      Pertinent clinical, laboratory, radiographic, hemodynamic, echocardiographic, respiratory data, microbiologic data and chart were reviewed and analyzed frequently throughout the course of the day and night. GI and DVT prophylaxis, glycemic control, head of bed elevation and skin care issues were addressed.  Patient seen, examined and plan discussed with CT Surgery / CTICU team during rounds.  Pt remains critically ill in imminent risk of  deterioration and requires very careful cardio- pulmonary monitoring and support.    I have spent     35  minutes of critical care time with this pt between 12 am  and     8 am         minutes spent on total encounter; more than 50% of the visit was spent counseling and/or coordinating care by the attending physician.  Pt's status discussed with family at bedside, updated status      ALVARO Snowden MD

## 2018-09-12 NOTE — PROGRESS NOTE ADULT - ASSESSMENT
83 y.o. female with hx of COPD, no significant cardiac history presenting with intraop- postop SVT s/p Right upper lobectomy.  Pt has been c/o fatigue, weight loss of 15 lbs in last 6 months, and reports occasional nonproductive cough.  CXR and subsequent CT scan, reports multiple right lung nodules.  Pt admitted  for lung resection.  Pt underwent lobectomy of right lung  with video-assisted thoracoscopic surgery  on 8/27/2018 and flexible bronchoscopy with bronchopulmonary lavage.  She has a right chest tube in place.      Post op course complicated by SVT and A.fib with pauses and pneumothorax.         Pt currently without fever.  WBC 14.1 on 8/28 --> 10.5.  Pt was given a dose of rocephin on 8/30 for possible pneumonia.  During bedside bronchoscopy pt noted to have copious murky secretions concerning for infection and oropharyngeal candidiases.  ID consult called for further antibiotic managment.     Problem/Plan - 1:    ·	Pna    - Pt s/p RUL lobectomy, s/p bronch/lavage, chest tube to suction.  Noted to have copious purulent secretions and oropharyngeal candidiasis.      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  Repeat bronchoscopy shown to have improved secretions.    No new ID recs at this time.  Complete abx on 9/12        [facility]  [phone]

## 2018-09-12 NOTE — CHART NOTE - NSCHARTNOTEFT_GEN_A_CORE
Source: Patient , nursing, MD, EMR    Diet : NPO with tube feed - Vivonex RTH @ 55ml/hr 24hrs daily     Patient was having diarrhea , feeds on hold 2/2 availability of the formula , received feed but per MD , pt.  CDiff negative , w/ diarrhea , will discuss with surgery attending and if agreeing will adjust formula to Jevity 1.2 if pt. not with chyle leak. Noted pt. ordered for Colace and Ocreotide , per nursing pt. received Colace last night , but pt. not receiving as ordered . Vivonex RTH is with high osmolarity and may trigger intolerance along with pt. noted to be on antibiotic treatment which may trigger loose BM .      Enteral : EN was providing 1320 kcal & 66 gm protein /d        Current Weight: 50.7 kg on 9/11/18; Admit wt. - 50.3 kg ; UBW - 56.6 kg     Pertinent Medications: MEDICATIONS  (STANDING):  apixaban 2.5 milliGRAM(s) Oral every 12 hours  docusate sodium Liquid 100 milliGRAM(s) Oral three times a day  dornase selina Solution 2.5 milliGRAM(s) Inhalation daily  famotidine    Tablet 20 milliGRAM(s) Oral daily  fluconAZOLE IVPB      fluconAZOLE IVPB 200 milliGRAM(s) IV Intermittent every 24 hours  ipratropium    for Nebulization 500 MICROGram(s) Nebulizer every 6 hours  lactated ringers. 1000 milliLiter(s) (30 mL/Hr) IV Continuous <Continuous>  levalbuterol Inhalation 0.63 milliGRAM(s) Inhalation every 6 hours  meropenem  IVPB 1000 milliGRAM(s) IV Intermittent every 8 hours  metoprolol tartrate 25 milliGRAM(s) Oral every 8 hours  octreotide  Infusion 60 MICROgram(s)/Hr (12 mL/Hr) IV Continuous <Continuous>  sodium chloride 3%  Inhalation 4 milliLiter(s) Inhalation every 8 hours    MEDICATIONS  (PRN):  acetaminophen  IVPB .. 1000 milliGRAM(s) IV Intermittent once PRN Temp greater or equal to 38C (100.4F), Moderate Pain (4 - 6)  melatonin 3 milliGRAM(s) Oral at bedtime PRN Sleep  oxyCODONE    Solution 5 milliGRAM(s) Oral every 4 hours PRN Moderate Pain (4 - 6)    Pertinent Labs:  09-12 Na139 mmol/L Glu 143 mg/dL<H> K+ 3.9 mmol/L Cr  0.41 mg/dL<L> BUN 17 mg/dL 09-12 Phos 3.4 mg/dL 09-11 Alb 2.8 g/dL<L> 09-12 PAB 17 mg/dL<L>      Skin: intact    Estimated Needs: - no change since previous assessment     Recommend to resume EN as per MD order - Vivonex RTH @ 55 ml/hr 24hrs if chyle peak present VS Jevity 1.2 @ 50 ml/hr 24hrs daily if there is NO  chyle leak  . md & nursing aware .                         Consider adjusting  Colace if diarrhea persists .     Monitoring and Evaluation:  Tolerance to diet prescription , weights and  follow up per protocol

## 2018-09-12 NOTE — PROGRESS NOTE ADULT - SUBJECTIVE AND OBJECTIVE BOX
Infectious Diseases progress note:    Subjective: NAD, feels weak in general.  No new fevers/chest pain/HA/abd pain/diarrhea    ROS:  CONSTITUTIONAL:  No fever, chills, rigors  CARDIOVASCULAR:  No chest pain or palpitations  RESPIRATORY:   No SOB, cough, dyspnea on exertion.  No wheezing  GASTROINTESTINAL:  No abd pain, N/V, diarrhea/constipation  EXTREMITIES:  No swelling or joint pain  GENITOURINARY:  No burning on urination, increased frequency or urgency.  No flank pain  NEUROLOGIC:  No HA, visual disturbances  SKIN: No rashes    Allergies    No Known Allergies    Intolerances        ANTIBIOTICS/RELEVANT:  antimicrobials  fluconAZOLE IVPB      fluconAZOLE IVPB 200 milliGRAM(s) IV Intermittent every 24 hours  meropenem  IVPB 1000 milliGRAM(s) IV Intermittent every 8 hours    immunologic:    OTHER:  acetaminophen  IVPB .. 1000 milliGRAM(s) IV Intermittent once PRN  apixaban 2.5 milliGRAM(s) Oral every 12 hours  docusate sodium Liquid 100 milliGRAM(s) Oral three times a day  dornase selina Solution 2.5 milliGRAM(s) Inhalation daily  famotidine    Tablet 20 milliGRAM(s) Oral daily  ipratropium    for Nebulization 500 MICROGram(s) Nebulizer every 6 hours  lactated ringers. 1000 milliLiter(s) IV Continuous <Continuous>  levalbuterol Inhalation 0.63 milliGRAM(s) Inhalation every 6 hours  melatonin 3 milliGRAM(s) Oral at bedtime PRN  metoprolol tartrate 25 milliGRAM(s) Oral every 8 hours  oxyCODONE    Solution 5 milliGRAM(s) Oral every 4 hours PRN  sodium chloride 3%  Inhalation 4 milliLiter(s) Inhalation every 8 hours      Objective:  Vital Signs Last 24 Hrs  T(C): 36.7 (12 Sep 2018 20:00), Max: 36.7 (12 Sep 2018 00:00)  T(F): 98 (12 Sep 2018 20:00), Max: 98 (12 Sep 2018 00:00)  HR: 71 (12 Sep 2018 22:00) (64 - 100)  BP: 156/59 (12 Sep 2018 22:00) (115/51 - 156/59)  BP(mean): 82 (12 Sep 2018 22:00) (67 - 94)  RR: 22 (12 Sep 2018 22:00) (19 - 29)  SpO2: 100% (12 Sep 2018 22:00) (91% - 100%)    PHYSICAL EXAM:  Constitutional:NAD  Eyes:ELE, EOMI  Ear/Nose/Throat: no thrush, mucositis.  Moist mucous membranes	  Neck:no JVD, no lymphadenopathy, supple  Respiratory: CTA lanette, rt chest pigtail drain  Cardiovascular: S1S2 RRR, no murmurs  Gastrointestinal:soft, nontender,  nondistended (+) BS  Extremities:no e/e/c  Skin:  no rashes, open wounds or ulcerations        LABS:                        11.8   10.17 )-----------( 464      ( 12 Sep 2018 03:55 )             35.8     09-12    139  |  98  |  17  ----------------------------<  143<H>  3.9   |  29  |  0.41<L>    Ca    8.7      12 Sep 2018 03:55  Phos  3.4     09-12  Mg     2.0     09-12    TPro  6.0  /  Alb  2.8<L>  /  TBili  0.5  /  DBili  x   /  AST  25  /  ALT  26  /  AlkPhos  129<H>  09-11              MICROBIOLOGY:    Culture - Blood (09.04.18 @ 11:42)    Culture - Blood:   NO ORGANISMS ISOLATED    Specimen Source: BLOOD VENOUS          RADIOLOGY & ADDITIONAL STUDIES:    < from: Xray Chest 1 View- PORTABLE-Routine (09.12.18 @ 06:37) >  IMPRESSION:  Status post right thoracotomy with pigtail catheter.    < end of copied text >

## 2018-09-12 NOTE — PROGRESS NOTE ADULT - SUBJECTIVE AND OBJECTIVE BOX
Ms. Hinton is a pleasant 84 yo F former smoker with COPD with recent REED and weight loss found to have RUL nodule concerning for malignancy now s/p RUL lobectomy, with subsequent development of recurrent SVT, found to be in afib/flutter.   Hospital course further complicated as she was found to have 400cc of pleural fluid on US, s/p right pigtail catheter with serous fluid , and bedside bronchoscopy.  Improved secretions now.      Reviewed telemetry - Remains in Sinus rhythm 70's. No atrial arrhytmias detected.   No indication for device therapy at the present time.   Continue Anticoagulation given her history of PAF and CHADS VaSc score of 2 ( Age / gender).   ELiquis 2.5 mg bid  Continue Metop 25 Q8H    Thank you.     Amber Lange,FNP-C  [id number].

## 2018-09-13 DIAGNOSIS — R33.9 RETENTION OF URINE, UNSPECIFIED: ICD-10-CM

## 2018-09-13 LAB
ALBUMIN SERPL ELPH-MCNC: 2.6 G/DL — LOW (ref 3.3–5)
ALP SERPL-CCNC: 123 U/L — HIGH (ref 40–120)
ALT FLD-CCNC: 28 U/L — SIGNIFICANT CHANGE UP (ref 4–33)
AST SERPL-CCNC: 29 U/L — SIGNIFICANT CHANGE UP (ref 4–32)
BILIRUB SERPL-MCNC: 0.4 MG/DL — SIGNIFICANT CHANGE UP (ref 0.2–1.2)
BUN SERPL-MCNC: 18 MG/DL — SIGNIFICANT CHANGE UP (ref 7–23)
CALCIUM SERPL-MCNC: 8.9 MG/DL — SIGNIFICANT CHANGE UP (ref 8.4–10.5)
CHLORIDE SERPL-SCNC: 101 MMOL/L — SIGNIFICANT CHANGE UP (ref 98–107)
CO2 SERPL-SCNC: 29 MMOL/L — SIGNIFICANT CHANGE UP (ref 22–31)
CREAT SERPL-MCNC: 0.39 MG/DL — LOW (ref 0.5–1.3)
GLUCOSE SERPL-MCNC: 136 MG/DL — HIGH (ref 70–99)
HCT VFR BLD CALC: 34.9 % — SIGNIFICANT CHANGE UP (ref 34.5–45)
HGB BLD-MCNC: 11.5 G/DL — SIGNIFICANT CHANGE UP (ref 11.5–15.5)
MCHC RBC-ENTMCNC: 30.2 PG — SIGNIFICANT CHANGE UP (ref 27–34)
MCHC RBC-ENTMCNC: 33 % — SIGNIFICANT CHANGE UP (ref 32–36)
MCV RBC AUTO: 91.6 FL — SIGNIFICANT CHANGE UP (ref 80–100)
NRBC # FLD: 0 — SIGNIFICANT CHANGE UP
PLATELET # BLD AUTO: 443 K/UL — HIGH (ref 150–400)
PMV BLD: 9.5 FL — SIGNIFICANT CHANGE UP (ref 7–13)
POTASSIUM SERPL-MCNC: 4 MMOL/L — SIGNIFICANT CHANGE UP (ref 3.5–5.3)
POTASSIUM SERPL-SCNC: 4 MMOL/L — SIGNIFICANT CHANGE UP (ref 3.5–5.3)
PROT SERPL-MCNC: 5.8 G/DL — LOW (ref 6–8.3)
RBC # BLD: 3.81 M/UL — SIGNIFICANT CHANGE UP (ref 3.8–5.2)
RBC # FLD: 13.9 % — SIGNIFICANT CHANGE UP (ref 10.3–14.5)
SODIUM SERPL-SCNC: 139 MMOL/L — SIGNIFICANT CHANGE UP (ref 135–145)
WBC # BLD: 9.99 K/UL — SIGNIFICANT CHANGE UP (ref 3.8–10.5)
WBC # FLD AUTO: 9.99 K/UL — SIGNIFICANT CHANGE UP (ref 3.8–10.5)

## 2018-09-13 PROCEDURE — 71045 X-RAY EXAM CHEST 1 VIEW: CPT | Mod: 26,77

## 2018-09-13 PROCEDURE — 99233 SBSQ HOSP IP/OBS HIGH 50: CPT

## 2018-09-13 PROCEDURE — 71045 X-RAY EXAM CHEST 1 VIEW: CPT | Mod: 26

## 2018-09-13 RX ADMIN — Medication 500 MICROGRAM(S): at 04:10

## 2018-09-13 RX ADMIN — Medication 500 MICROGRAM(S): at 17:10

## 2018-09-13 RX ADMIN — APIXABAN 2.5 MILLIGRAM(S): 2.5 TABLET, FILM COATED ORAL at 17:05

## 2018-09-13 RX ADMIN — APIXABAN 2.5 MILLIGRAM(S): 2.5 TABLET, FILM COATED ORAL at 05:11

## 2018-09-13 RX ADMIN — MEROPENEM 100 MILLIGRAM(S): 1 INJECTION INTRAVENOUS at 05:11

## 2018-09-13 RX ADMIN — LEVALBUTEROL 0.63 MILLIGRAM(S): 1.25 SOLUTION, CONCENTRATE RESPIRATORY (INHALATION) at 21:35

## 2018-09-13 RX ADMIN — Medication 25 MILLIGRAM(S): at 22:13

## 2018-09-13 RX ADMIN — Medication 500 MICROGRAM(S): at 21:35

## 2018-09-13 RX ADMIN — DORNASE ALFA 2.5 MILLIGRAM(S): 1 SOLUTION RESPIRATORY (INHALATION) at 11:43

## 2018-09-13 RX ADMIN — LEVALBUTEROL 0.63 MILLIGRAM(S): 1.25 SOLUTION, CONCENTRATE RESPIRATORY (INHALATION) at 04:10

## 2018-09-13 RX ADMIN — SODIUM CHLORIDE 4 MILLILITER(S): 9 INJECTION INTRAMUSCULAR; INTRAVENOUS; SUBCUTANEOUS at 11:15

## 2018-09-13 RX ADMIN — Medication 3 MILLIGRAM(S): at 22:13

## 2018-09-13 RX ADMIN — Medication 100 MILLIGRAM(S): at 05:11

## 2018-09-13 RX ADMIN — LEVALBUTEROL 0.63 MILLIGRAM(S): 1.25 SOLUTION, CONCENTRATE RESPIRATORY (INHALATION) at 17:10

## 2018-09-13 RX ADMIN — SODIUM CHLORIDE 4 MILLILITER(S): 9 INJECTION INTRAMUSCULAR; INTRAVENOUS; SUBCUTANEOUS at 16:59

## 2018-09-13 RX ADMIN — Medication 500 MICROGRAM(S): at 11:27

## 2018-09-13 RX ADMIN — Medication 25 MILLIGRAM(S): at 14:09

## 2018-09-13 RX ADMIN — LEVALBUTEROL 0.63 MILLIGRAM(S): 1.25 SOLUTION, CONCENTRATE RESPIRATORY (INHALATION) at 11:27

## 2018-09-13 RX ADMIN — FAMOTIDINE 20 MILLIGRAM(S): 10 INJECTION INTRAVENOUS at 11:08

## 2018-09-13 RX ADMIN — SODIUM CHLORIDE 4 MILLILITER(S): 9 INJECTION INTRAMUSCULAR; INTRAVENOUS; SUBCUTANEOUS at 21:50

## 2018-09-13 RX ADMIN — Medication 25 MILLIGRAM(S): at 05:11

## 2018-09-13 NOTE — CHART NOTE - NSCHARTNOTEFT_GEN_A_CORE
Ms. Hinton is a pleasant 82 yo F former smoker with COPD with recent REED and weight loss found to have RUL nodule concerning for malignancy now s/p RUL lobectomy, with subsequent development of recurrent SVT, found to be in afib/flutter post op.     Reviewed telemetry - Remains in Sinus rhythm 60's. No atrial arrhytmias detected.   No indication for device therapy at the present time.   Recommend Anticoagulation when cleared from surgical stand point given her history of PAF and CHADS VaSc score of 2 ( Age / gender).   On Eliquis Tolerating well.  Continue Metop 25 Q8, may increase if needed    Thank you.     Amber Lange,FNP-C  [id number].

## 2018-09-13 NOTE — PROGRESS NOTE ADULT - SUBJECTIVE AND OBJECTIVE BOX
Infectious Diseases progress note:    Subjective: Pigtail catheter removed.  No new fevers, pt resting.  Pt in urinary retention, still requiring Balderrama    ROS:  CONSTITUTIONAL:  No fever, chills, rigors  CARDIOVASCULAR:  No chest pain or palpitations  RESPIRATORY:   No SOB, cough, dyspnea on exertion.  No wheezing  GASTROINTESTINAL:  No abd pain, N/V, diarrhea/constipation  EXTREMITIES:  No swelling or joint pain  GENITOURINARY:  No burning on urination, increased frequency or urgency.  No flank pain  NEUROLOGIC:  No HA, visual disturbances  SKIN: No rashes    Allergies    No Known Allergies    Intolerances        ANTIBIOTICS/RELEVANT:  antimicrobials    immunologic:    OTHER:  acetaminophen  IVPB .. 1000 milliGRAM(s) IV Intermittent once PRN  apixaban 2.5 milliGRAM(s) Oral every 12 hours  dornase selina Solution 2.5 milliGRAM(s) Inhalation daily  famotidine    Tablet 20 milliGRAM(s) Oral daily  ipratropium    for Nebulization 500 MICROGram(s) Nebulizer every 6 hours  lactated ringers. 1000 milliLiter(s) IV Continuous <Continuous>  levalbuterol Inhalation 0.63 milliGRAM(s) Inhalation every 6 hours  melatonin 3 milliGRAM(s) Oral at bedtime PRN  metoprolol tartrate 25 milliGRAM(s) Oral every 8 hours  oxyCODONE    Solution 5 milliGRAM(s) Oral every 4 hours PRN  sodium chloride 3%  Inhalation 4 milliLiter(s) Inhalation every 8 hours      Objective:  Vital Signs Last 24 Hrs  T(C): 36.6 (13 Sep 2018 16:00), Max: 37.1 (13 Sep 2018 04:00)  T(F): 97.9 (13 Sep 2018 16:00), Max: 98.7 (13 Sep 2018 04:00)  HR: 67 (13 Sep 2018 17:00) (60 - 79)  BP: 154/63 (13 Sep 2018 17:00) (125/69 - 163/53)  BP(mean): 83 (13 Sep 2018 17:00) (69 - 98)  RR: 19 (13 Sep 2018 17:00) (18 - 28)  SpO2: 100% (13 Sep 2018 17:00) (93% - 100%)    PHYSICAL EXAM:  Constitutional:NAD  Eyes:ELE, EOMI  Ear/Nose/Throat: no thrush, mucositis.  Moist mucous membranes	  Neck:no JVD, no lymphadenopathy, supple  Respiratory: CTA lanette  Cardiovascular: S1S2 RRR, no murmurs  Gastrointestinal:soft, nontender,  nondistended (+) BS  Extremities:no e/e/c  Skin:  no rashes, open wounds or ulcerations        LABS:                        11.5   9.99  )-----------( 443      ( 13 Sep 2018 02:25 )             34.9     09-13    139  |  101  |  18  ----------------------------<  136<H>  4.0   |  29  |  0.39<L>    Ca    8.9      13 Sep 2018 02:25  Phos  3.4     09-12  Mg     2.0     09-12    TPro  5.8<L>  /  Alb  2.6<L>  /  TBili  0.4  /  DBili  x   /  AST  29  /  ALT  28  /  AlkPhos  123<H>  09-13                            MICROBIOLOGY:    Culture - Blood (09.04.18 @ 11:42)    Culture - Blood:   NO ORGANISMS ISOLATED    Specimen Source: BLOOD VENOUS          RADIOLOGY & ADDITIONAL STUDIES:    < from: Xray Chest 1 View- PORTABLE-Urgent (09.13.18 @ 14:54) >    IMPRESSION:  Right pleural pigtail catheter has been removed.    Question small right apical pneumothorax with the pleural edge below the   posterior right third rib versus overlying linear artifact. Suggest   repeat image.    Continued right basilar opacity which could be due to postsurgical   change, atelectasis, and/or pneumonia in the appropriate clinical   context. Possible trace right pleural effusion again seen.    < end of copied text >

## 2018-09-13 NOTE — PROGRESS NOTE ADULT - SUBJECTIVE AND OBJECTIVE BOX
LIAM VELOZ                     [id number]    LIAM VELOZ          [id number]      HPI  83 y.o. female with hx of COPD, c/o SOB, dyspnea on exertion, fatigue, weight loss of 15 lbs in last 6 months, reports occasional nonproductive cough, s/p CXR, followed by CT scan, reports multiple right lung nodules noted admitted  for lung resection.    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)                   Bronchoscopy 8/30, 08/31, 09/01, 9/11/18                    PEG 9/05/18    Issues:   Aspiration Pneumonia  Atelectasis/  right pleural effusion  Shortness of breath / Bronchospasm / COPD exacerbation  postop SVT / a-FIB with pauses              Postop pain              HTN              Dysphagia              Malnutrition              hypophosphatemia    PAST MEDICAL & SURGICAL HISTORY:  Lung nodules  COPD (Chronic Obstructive Pulmonary Disease): diagnose in ~2013  Colon Polyps  History of Osteoporosis  History of Cataract Surgery: left eye  Hip Replacement: bilateral            VITAL SIGNS:  Vital Signs Last 24 Hrs  T(C): 37.1 (13 Sep 2018 04:00), Max: 37.1 (13 Sep 2018 04:00)  T(F): 98.7 (13 Sep 2018 04:00), Max: 98.7 (13 Sep 2018 04:00)  HR: 61 (13 Sep 2018 06:00) (61 - 100)  BP: 133/49 (13 Sep 2018 06:00) (115/51 - 163/53)  BP(mean): 69 (13 Sep 2018 06:00) (67 - 95)  RR: 22 (13 Sep 2018 06:00) (19 - 29)  SpO2: 96% (13 Sep 2018 06:00) (91% - 100%)    I/Os:   I&O's Detail    11 Sep 2018 07:01  -  12 Sep 2018 07:00  --------------------------------------------------------  IN:    Enteral Tube Flush: 120 mL    IV PiggyBack: 200 mL    octreotide  Infusion: 288 mL    Vivonex RTH: 895 mL  Total IN: 1503 mL    OUT:    Chest Tube: 600 mL    Indwelling Catheter - Urethral: 1895 mL  Total OUT: 2495 mL    Total NET: -992 mL      12 Sep 2018 07:01  -  13 Sep 2018 06:21  --------------------------------------------------------  IN:    Enteral Tube Flush: 60 mL    IV PiggyBack: 50 mL    lactated ringers.: 630 mL    octreotide  Infusion: 120 mL    Vivonex RTH: 1265 mL  Total IN: 2125 mL    OUT:    Chest Tube: 120 mL    Indwelling Catheter - Urethral: 1185 mL  Total OUT: 1305 mL    Total NET: 820 mL          CAPILLARY BLOOD GLUCOSE          =======================MEDICATIONS===================  MEDICATIONS  (STANDING):  apixaban 2.5 milliGRAM(s) Oral every 12 hours  dornase selina Solution 2.5 milliGRAM(s) Inhalation daily  famotidine    Tablet 20 milliGRAM(s) Oral daily  fluconAZOLE IVPB      fluconAZOLE IVPB 200 milliGRAM(s) IV Intermittent every 24 hours  ipratropium    for Nebulization 500 MICROGram(s) Nebulizer every 6 hours  lactated ringers. 1000 milliLiter(s) (30 mL/Hr) IV Continuous <Continuous>  levalbuterol Inhalation 0.63 milliGRAM(s) Inhalation every 6 hours  meropenem  IVPB 1000 milliGRAM(s) IV Intermittent every 8 hours  metoprolol tartrate 25 milliGRAM(s) Oral every 8 hours  sodium chloride 3%  Inhalation 4 milliLiter(s) Inhalation every 8 hours    MEDICATIONS  (PRN):  acetaminophen  IVPB .. 1000 milliGRAM(s) IV Intermittent once PRN Temp greater or equal to 38C (100.4F), Moderate Pain (4 - 6)  melatonin 3 milliGRAM(s) Oral at bedtime PRN Sleep  oxyCODONE    Solution 5 milliGRAM(s) Oral every 4 hours PRN Moderate Pain (4 - 6)        PHYSICAL EXAM============================  General:            in chair, Comfortable, Awake, alert, not in any distress  Neuro:              Moving all extremities to commands. No focal deficits	  HEENT:                           ELE  Respiratory:	 Equal breath sounds B/L                           No rales, few  rhonchi, no wheezing. Effort even and unlabored.                           Right chest surgical site - clean, right chest tube site - clean  CV:		Regular rate and rhythm. Normal S1/S2.   Abdomen:         Soft,  nontender, not-distended. Bowel sounds present ; PEG site - clean  Skin:		No rash.  Extremities:	Warm, no cyanosis or edema.  Palpable pulses  ============================LABS=========================                        11.5   9.99  )-----------( 443      ( 13 Sep 2018 02:25 )             34.9     09-13    139  |  101  |  18  ----------------------------<  136<H>  4.0   |  29  |  0.39<L>    Ca    8.9      13 Sep 2018 02:25  Phos  3.4     09-12  Mg     2.0     09-12    TPro  5.8<L>  /  Alb  2.6<L>  /  TBili  0.4  /  DBili  x   /  AST  29  /  ALT  28  /  AlkPhos  123<H>  09-13    LIVER FUNCTIONS - ( 13 Sep 2018 02:25 )  Alb: 2.6 g/dL / Pro: 5.8 g/dL / ALK PHOS: 123 u/L / ALT: 28 u/L / AST: 29 u/L / GGT: x                   ============================IMAGING STUDIES=========================  < from: Xray Chest 1 View- PORTABLE-Routine (09.12.18 @ 06:37) >  Right-sided pigtail catheter unchanged. Increasing opacity at the right   lung base likely atelectasis. The left lung and right upper lobe are   clear. The heart is not enlarged. Right apical pneumothorax is likely   present although the visceral lineis not clearly seen due to overlying   paraphernalia.    83 yFemale s/p RVATS /   Right upper lobectomy   08/27/2018, experiencing  pain with deep breathing. 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)                   Bronchoscopy 8/30, 08/31, 09/01, 9/11/18                    PEG 9/05/18  Issues:   Aspiration Pneumonia/+Serratia marcescens  Atelectasis/ right pleural effusion  Shortness of breath / Bronchospasm / COPD exacerbation  postop SVT / a-FIB with pauses              Postop pain              HTN              Dysphagia              Malnutrition              hypophosphatemia- supplemented    ====================== NEUROLOGY=======================  Pain control with PRN  Tylenol IV  Preferably no narcotics    ==================== RESPIRATORY========================  Pt is on   1  L nasal canula during the night ; room air during the day  Comfortable, no evidence of distress.  Using incentive spirometry   Monitor chest tube output  Chest tube to suction 	  Continue bronchodilators, pulmonary toilet  dornase selina Solution 2.5 milliGRAM(s) Inhalation daily  ipratropium    for Nebulization 500 MICROGram(s) Nebulizer every 6 hours  levalbuterol Inhalation 0.63 milliGRAM(s) Inhalation every 6 hours  Head of bed elevation to 30-40 degrees  OOB, ambulation    ====================CARDIOVASCULAR=====================  Continue hemodynamic monitoring/ telemetry  Continue cardiovascular / antihypertensive medications  Postop SVT / A-fb: on  Lopressor 25mg q8hrs via PEG, increase as tolerated &  no more pauses.  Started Eliquis for anticoagulation 5 days ago as per EP recs ( possibly will switch to Xarelto for once a day dosing )  HTN:  Increase lopressor as tolerated    ===================== RENAL ============================  Monitor I/Os, BUN/ Cr  and electrolytes   Keep Balderrama for UO monitoring  Balderrama may need to stay in for extended time in view of urinary  retention requiring multiple catheter reinsertion    ==================== GASTROINTESTINAL===================	  NPO  PEG feeds with Vivonex at goal 55ml / hr  Continue GI prophylaxis with Pepcid   Zofran / Reglan for nausea - PRN	  ? R  Chylothorax   - On Vivonex , D/C MCT oil and   Octreotide drip    =======================    ENDOCRIN  =====================  Glycemic monitoring  F/S with coverage  ===================HEMATOLOGIC/ONCOLOGIC =============  No signs of active bleeding.   Follow CBC, coags   DVT prophylaxis with SCD and AC with Eliquis fro SVT/Afib     ========================INFECTIOUS DISEASE===============  No signs of new infection. Monitor for fever / leukocytosis.  All surgical incision / chest tube  sites look clean  ID to follow up      Pertinent clinical, laboratory, radiographic, hemodynamic, echocardiographic, respiratory data, microbiologic data and chart were reviewed and analyzed frequently throughout the course of the day and night. GI and DVT prophylaxis, glycemic control, head of bed elevation and skin care issues were addressed.  Patient seen, examined and plan discussed with CT Surgery / CTICU team during rounds.  Pt remains critically ill in imminent risk of  deterioration and requires very careful cardio- pulmonary monitoring and support.    Pt's status discussed with family at bedside, updated status      Santiago Garcia DO FACEP

## 2018-09-13 NOTE — CONSULT NOTE ADULT - ATTENDING COMMENTS
agree with above note by HARDIK Burdick.  Pt developed SVT maria t and postop responsive to IV CCB  Pt now in NSR off all AVN blockers  ECHO to rule out structural heart disease  Can defer AVN blockers at this time , resume if SVT recurs  care per surgery  DVT ppx
Urinary retention likely due to her deconditioned state and ongoing multiple medical issues. I believe with more time as she regains her strength she will go back to baseline urinary functioning, she just needs more time. She has already failed void trials, would recommend 1-2 weeks of soriano and then void trial as an outpatient. Can follow up with Brian Gustafson of urology at

## 2018-09-13 NOTE — PROGRESS NOTE ADULT - ASSESSMENT
83 y.o. female with hx of COPD, no significant cardiac history presenting with intraop- postop SVT s/p Right upper lobectomy.  Pt has been c/o fatigue, weight loss of 15 lbs in last 6 months, and reports occasional nonproductive cough.  CXR and subsequent CT scan, reports multiple right lung nodules.  Pt admitted  for lung resection.  Pt underwent lobectomy of right lung  with video-assisted thoracoscopic surgery  on 8/27/2018 and flexible bronchoscopy with bronchopulmonary lavage.  She has a right chest tube in place.      Post op course complicated by SVT and A.fib with pauses and pneumothorax.         Pt currently without fever.  WBC 14.1 on 8/28 --> 10.5.  Pt was given a dose of rocephin on 8/30 for possible pneumonia.  During bedside bronchoscopy pt noted to have copious murky secretions concerning for infection and oropharyngeal candidiases.  ID consult called for further antibiotic managment.     Problem/Plan - 1:    ·	Pna    - Pt s/p RUL lobectomy, s/p bronch/lavage, chest tube to suction.  Noted to have copious purulent secretions and oropharyngeal candidiasis.      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  Repeat bronchoscopy shown to have improved secretions.  Pigtail removed on 9/13    No new ID recs at this time.  Recommend d/c abx and observe off.        Margareth Hodge  [phone]

## 2018-09-13 NOTE — CONSULT NOTE ADULT - SUBJECTIVE AND OBJECTIVE BOX
HPI:  83 y.o. female with hx of COPD, c/o SOB, dyspnea on exertion, fatigue, weight loss of 15 lbs in last 6 months, reports occasional nonproductive cough, s/p CXR, followed by CT scan, reports multiple right lung nodules noted admitted  for lung resection.  She underwent  RVATS /   Right upper lobectomy   08/27/2018 and Bronchoscopy 8/30, 08/31, 09/01, 9/11/18.  Pt failed TOV x2 during hospitalization and currently has a soriano in place.  Pt denies any previous Urologic issues    PAST MEDICAL & SURGICAL HISTORY:  Lung nodules  COPD (Chronic Obstructive Pulmonary Disease): diagnose in ~2013  Colon Polyps  History of Osteoporosis  History of Cataract Surgery: left eye  Hip Replacement: bilateral      MEDICATIONS  (STANDING):  apixaban 2.5 milliGRAM(s) Oral every 12 hours  dornase selina Solution 2.5 milliGRAM(s) Inhalation daily  famotidine    Tablet 20 milliGRAM(s) Oral daily  ipratropium    for Nebulization 500 MICROGram(s) Nebulizer every 6 hours  lactated ringers. 1000 milliLiter(s) (30 mL/Hr) IV Continuous <Continuous>  levalbuterol Inhalation 0.63 milliGRAM(s) Inhalation every 6 hours  metoprolol tartrate 25 milliGRAM(s) Oral every 8 hours  sodium chloride 3%  Inhalation 4 milliLiter(s) Inhalation every 8 hours      FAMILY HISTORY:  Family history of liver cancer (Sibling)  Family history of lung cancer (Father): mesothelioma of lung      Allergies    No Known Allergies        SOCIAL HISTORY:    · Marital Status	  · Occupation	retired  · Lives With	spouse     Substance Use History:  · Substance Use	caffeine  · Caffeine Type	decaf coffee 1 cup daily; denies caffeine intake     Alcohol Use History:  · Have you ever consumed alcohol	yes...  · Alcohol Type	wine  · Alcohol Frequency	daily  · Alcohol Amount	1-2 drinks     Tobacco Usage:  · Tobacco Usage: Former smoker  · Tobacco Type: cigarettes  quit ~2008  · Number of Packs per Day: 1  · Number of yrs: 45  · Pack yrs: 46      REVIEW OF SYSTEMS: Otherwise negative as stated in HPI      Vital Signs Last 24 Hrs  T(C): 36.6 (13 Sep 2018 16:00), Max: 37.1 (13 Sep 2018 04:00)  T(F): 97.9 (13 Sep 2018 16:00), Max: 98.7 (13 Sep 2018 04:00)  HR: 65 (13 Sep 2018 16:00) (60 - 79)  BP: 143/59 (13 Sep 2018 16:00) (115/51 - 163/53)  BP(mean): 79 (13 Sep 2018 16:00) (67 - 98)  RR: 23 (13 Sep 2018 16:00) (18 - 29)  SpO2: 96% (13 Sep 2018 16:00) (93% - 100%)    PHYSICAL EXAM:    General:	[x ] Awake and Alert in no acute distress    Respiratory and Thorax: [x  ] no resp distress   	  Cardiovascular: [x ] Reg Rate    Gastrointestinal: [x ]soft  [x ] non tender, CVAT [ ]Y  [x ]N     Genitourinary: Normal external  female genitalia.  No cystocele                        	  Musculoskeletal / Extremities:  Edema [ ]Y [x ]N  	          LABS:                        11.5   9.99  )-----------( 443      ( 13 Sep 2018 02:25 )             34.9     09-13    139  |  101  |  18  ----------------------------<  136<H>  4.0   |  29  |  0.39<L>    Ca    8.9      13 Sep 2018 02:25  Phos  3.4     09-12  Mg     2.0     09-12    TPro  5.8<L>  /  Alb  2.6<L>  /  TBili  0.4  /  DBili  x   /  AST  29  /  ALT  28  /  AlkPhos  123<H>  09-13

## 2018-09-13 NOTE — CONSULT NOTE ADULT - PROBLEM SELECTOR RECOMMENDATION 9
Obtin UA, Urine Cx  Pt's urinary retention is likely due to her deconditioned state.  So as a result do not perform another TOV until closer to her baseline functioning status.  Pt can go to Rehab with catheter and follow up with Dr. Evan Torres [place] [facility]  [phone]  for a trial of void in office Obtin UA, Urine Cx  Pt's urinary retention is likely due to her deconditioned state, repeated sedation, analgesia.  Do not expect successful TOV until closer to her baseline functioning status and off narcotics/sedatives.  Pt can go to Rehab with catheter and follow up with [facility]  [phone] request first available  for a trial of void in office in 7-10 days

## 2018-09-14 LAB
ALBUMIN SERPL ELPH-MCNC: 2.4 G/DL — LOW (ref 3.3–5)
ALP SERPL-CCNC: 131 U/L — HIGH (ref 40–120)
ALT FLD-CCNC: 85 U/L — HIGH (ref 4–33)
APPEARANCE UR: SIGNIFICANT CHANGE UP
AST SERPL-CCNC: 91 U/L — HIGH (ref 4–32)
BACTERIA # UR AUTO: SIGNIFICANT CHANGE UP
BILIRUB SERPL-MCNC: 0.5 MG/DL — SIGNIFICANT CHANGE UP (ref 0.2–1.2)
BILIRUB UR-MCNC: NEGATIVE — SIGNIFICANT CHANGE UP
BLOOD UR QL VISUAL: SIGNIFICANT CHANGE UP
BUN SERPL-MCNC: 21 MG/DL — SIGNIFICANT CHANGE UP (ref 7–23)
CALCIUM SERPL-MCNC: 8.6 MG/DL — SIGNIFICANT CHANGE UP (ref 8.4–10.5)
CHLORIDE SERPL-SCNC: 99 MMOL/L — SIGNIFICANT CHANGE UP (ref 98–107)
CO2 SERPL-SCNC: 30 MMOL/L — SIGNIFICANT CHANGE UP (ref 22–31)
COLOR SPEC: YELLOW — SIGNIFICANT CHANGE UP
CREAT SERPL-MCNC: 0.37 MG/DL — LOW (ref 0.5–1.3)
GLUCOSE SERPL-MCNC: 142 MG/DL — HIGH (ref 70–99)
GLUCOSE UR-MCNC: NEGATIVE — SIGNIFICANT CHANGE UP
HCT VFR BLD CALC: 34.7 % — SIGNIFICANT CHANGE UP (ref 34.5–45)
HGB BLD-MCNC: 11.3 G/DL — LOW (ref 11.5–15.5)
KETONES UR-MCNC: NEGATIVE — SIGNIFICANT CHANGE UP
LEUKOCYTE ESTERASE UR-ACNC: NEGATIVE — SIGNIFICANT CHANGE UP
MAGNESIUM SERPL-MCNC: 2 MG/DL — SIGNIFICANT CHANGE UP (ref 1.6–2.6)
MCHC RBC-ENTMCNC: 30.1 PG — SIGNIFICANT CHANGE UP (ref 27–34)
MCHC RBC-ENTMCNC: 32.6 % — SIGNIFICANT CHANGE UP (ref 32–36)
MCV RBC AUTO: 92.3 FL — SIGNIFICANT CHANGE UP (ref 80–100)
NITRITE UR-MCNC: NEGATIVE — SIGNIFICANT CHANGE UP
NRBC # FLD: 0 — SIGNIFICANT CHANGE UP
PH UR: 6.5 — SIGNIFICANT CHANGE UP (ref 5–8)
PHOSPHATE SERPL-MCNC: 2.1 MG/DL — LOW (ref 2.5–4.5)
PLATELET # BLD AUTO: 411 K/UL — HIGH (ref 150–400)
PMV BLD: 9.3 FL — SIGNIFICANT CHANGE UP (ref 7–13)
POTASSIUM SERPL-MCNC: 3.8 MMOL/L — SIGNIFICANT CHANGE UP (ref 3.5–5.3)
POTASSIUM SERPL-SCNC: 3.8 MMOL/L — SIGNIFICANT CHANGE UP (ref 3.5–5.3)
PROT SERPL-MCNC: 5.5 G/DL — LOW (ref 6–8.3)
PROT UR-MCNC: 30 — SIGNIFICANT CHANGE UP
RBC # BLD: 3.76 M/UL — LOW (ref 3.8–5.2)
RBC # FLD: 14 % — SIGNIFICANT CHANGE UP (ref 10.3–14.5)
RBC CASTS # UR COMP ASSIST: SIGNIFICANT CHANGE UP (ref 0–?)
SODIUM SERPL-SCNC: 139 MMOL/L — SIGNIFICANT CHANGE UP (ref 135–145)
SP GR SPEC: 1.02 — SIGNIFICANT CHANGE UP (ref 1–1.04)
SQUAMOUS # UR AUTO: SIGNIFICANT CHANGE UP
UROBILINOGEN FLD QL: NORMAL — SIGNIFICANT CHANGE UP
WBC # BLD: 8.95 K/UL — SIGNIFICANT CHANGE UP (ref 3.8–10.5)
WBC # FLD AUTO: 8.95 K/UL — SIGNIFICANT CHANGE UP (ref 3.8–10.5)
WBC UR QL: SIGNIFICANT CHANGE UP (ref 0–?)

## 2018-09-14 PROCEDURE — 99233 SBSQ HOSP IP/OBS HIGH 50: CPT

## 2018-09-14 PROCEDURE — 71045 X-RAY EXAM CHEST 1 VIEW: CPT | Mod: 26

## 2018-09-14 RX ORDER — SODIUM,POTASSIUM PHOSPHATES 278-250MG
1 POWDER IN PACKET (EA) ORAL THREE TIMES A DAY
Qty: 0 | Refills: 0 | Status: COMPLETED | OUTPATIENT
Start: 2018-09-14 | End: 2018-09-14

## 2018-09-14 RX ADMIN — Medication 25 MILLIGRAM(S): at 05:50

## 2018-09-14 RX ADMIN — APIXABAN 2.5 MILLIGRAM(S): 2.5 TABLET, FILM COATED ORAL at 17:47

## 2018-09-14 RX ADMIN — LEVALBUTEROL 0.63 MILLIGRAM(S): 1.25 SOLUTION, CONCENTRATE RESPIRATORY (INHALATION) at 10:43

## 2018-09-14 RX ADMIN — Medication 1 PACKET(S): at 22:04

## 2018-09-14 RX ADMIN — LEVALBUTEROL 0.63 MILLIGRAM(S): 1.25 SOLUTION, CONCENTRATE RESPIRATORY (INHALATION) at 22:00

## 2018-09-14 RX ADMIN — Medication 1000 MILLIGRAM(S): at 21:15

## 2018-09-14 RX ADMIN — DORNASE ALFA 2.5 MILLIGRAM(S): 1 SOLUTION RESPIRATORY (INHALATION) at 11:34

## 2018-09-14 RX ADMIN — SODIUM CHLORIDE 4 MILLILITER(S): 9 INJECTION INTRAMUSCULAR; INTRAVENOUS; SUBCUTANEOUS at 16:48

## 2018-09-14 RX ADMIN — FAMOTIDINE 20 MILLIGRAM(S): 10 INJECTION INTRAVENOUS at 13:13

## 2018-09-14 RX ADMIN — Medication 500 MICROGRAM(S): at 16:58

## 2018-09-14 RX ADMIN — Medication 25 MILLIGRAM(S): at 13:13

## 2018-09-14 RX ADMIN — LEVALBUTEROL 0.63 MILLIGRAM(S): 1.25 SOLUTION, CONCENTRATE RESPIRATORY (INHALATION) at 04:12

## 2018-09-14 RX ADMIN — Medication 400 MILLIGRAM(S): at 20:45

## 2018-09-14 RX ADMIN — Medication 1 PACKET(S): at 13:13

## 2018-09-14 RX ADMIN — LEVALBUTEROL 0.63 MILLIGRAM(S): 1.25 SOLUTION, CONCENTRATE RESPIRATORY (INHALATION) at 16:58

## 2018-09-14 RX ADMIN — Medication 500 MICROGRAM(S): at 10:43

## 2018-09-14 RX ADMIN — APIXABAN 2.5 MILLIGRAM(S): 2.5 TABLET, FILM COATED ORAL at 05:50

## 2018-09-14 RX ADMIN — SODIUM CHLORIDE 4 MILLILITER(S): 9 INJECTION INTRAMUSCULAR; INTRAVENOUS; SUBCUTANEOUS at 10:32

## 2018-09-14 RX ADMIN — Medication 1 PACKET(S): at 05:51

## 2018-09-14 RX ADMIN — Medication 500 MICROGRAM(S): at 22:00

## 2018-09-14 RX ADMIN — Medication 3 MILLIGRAM(S): at 22:04

## 2018-09-14 RX ADMIN — Medication 25 MILLIGRAM(S): at 22:04

## 2018-09-14 RX ADMIN — Medication 500 MICROGRAM(S): at 04:12

## 2018-09-14 NOTE — PROGRESS NOTE ADULT - SUBJECTIVE AND OBJECTIVE BOX
LIAM VELOZ          MRN-[id number]    HPI:      Procedure:  POD # :     Issues:        Interval/Overnight Events/ ROS  Pt remained hemodynamically stable overnight, not on any pressors or inotropes. OOB to chair, breathing comfortably with minimal pain. Ambulated several times . Denies pain, no SOB, no palpitations, no nausea/ no vomiting, no dizziness  A-line and soriano d/trina         PAST MEDICAL & SURGICAL HISTORY:  Lung nodules  COPD (Chronic Obstructive Pulmonary Disease): diagnose in ~  Colon Polyps  History of Osteoporosis  History of Cataract Surgery: left eye  Hip Replacement: bilateral    Allergies    No Known Allergies    Intolerances            ***VITAL SIGNS:  Vital Signs Last 24 Hrs  T(C): 37 (14 Sep 2018 00:00), Max: 37 (13 Sep 2018 20:00)  T(F): 98.6 (14 Sep 2018 00:00), Max: 98.6 (13 Sep 2018 20:00)  HR: 69 (14 Sep 2018 04:12) (60 - 77)  BP: 141/50 (14 Sep 2018 03:00) (125/69 - 158/50)  BP(mean): 73 (14 Sep 2018 03:00) (69 - 98)  RR: 24 (14 Sep 2018 03:00) (18 - 25)  SpO2: 98% (14 Sep 2018 04:12) (93% - 100%)    I/Os:   I&O's Detail    12 Sep 2018 07:01  -  13 Sep 2018 07:00  --------------------------------------------------------  IN:    Enteral Tube Flush: 60 mL    IV PiggyBack: 50 mL    lactated ringers.: 660 mL    octreotide  Infusion: 120 mL    Vivonex RTH: 1320 mL  Total IN: 2210 mL    OUT:    Chest Tube: 130 mL    Indwelling Catheter - Urethral: 1235 mL  Total OUT: 1365 mL    Total NET: 845 mL      13 Sep 2018 07:01  -  14 Sep 2018 04:22  --------------------------------------------------------  IN:    Enteral Tube Flush: 200 mL    IV PiggyBack: 100 mL    lactated ringers.: 90 mL    Vivonex RTH: 1155 mL  Total IN: 1545 mL    OUT:    Chest Tube: 140 mL    Indwelling Catheter - Urethral: 1130 mL  Total OUT: 1270 mL    Total NET: 275 mL          CAPILLARY BLOOD GLUCOSE          =======================  MEDICATIONS  ===================  MEDICATIONS  (STANDING):  apixaban 2.5 milliGRAM(s) Oral every 12 hours  dornase selina Solution 2.5 milliGRAM(s) Inhalation daily  famotidine    Tablet 20 milliGRAM(s) Oral daily  ipratropium    for Nebulization 500 MICROGram(s) Nebulizer every 6 hours  lactated ringers. 1000 milliLiter(s) (30 mL/Hr) IV Continuous <Continuous>  levalbuterol Inhalation 0.63 milliGRAM(s) Inhalation every 6 hours  metoprolol tartrate 25 milliGRAM(s) Oral every 8 hours  potassium phosphate / sodium phosphate powder 1 Packet(s) Oral three times a day  sodium chloride 3%  Inhalation 4 milliLiter(s) Inhalation every 8 hours    MEDICATIONS  (PRN):  acetaminophen  IVPB .. 1000 milliGRAM(s) IV Intermittent once PRN Temp greater or equal to 38C (100.4F), Moderate Pain (4 - 6)  melatonin 3 milliGRAM(s) Oral at bedtime PRN Sleep  oxyCODONE    Solution 5 milliGRAM(s) Oral every 4 hours PRN Moderate Pain (4 - 6)      ======================VENTILATOR SETTINGS  ==============      =================== PATIENT CARE ACCESS DEVICES ==========  Peripheral IV  Central Venous Line	R	L	IJ	Fem	SC			Placed:   Arterial Line	R	L	PT	DP	Fem	Rad	Ax	Placed:   Midline:				  Urinary Catheter, Date Placed:   Necessity of urinary, arterial, and venous catheters discussed    ======================= PHYSICAL EXAM===================  General:                         Comfortable, Awake, alert, not in any distress  Neuro:                            Moving all extremities to commands. No focal deficits	  HEENT:                           ELE/ ETT/ NGT/ trach  Respiratory:	Lungs clear on auscultation bilaterally with good aeration.                                           No rales, rhonchi, no wheezing. Effort even and unlabored.  CV:		Regular rate and rhythm. Normal S1/S2. No murmurs  Abdomen:	                     Soft,  nontender, not-distended. Bowel sounds present / absent.   Skin:		No rash.  Extremities:	Warm, no cyanosis or edema.  Palpable pulses    ============================ LABS =======================                        11.3   8.95  )-----------( 411      ( 14 Sep 2018 03:30 )             34.7         139  |  99  |  21  ----------------------------<  142<H>  3.8   |  30  |  0.37<L>    Ca    8.6      14 Sep 2018 03:30  Phos  2.1       Mg     2.0         TPro  5.5<L>  /  Alb  2.4<L>  /  TBili  0.5  /  DBili  x   /  AST  91<H>  /  ALT  85<H>  /  AlkPhos  131<H>      LIVER FUNCTIONS - ( 14 Sep 2018 03:30 )  Alb: 2.4 g/dL / Pro: 5.5 g/dL / ALK PHOS: 131 u/L / ALT: 85 u/L / AST: 91 u/L / GGT: x               Urinalysis Basic - ( 14 Sep 2018 03:40 )    Color: YELLOW / Appearance: HAZY / S.020 / pH: 6.5  Gluc: NEGATIVE / Ketone: NEGATIVE  / Bili: NEGATIVE / Urobili: NORMAL   Blood: LARGE / Protein: 30 / Nitrite: NEGATIVE   Leuk Esterase: NEGATIVE / RBC: 5-10 / WBC 3-5   Sq Epi: OCC / Non Sq Epi: x / Bacteria: FEW      BLOOD VENOUS  18 --  --  --      BRONCHIAL LAVAGE  18 --  --  Serratia marcescens      PLEURAL FLUID  18 --  --    NOS^No Organisms Seen  WBC^White Blood Cells  QNTY CELLS IN GRAM STAIN: FEW (2+)      BRONCHIAL LAVAGE  18 --  --  Serratia marcescens          ===================== IMAGING STUDIES ===================  Radiology personally reviewed.    ====================ASSESSMENT AND PLAN ================      ====================== NEUROLOGY=======================  Pain control with PCA / PCEA / Tylenol IV / Toradol / Percocet  Pt is on Precedex for agitation  Pt is sedated with Propofol / Fentanyl    ==================== RESPIRATORY========================  Pt is on            L nasal canula / Face tent____% FiO2  Comfortable, no evidence of distress.  Using incentive spirometry & doing                ml  Monitor chest tube output  Chest tube to suction / water seal	    Mechanical Ventilation:    Mechanical ventilator status assessed & settings reviewed  Continue bronchodilators, pulmonary toilet  Head of bed elevation to 30-40 degrees    ====================CARDIOVASCULAR=====================  Continue hemodynamic monitoring/ telemetry  Not on any pressors  Continue cardiovascular / antihypertensive medications    ===================== RENAL ============================  Continue LR 30CC/hr      D/C IVF  Monitor I/Os, BUN/ Cr  and electrolytes  D/C Soriano      Keep Soriano for UO monitoring  BPH: Continue Flomax/ Finasteride      ==================== GASTROINTESTINAL===================  On regular diet, tolerating well  Continue GI prophylaxis with Pepcid / Protonix  Continue Zofran / Reglan for nausea - PRN	  NPO    =======================    ENDOCRIN  =====================  Glycemic monitoring  F/S with coverage  ===================HEMATOLOGIC/ONCOLOGIC =============  Monitor chest tube output. No signs of active bleeding.   Follow CBC, coags  in AM  DVT prophylaxis with SCD, sc Heparin    ========================INFECTIOUS DISEASE===============  No signs of infection. Monitor for fever / leukocytosis.  All surgical incision / chest tube  sites look clean  D/C Soriano      Pertinent clinical, laboratory, radiographic, hemodynamic, echocardiographic, respiratory data, microbiologic data and chart were reviewed and analyzed frequently throughout the course of the day and night. GI and DVT prophylaxis, glycemic control, head of bed elevation and skin care issues were addressed.  Patient seen, examined and plan discussed with CT Surgery / CTICU team during rounds.  Pt remains critically ill in imminent risk of  deterioration and requires very careful cardio- pulmonary monitoring and support.    I have spent               minutes of critical care time with this pt between            am/pm    and               am/ pm         minutes spent on total encounter; more than 50% of the visit was spent counseling and/or coordinating care by the attending physician.        ALVARO Snowden MD LIAM VELOZ          [id number]  HPI  83 y.o. female with hx of COPD, c/o SOB, dyspnea on exertion, fatigue, weight loss of 15 lbs in last 6 months, reports occasional nonproductive cough, s/p CXR, followed by CT scan, reports multiple right lung nodules noted admitted  for lung resection.    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 Appears overall stronger.  OOB to chair, breathing comfortably with minimal pain. Ambulated several times . Denies SOB, no palpitations, no nausea/ no vomiting, no dizziness   consulted yesterday - recommended Balderrama to remain in place for next few days before next trial of voiding. Right pigtail d/trina yesterday  ABX  2 week course completed 18. Octreotide and TG  d/trina      PAST MEDICAL & SURGICAL HISTORY:  Lung nodules  COPD (Chronic Obstructive Pulmonary Disease): diagnosed in ~  Colon Polyps  History of Osteoporosis  History of Cataract Surgery: left eye  Hip Replacement: bilateral               Home Medications:  Advair Diskus 250 mcg-50 mcg inhalation powder: 1 puff(s) inhaled 2 times a day (27 Aug 2018 12:42)  Spiriva 18 mcg inhalation capsule: 1 cap(s) inhaled once a day (27 Aug 2018 12:42)    Allergies  No Known Allergies        ***VITAL SIGNS:  Vital Signs Last 24 Hrs  T(C): 37 (14 Sep 2018 00:00), Max: 37 (13 Sep 2018 20:00)  T(F): 98.6 (14 Sep 2018 00:00), Max: 98.6 (13 Sep 2018 20:00)  HR: 69 (14 Sep 2018 04:12) (60 - 77)  BP: 141/50 (14 Sep 2018 03:00) (125/69 - 158/50)  BP(mean): 73 (14 Sep 2018 03:00) (69 - 98)  RR: 24 (14 Sep 2018 03:00) (18 - 25)  SpO2: 98% (14 Sep 2018 04:12) (93% - 100%)    I/Os:   I&O's Detail    12 Sep 2018 07:01  -  13 Sep 2018 07:00  --------------------------------------------------------  IN:    Enteral Tube Flush: 60 mL    IV PiggyBack: 50 mL    lactated ringers.: 660 mL    octreotide  Infusion: 120 mL    Vivonex RTH: 1320 mL  Total IN: 2210 mL    OUT:    Chest Tube: 130 mL    Indwelling Catheter - Urethral: 1235 mL  Total OUT: 1365 mL    Total NET: 845 mL      13 Sep 2018 07:01  -  14 Sep 2018 04:22  --------------------------------------------------------  IN:    Enteral Tube Flush: 200 mL    IV PiggyBack: 100 mL    lactated ringers.: 90 mL    Vivonex RTH: 1155 mL  Total IN: 1545 mL    OUT:    Chest Tube: 140 mL    Indwelling Catheter - Urethral: 1130 mL  Total OUT: 1270 mL    Total NET: 275 mL      =======================  MEDICATIONS  ===================  MEDICATIONS  (STANDING):  apixaban 2.5 milliGRAM(s) Oral every 12 hours  dornase selina Solution 2.5 milliGRAM(s) Inhalation daily  famotidine    Tablet 20 milliGRAM(s) Oral daily  ipratropium    for Nebulization 500 MICROGram(s) Nebulizer every 6 hours  lactated ringers. 1000 milliLiter(s) (30 mL/Hr) IV Continuous <Continuous>  levalbuterol Inhalation 0.63 milliGRAM(s) Inhalation every 6 hours  metoprolol tartrate 25 milliGRAM(s) Oral every 8 hours  potassium phosphate / sodium phosphate powder 1 Packet(s) Oral three times a day  sodium chloride 3%  Inhalation 4 milliLiter(s) Inhalation every 8 hours    MEDICATIONS  (PRN):  acetaminophen  IVPB .. 1000 milliGRAM(s) IV Intermittent once PRN Temp greater or equal to 38C (100.4F), Moderate Pain (4 - 6)  melatonin 3 milliGRAM(s) Oral at bedtime PRN Sleep  oxyCODONE    Solution 5 milliGRAM(s) Oral every 4 hours PRN Moderate Pain (4 - 6)      =================== PATIENT CARE ACCESS DEVICES ==========  Peripheral IV (+)			  Urinary Catheter, Date Placed:9/10/18  Necessity of urinary  venous catheters discussed    ======================= PHYSICAL EXAM===================  General:            Appears stronger, Comfortable, Awake, alert, not in any distress  Neuro:              Moving all extremities to commands. No focal deficits	  HEENT:                           ELE  Respiratory:	 Equal breath sounds B/L                           No rales, few  rhonchi, no wheezing. Effort even and unlabored.                           Right chest surgical site  clean  CV:		Regular rate and rhythm. Normal S1/S2.   Abdomen:         Soft,  nontender, not-distended. Bowel sounds present ; PEG site - clean  Skin:		No rash.  Extremities:	Warm, no cyanosis or edema.  Palpable pulses     ============================ LABS =======================                        11.3   8.95  )-----------( 411      ( 14 Sep 2018 03:30 )             34.7     09-14    139  |  99  |  21  ----------------------------<  142<H>  3.8   |  30  |  0.37<L>    Ca    8.6      14 Sep 2018 03:30  Phos  2.1     09-14  Mg     2.0     09-14    TPro  5.5<L>  /  Alb  2.4<L>  /  TBili  0.5  /  DBili  x   /  AST  91<H>  /  ALT  85<H>  /  AlkPhos  131<H>      LIVER FUNCTIONS - ( 14 Sep 2018 03:30 )  Alb: 2.4 g/dL / Pro: 5.5 g/dL / ALK PHOS: 131 u/L / ALT: 85 u/L / AST: 91 u/L / GGT: x               Urinalysis Basic - ( 14 Sep 2018 03:40 )    Color: YELLOW / Appearance: HAZY / S.020 / pH: 6.5  Gluc: NEGATIVE / Ketone: NEGATIVE  / Bili: NEGATIVE / Urobili: NORMAL   Blood: LARGE / Protein: 30 / Nitrite: NEGATIVE   Leuk Esterase: NEGATIVE / RBC: 5-10 / WBC 3-5   Sq Epi: OCC / Non Sq Epi: x / Bacteria: FEW      BLOOD VENOUS  18 --  --  --      BRONCHIAL LAVAGE  18 --  --  Serratia marcescens      PLEURAL FLUID  18 --  --    NOS^No Organisms Seen  WBC^White Blood Cells  QNTY CELLS IN GRAM STAIN: FEW (2+)      BRONCHIAL LAVAGE  18 --  --  Serratia marcescens    ===================== IMAGING STUDIES ===================  Radiology personally reviewed.  < from: Xray Chest 1 View- PORTABLE-Urgent (18 @ 14:54) >    INTERPRETATION:  TIME OF EXAM: 2018 at 2:28 PM.    CLINICAL INFORMATION: Right pleural pigtail catheter removal.    COMPARISON:  2018 at 6:26AM.    TECHNIQUE:   AP Portable chest x-ray.    INTERPRETATION:     Heart size and the mediastinum cannot be accurately evaluated on this   projection. Thoracic aorta is calcified.  Right pleural pigtail catheter has been removed.  Question small right apical pneumothorax with pleural edge below the   posterior right third rib versus overlying linear artifact.  Continued right basilar opacity with possible trace right pleural   effusion. Left lung is clear. No left pleural effusion.      IMPRESSION:  Right pleural pigtail catheter has been removed.    Question small right apical pneumothorax with the pleural edge below the   posterior right third rib versus overlying linear artifact. Suggest   repeat image.    Continued right basilar opacity which could be due to postsurgical   change, atelectasis, and/or pneumonia in the appropriate clinical   context. Possible trace right pleural effusion again seen.      < end of copied text >    ====================ASSESSMENT AND PLAN ================  83 yFemale s/p RVATS /   Right upper lobectomy   2018, experiencing  pain with deep breathing. 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)                   Bronchoscopy , , , 18                    PEG 18  Issues:   Aspiration Pneumonia/+Serratia marcescens  Atelectasis/ right pleural effusion  Shortness of breath / Bronchospasm / COPD exacerbation  postop SVT / a-FIB with pauses              Postop pain              HTN              Dysphagia              Malnutrition              hypophosphatemia- supplemented              Urinary retention ( Balderrama reinserted 9/10/18)    ====================== NEUROLOGY=======================  Pain control with PRN  Tylenol IV  Preferably no narcotics    ==================== RESPIRATORY========================  Pt is on   1  L nasal canula during the night ; room air during the day  Comfortable, no evidence of distress.  Using incentive spirometry  	  Continue bronchodilators, pulmonary toilet  dornase selina Solution 2.5 milliGRAM(s) Inhalation daily  ipratropium    for Nebulization 500 MICROGram(s) Nebulizer every 6 hours  levalbuterol Inhalation 0.63 milliGRAM(s) Inhalation every 6 hours  Head of bed elevation to 30-40 degrees  OOB, ambulation    ====================CARDIOVASCULAR=====================  Continue hemodynamic monitoring/ telemetry  Continue cardiovascular / antihypertensive medications  Postop SVT / A-fb: on  Lopressor 25mg q8hrs via PEG, increase as tolerated &  no more pauses.  On Eliquis for anticoagulation as per EP recs ( possibly will switch to Xarelto for once a day dosing )  HTN:  Increase lopressor as tolerated    ===================== RENAL ============================  Monitor I/Os, BUN/ Cr  and electrolytes   Keep Balderrama for UO monitoring  Balderrama may need to stay in for extended time in view of urinary  retention requiring multiple catheter reinsertion (  on board)      ==================== GASTROINTESTINAL===================  NPO  PEG feeds with Vivonex at goal 55ml / hr  Continue GI prophylaxis with Pepcid   Zofran / Reglan for nausea - PRN	  ? R  Chylothorax   - On Vivonex    MCT oil and   Octreotide drip completed and d/trina yesterday    =======================    ENDOCRIN  =====================  Glycemic monitoring  F/S with coverage  ===================HEMATOLOGIC/ONCOLOGIC =============  No signs of active bleeding.   Follow CBC, coags   DVT prophylaxis with SCD and AC with Eliquis fro SVT/Afib     ========================INFECTIOUS DISEASE===============  No signs of new infection. Monitor for fever / leukocytosis.  All surgical incision / chest tube  sites look clean   Rt lower lobe Serratia pneumonia and oral candidasis : On Meropenem  / Diflucan for 2 weeks ( ABX completed on 18)      Pertinent clinical, laboratory, radiographic, hemodynamic, echocardiographic, respiratory data, microbiologic data and chart were reviewed and analyzed frequently throughout the course of the day and night. GI and DVT prophylaxis, glycemic control, head of bed elevation and skin care issues were addressed.  Patient seen, examined and plan discussed with CT Surgery / CTICU team during rounds.      I have spent   35   minutes of critical care time with this pt between   12   am    and     8  am         minutes spent on total encounter; more than 50% of the visit was spent counseling and/or coordinating care by the attending physician.  Pt's status discussed with family at bedside, updated status      ALVARO Snowden MD

## 2018-09-14 NOTE — CHART NOTE - NSCHARTNOTEFT_GEN_A_CORE
Ms. Hinton is an 84 yo F former smoker with COPD with recent REED and weight loss found to have RUL nodule concerning for malignancy now s/p RUL lobectomy, with subsequent development of recurrent SVT, found to be in afib/flutter post op.     Reviewed telemetry - Remains in Sinus rhythm 60's. No atrial arrhythmias detected.   No indication for device therapy at the present time.   Recommend Anticoagulation when cleared from surgical stand point given her history of PAF and CHADS VaSc score of 3 ( Age / gender).   On Eliquis Tolerating well.  Continue Metop 25 Q8, may increase if needed    Prakash Corona MD  Cardiology Fellow

## 2018-09-15 LAB
ALBUMIN SERPL ELPH-MCNC: 2.6 G/DL — LOW (ref 3.3–5)
ALP SERPL-CCNC: 170 U/L — HIGH (ref 40–120)
ALT FLD-CCNC: 151 U/L — HIGH (ref 4–33)
APTT BLD: 33.9 SEC — SIGNIFICANT CHANGE UP (ref 27.5–37.4)
AST SERPL-CCNC: 125 U/L — HIGH (ref 4–32)
BASOPHILS # BLD AUTO: 0.07 K/UL — SIGNIFICANT CHANGE UP (ref 0–0.2)
BASOPHILS NFR BLD AUTO: 0.9 % — SIGNIFICANT CHANGE UP (ref 0–2)
BILIRUB SERPL-MCNC: 0.4 MG/DL — SIGNIFICANT CHANGE UP (ref 0.2–1.2)
BUN SERPL-MCNC: 23 MG/DL — SIGNIFICANT CHANGE UP (ref 7–23)
CALCIUM SERPL-MCNC: 8.9 MG/DL — SIGNIFICANT CHANGE UP (ref 8.4–10.5)
CHLORIDE SERPL-SCNC: 101 MMOL/L — SIGNIFICANT CHANGE UP (ref 98–107)
CO2 SERPL-SCNC: 28 MMOL/L — SIGNIFICANT CHANGE UP (ref 22–31)
CREAT SERPL-MCNC: 0.4 MG/DL — LOW (ref 0.5–1.3)
EOSINOPHIL # BLD AUTO: 0.45 K/UL — SIGNIFICANT CHANGE UP (ref 0–0.5)
EOSINOPHIL NFR BLD AUTO: 6.1 % — HIGH (ref 0–6)
GLUCOSE SERPL-MCNC: 102 MG/DL — HIGH (ref 70–99)
HCT VFR BLD CALC: 37.3 % — SIGNIFICANT CHANGE UP (ref 34.5–45)
HCT VFR BLD CALC: 37.3 % — SIGNIFICANT CHANGE UP (ref 34.5–45)
HGB BLD-MCNC: 12.2 G/DL — SIGNIFICANT CHANGE UP (ref 11.5–15.5)
HGB BLD-MCNC: 12.2 G/DL — SIGNIFICANT CHANGE UP (ref 11.5–15.5)
IMM GRANULOCYTES # BLD AUTO: 0.05 # — SIGNIFICANT CHANGE UP
IMM GRANULOCYTES NFR BLD AUTO: 0.7 % — SIGNIFICANT CHANGE UP (ref 0–1.5)
INR BLD: 1.12 — SIGNIFICANT CHANGE UP (ref 0.88–1.17)
LYMPHOCYTES # BLD AUTO: 0.88 K/UL — LOW (ref 1–3.3)
LYMPHOCYTES # BLD AUTO: 11.8 % — LOW (ref 13–44)
MAGNESIUM SERPL-MCNC: 2.1 MG/DL — SIGNIFICANT CHANGE UP (ref 1.6–2.6)
MCHC RBC-ENTMCNC: 29.9 PG — SIGNIFICANT CHANGE UP (ref 27–34)
MCHC RBC-ENTMCNC: 29.9 PG — SIGNIFICANT CHANGE UP (ref 27–34)
MCHC RBC-ENTMCNC: 32.7 % — SIGNIFICANT CHANGE UP (ref 32–36)
MCHC RBC-ENTMCNC: 32.7 % — SIGNIFICANT CHANGE UP (ref 32–36)
MCV RBC AUTO: 91.4 FL — SIGNIFICANT CHANGE UP (ref 80–100)
MCV RBC AUTO: 91.4 FL — SIGNIFICANT CHANGE UP (ref 80–100)
MONOCYTES # BLD AUTO: 0.74 K/UL — SIGNIFICANT CHANGE UP (ref 0–0.9)
MONOCYTES NFR BLD AUTO: 10 % — SIGNIFICANT CHANGE UP (ref 2–14)
NEUTROPHILS # BLD AUTO: 5.24 K/UL — SIGNIFICANT CHANGE UP (ref 1.8–7.4)
NEUTROPHILS NFR BLD AUTO: 70.5 % — SIGNIFICANT CHANGE UP (ref 43–77)
NRBC # FLD: 0 — SIGNIFICANT CHANGE UP
NRBC # FLD: 0 — SIGNIFICANT CHANGE UP
PHOSPHATE SERPL-MCNC: 3.4 MG/DL — SIGNIFICANT CHANGE UP (ref 2.5–4.5)
PLATELET # BLD AUTO: 460 K/UL — HIGH (ref 150–400)
PLATELET # BLD AUTO: 460 K/UL — HIGH (ref 150–400)
PMV BLD: 9.7 FL — SIGNIFICANT CHANGE UP (ref 7–13)
PMV BLD: 9.7 FL — SIGNIFICANT CHANGE UP (ref 7–13)
POTASSIUM SERPL-MCNC: 4.3 MMOL/L — SIGNIFICANT CHANGE UP (ref 3.5–5.3)
POTASSIUM SERPL-SCNC: 4.3 MMOL/L — SIGNIFICANT CHANGE UP (ref 3.5–5.3)
PROT SERPL-MCNC: 5.9 G/DL — LOW (ref 6–8.3)
PROTHROM AB SERPL-ACNC: 12.9 SEC — SIGNIFICANT CHANGE UP (ref 9.8–13.1)
RBC # BLD: 4.08 M/UL — SIGNIFICANT CHANGE UP (ref 3.8–5.2)
RBC # BLD: 4.08 M/UL — SIGNIFICANT CHANGE UP (ref 3.8–5.2)
RBC # FLD: 14.3 % — SIGNIFICANT CHANGE UP (ref 10.3–14.5)
RBC # FLD: 14.3 % — SIGNIFICANT CHANGE UP (ref 10.3–14.5)
SODIUM SERPL-SCNC: 144 MMOL/L — SIGNIFICANT CHANGE UP (ref 135–145)
WBC # BLD: 7.43 K/UL — SIGNIFICANT CHANGE UP (ref 3.8–10.5)
WBC # BLD: 7.43 K/UL — SIGNIFICANT CHANGE UP (ref 3.8–10.5)
WBC # FLD AUTO: 7.43 K/UL — SIGNIFICANT CHANGE UP (ref 3.8–10.5)
WBC # FLD AUTO: 7.43 K/UL — SIGNIFICANT CHANGE UP (ref 3.8–10.5)

## 2018-09-15 PROCEDURE — 71045 X-RAY EXAM CHEST 1 VIEW: CPT | Mod: 26

## 2018-09-15 PROCEDURE — 99233 SBSQ HOSP IP/OBS HIGH 50: CPT

## 2018-09-15 RX ORDER — ACETAMINOPHEN 500 MG
750 TABLET ORAL ONCE
Qty: 0 | Refills: 0 | Status: COMPLETED | OUTPATIENT
Start: 2018-09-15 | End: 2018-09-16

## 2018-09-15 RX ADMIN — APIXABAN 2.5 MILLIGRAM(S): 2.5 TABLET, FILM COATED ORAL at 06:05

## 2018-09-15 RX ADMIN — Medication 25 MILLIGRAM(S): at 22:43

## 2018-09-15 RX ADMIN — Medication 25 MILLIGRAM(S): at 06:06

## 2018-09-15 RX ADMIN — Medication 3 MILLIGRAM(S): at 22:43

## 2018-09-15 RX ADMIN — Medication 500 MICROGRAM(S): at 10:12

## 2018-09-15 RX ADMIN — LEVALBUTEROL 0.63 MILLIGRAM(S): 1.25 SOLUTION, CONCENTRATE RESPIRATORY (INHALATION) at 03:44

## 2018-09-15 RX ADMIN — FAMOTIDINE 20 MILLIGRAM(S): 10 INJECTION INTRAVENOUS at 12:08

## 2018-09-15 RX ADMIN — LEVALBUTEROL 0.63 MILLIGRAM(S): 1.25 SOLUTION, CONCENTRATE RESPIRATORY (INHALATION) at 16:04

## 2018-09-15 RX ADMIN — Medication 500 MICROGRAM(S): at 22:56

## 2018-09-15 RX ADMIN — LEVALBUTEROL 0.63 MILLIGRAM(S): 1.25 SOLUTION, CONCENTRATE RESPIRATORY (INHALATION) at 10:12

## 2018-09-15 RX ADMIN — Medication 500 MICROGRAM(S): at 16:04

## 2018-09-15 RX ADMIN — Medication 500 MICROGRAM(S): at 03:44

## 2018-09-15 RX ADMIN — Medication 25 MILLIGRAM(S): at 15:44

## 2018-09-15 RX ADMIN — DORNASE ALFA 2.5 MILLIGRAM(S): 1 SOLUTION RESPIRATORY (INHALATION) at 10:14

## 2018-09-15 RX ADMIN — APIXABAN 2.5 MILLIGRAM(S): 2.5 TABLET, FILM COATED ORAL at 19:14

## 2018-09-15 RX ADMIN — LEVALBUTEROL 0.63 MILLIGRAM(S): 1.25 SOLUTION, CONCENTRATE RESPIRATORY (INHALATION) at 22:56

## 2018-09-15 NOTE — PROGRESS NOTE ADULT - SUBJECTIVE AND OBJECTIVE BOX
Infectious Diseases progress note:    Subjective: Resting in recliner.  No fevers or leukocytosis.  No acute o/n events.  Pt with Baledrrama in, failed TOV    ROS:  CONSTITUTIONAL:  No fever, chills, rigors  CARDIOVASCULAR:  No chest pain or palpitations  RESPIRATORY:   No SOB, cough, dyspnea on exertion.  No wheezing  GASTROINTESTINAL:  No abd pain, N/V, diarrhea/constipation  EXTREMITIES:  No swelling or joint pain  GENITOURINARY:  No burning on urination, increased frequency or urgency.  No flank pain  NEUROLOGIC:  No HA, visual disturbances  SKIN: No rashes    Allergies    No Known Allergies    Intolerances        ANTIBIOTICS/RELEVANT:  antimicrobials    immunologic:    OTHER:  acetaminophen  IVPB .. 750 milliGRAM(s) IV Intermittent once PRN  apixaban 2.5 milliGRAM(s) Oral every 12 hours  dornase selina Solution 2.5 milliGRAM(s) Inhalation daily  famotidine    Tablet 20 milliGRAM(s) Oral daily  ipratropium    for Nebulization 500 MICROGram(s) Nebulizer every 6 hours  lactated ringers. 1000 milliLiter(s) IV Continuous <Continuous>  levalbuterol Inhalation 0.63 milliGRAM(s) Inhalation every 6 hours  melatonin 3 milliGRAM(s) Oral at bedtime PRN  metoprolol tartrate 25 milliGRAM(s) Oral every 8 hours      Objective:  Vital Signs Last 24 Hrs  T(C): 36.8 (15 Sep 2018 12:00), Max: 36.8 (15 Sep 2018 12:00)  T(F): 98.2 (15 Sep 2018 12:00), Max: 98.2 (15 Sep 2018 12:00)  HR: 88 (15 Sep 2018 14:00) (63 - 91)  BP: 126/60 (15 Sep 2018 14:00) (113/61 - 147/62)  BP(mean): 76 (15 Sep 2018 14:00) (64 - 105)  RR: 27 (15 Sep 2018 14:00) (17 - 27)  SpO2: 95% (15 Sep 2018 14:00) (94% - 100%)    PHYSICAL EXAM:  Constitutional:NAD  Eyes:ELE, EOMI  Ear/Nose/Throat: no thrush, mucositis.  Moist mucous membranes	  Neck:no JVD, no lymphadenopathy, supple  Respiratory: CTA lanette  Cardiovascular: S1S2 RRR, no murmurs  Gastrointestinal:soft, nontender,  nondistended (+) BS, peg  Extremities:no e/e/c  Skin:  no rashes, open wounds or ulcerations  :  Balderrama draining dark yellow urine        LABS:                        12.2   7.43  )-----------( 460      ( 15 Sep 2018 02:30 )             37.3     09-15    144  |  101  |  23  ----------------------------<  102<H>  4.3   |  28  |  0.40<L>    Ca    8.9      15 Sep 2018 02:30  Phos  3.4     09-15  Mg     2.1     09-15    TPro  5.9<L>  /  Alb  2.6<L>  /  TBili  0.4  /  DBili  x   /  AST  125<H>  /  ALT  151<H>  /  AlkPhos  170<H>  09-15    PT/INR - ( 15 Sep 2018 02:30 )   PT: 12.9 SEC;   INR: 1.12          PTT - ( 15 Sep 2018 02:30 )  PTT:33.9 SEC  Urinalysis Basic - ( 14 Sep 2018 03:40 )    Color: YELLOW / Appearance: HAZY / S.020 / pH: 6.5  Gluc: NEGATIVE / Ketone: NEGATIVE  / Bili: NEGATIVE / Urobili: NORMAL   Blood: LARGE / Protein: 30 / Nitrite: NEGATIVE   Leuk Esterase: NEGATIVE / RBC: 5-10 / WBC 3-5   Sq Epi: OCC / Non Sq Epi: x / Bacteria: FEW                          MICROBIOLOGY:    Culture - Blood (18 @ 11:42)    Culture - Blood:   NO ORGANISMS ISOLATED    Specimen Source: BLOOD VENOUS          RADIOLOGY & ADDITIONAL STUDIES:    < from: Xray Chest 1 View- PORTABLE-Routine (18 @ 06:41) >  INTERPRETATION:     No right chest tube identified. Right basilar opacity again seen   consistent with pneumonia/atelectasis. Trace right effusion is present as   well.    Remainder of the lungs are free of focal consolidations. The heart is not   enlarged. No pneumothorax.      COMPARISON:        IMPRESSION:  Follow-up with possible right lower lobe pneumonia and small   effusion. No pneumothorax.    < end of copied text >

## 2018-09-15 NOTE — PROGRESS NOTE ADULT - SUBJECTIVE AND OBJECTIVE BOX
81 F PMH COPD, c/o SOB, REED, fatigue, weight loss of 15 lbs in last 6 months, reports occasional nonproductive cough, s/p CXR, followed by CT scan, reports multiple right lung nodules noted admitted  for lung resection.    [id number]:   FOB, RVATS /   RULobectomy   08/27/2018 ( path + adenocarcinoma)  [id number]   FOB  [id number]   FOB  [id number]   FOB  [id number]   FOB  [id number]:  PEG              Issues:   Recurrent Aspiration Pneumonia  Atelectasis/  right pleural effusion  Shortness of breath / Bronchospasm / COPD exacerbation  postop SVT / a-FIB with pauses              Postop pain              HTN              Dysphagia              Malnutrition              hypophosphatemia              Urinary retention ( Balderrama reinserted 9/10/18)    Events   consulted - recommended Balderrama to remain in place for next few days before next trial of voiding. Right pigtail d/trina yesterday  2 week course of ABXs completed 9/12/18. Octreotide and TG  d/trina         Hemodynamically stable overnight.  OOB to chair, breathing comfortably with minimal pain. Ambulated several times . No SOB, palpitations, N/V, dizziness      PAST MEDICAL & SURGICAL HISTORY:  Lung nodules  COPD (Chronic Obstructive Pulmonary Disease): diagnosed in ~2013  Colon Polyps  History of Osteoporosis  History of Cataract Surgery: left eye  Hip Replacement: bilateral               Home Medications:  Advair Diskus 250 mcg-50 mcg inhalation powder: 1 puff(s) inhaled 2 times a day (27 Aug 2018 12:42)  Spiriva 18 mcg inhalation capsule: 1 cap(s) inhaled once a day (27 Aug 2018 12:42)    Allergies  No Known Allergies    MEDICATIONS  (STANDING):  apixaban 2.5 milliGRAM(s) Oral every 12 hours  dornase selina Solution 2.5 milliGRAM(s) Inhalation daily  famotidine    Tablet 20 milliGRAM(s) Oral daily  ipratropium    for Nebulization 500 MICROGram(s) Nebulizer every 6 hours  lactated ringers. 1000 milliLiter(s) (30 mL/Hr) IV Continuous <Continuous>  levalbuterol Inhalation 0.63 milliGRAM(s) Inhalation every 6 hours  metoprolol tartrate 25 milliGRAM(s) Oral every 8 hours    MEDICATIONS  (PRN):  melatonin 3 milliGRAM(s) Oral at bedtime PRN Sleep    ICU Vital Signs Last 24 Hrs  T(C): 36.6 (15 Sep 2018 04:00), Max: 36.9 (14 Sep 2018 12:00)  T(F): 97.9 (15 Sep 2018 04:00), Max: 98.5 (14 Sep 2018 12:00)  HR: 73 (15 Sep 2018 08:00) (61 - 91)  BP: 119/60 (15 Sep 2018 07:00) (113/61 - 141/53)  BP(mean): 74 (15 Sep 2018 07:00) (64 - 83)  RR: 22 (15 Sep 2018 08:00) (17 - 27)  SpO2: 96% (15 Sep 2018 08:00) (94% - 100%)      Physical exam:                           General:            WD chronically ill apearing in NAD   Neuro:              A& O X3, Moving all extremities to commands. No focal deficits	  HEENT:              QUINN  Respiratory:	 Equal breath sounds B/L, Good B/L air exchange, - Rales, few  rhonchi, no wheezing. Effort even and unlabored.  Chest:                Right chest surgical site  clean  CV:		Regular rate and rhythm. Normal S1/S2.   Abdomen:         + PEG, + Bowel sounds, Soft,  nontender, not-distended.   Skin:		No rash.  Extremities:	Warm, no cyanosis or edema.  Palpable pulses       I&O's Summary    14 Sep 2018 07:01  -  15 Sep 2018 07:00  --------------------------------------------------------  IN: 1670 mL / OUT: 855 mL / NET: 815 mL    15 Sep 2018 07:01  -  15 Sep 2018 08:49  --------------------------------------------------------  IN: 85 mL / OUT: 0 mL / NET: 85 mL    Labs:                                                                           12.2   7.43  )-----------( 460      ( 15 Sep 2018 02:30 )             37.3                            09-15    144  |  101  |  23  ----------------------------<  102<H>  4.3   |  28  |  0.40<L>    Ca    8.9      15 Sep 2018 02:30  Phos  3.4     09-15  Mg     2.1     09-15    TPro  5.9<L>  /  Alb  2.6<L>  /  TBili  0.4  /  DBili  x   /  AST  125<H>  /  ALT  151<H>  /  AlkPhos  170<H>  09-15    LIVER FUNCTIONS - ( 15 Sep 2018 02:30 )  Alb: 2.6 g/dL / Pro: 5.9 g/dL / ALK PHOS: 170 u/L / ALT: 151 u/L / AST: 125 u/L / GGT: x                                PT/INR - ( 15 Sep 2018 02:30 )   PT: 12.9 SEC;   INR: 1.12     PTT - ( 15 Sep 2018 02:30 )  PTT:33.9 SEC           CXR  [id number]  INTERPRETATION:   No right chest tube identified. Right basilar opacity again seen   consistent with pneumonia/atelectasis. Trace right effusion is present as   well.  Remainder of the lungs are free of focal consolidations. The heart is not   enlarged. No pneumothorax.  COMPARISON:  September 13  IMPRESSION:  Follow-up with possible right lower lobe pneumonia and small   effusion. No pneumothorax.      Plan:  81 F PMH COPD, c/o SOB, REED, fatigue, weight loss of 15 lbs in last 6 months, reports occasional nonproductive cough, s/p CXR, followed by CT scan, reports multiple right lung nodules noted admitted  for lung resection.  [id number]:                               FOB, RVATS /   RULobectomy   08/27/2018 ( path + adenocarcinoma)  8/30, 08/31, 09/01, 9/11/18:  FOB   [id number]:                               PEG            Issues:   Recurrent Aspiration Pneumonia  Atelectasis/  right pleural effusion  Shortness of breath / Bronchospasm / COPD exacerbation  postop SVT / a-FIB with pauses              Postop pain              HTN              Dysphagia              Malnutrition              hypophosphatemia              Urinary retention ( Balderrama reinserted 9/10/18)                               Neuro:                                         Pain control with PCA / PCEA / Tylenol IV / Toradol / Percocet,                                         Pt is on Precedex for agitation                                        Pt is sedated with Propofol / Fentanyl                            Cardiovascular:                                          Continue hemodynamic monitoring.                                         Not on any pressors                                         Continue cardiovascular / antihypertensive medications      Postop SVT / A-fb: on  Lopressor 25mg q8hrs via PEG, increase as tolerated &  no more pauses.     On Eliquis for anticoagulation as per EP       HTN:  Increase lopressor as tolerated                            Respiratory:                                         Pt is on nasal canula                                          Comfortable, no distress.                                         Using incentive spirometry   	      Continue bronchodilators, pulmonary toilet     dornase selina Solution 2.5 milliGRAM(s) Inhalation daily     ipratropium    for Nebulization 500 MICROGram(s) Nebulizer every 6 hours     levalbuterol Inhalation 0.63 milliGRAM(s) Inhalation every 6 hours     Head of bed elevation to 30-40 degrees    OOB, ambulation                            GI  PEG feeds with Vivonex at goal 55ml / hr  Continue GI prophylaxis with Pepcid   Zofran / Reglan for nausea - PRN	  ? R  Chylothorax   - On Vivonex    MCT oil and   Octreotide drip completed and d/trina                                     Renal:                                         Continue IVF                                         Monitor I/Os and electrolytes                                         Keep Balderrama                                         Has to stay in for extended time in view of urinary       retention requiring multiple catheter reinsertion (  on board)                                                 Hematologic / Oncology:                                         SQH & SCDs for VTE prophylaxis                                         Monitor chest tube output. No signs of active bleeding.                                          Follow CBCs     No signs of active bleeding.                            Infectious disease:      No signs of new infection.       Monitor for fever / leukocytosis.                  All surgical incision / chest tube  sites look clean                            Endocrine:                                           Continue Finger stick glucose checks with coverage    All available pertinent clinical, laboratory, radiographic, hemodynamic, echocardiographic, respiratory data, microbiologic data and chart were reviewed and analyzed frequently throughout the course of the day and night. GI and DVT prophylaxis, glycemic control, head of bed elevation and skin care issues were addressed.  Patient seen, examined and plan discussed with CT Surgery / CTICU team during rounds.    I have spent   35   minutes of critical care time with this pt between 8  am and 2359 pm         minutes spent on total encounter; more than 50% of the visit was spent counseling and/or coordinating care by the attending physician.    Napoleon Barron MD

## 2018-09-15 NOTE — PROGRESS NOTE ADULT - ASSESSMENT
83 y.o. female with hx of COPD, no significant cardiac history presenting with intraop- postop SVT s/p Right upper lobectomy.  Pt has been c/o fatigue, weight loss of 15 lbs in last 6 months, and reports occasional nonproductive cough.  CXR and subsequent CT scan, reports multiple right lung nodules.  Pt admitted  for lung resection.  Pt underwent lobectomy of right lung  with video-assisted thoracoscopic surgery  on 8/27/2018 and flexible bronchoscopy with bronchopulmonary lavage.  She has a right chest tube in place.      Post op course complicated by SVT and A.fib with pauses and pneumothorax.         Pt currently without fever.  WBC 14.1 on 8/28 --> 10.5.  Pt was given a dose of rocephin on 8/30 for possible pneumonia.  During bedside bronchoscopy pt noted to have copious murky secretions concerning for infection and oropharyngeal candidiases.  ID consult called for further antibiotic managment.     Problem/Plan - 1:    ·	Pna    - Pt s/p RUL lobectomy, s/p bronch/lavage, chest tube to suction.  Noted to have copious purulent secretions and oropharyngeal candidiasis.      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  Repeat bronchoscopy shown to have improved secretions.  Pigtail removed on 9/13    No new ID recs at this time.  Recommend d/c abx and observe off.  If pt spikes fever > 100.4 send repeat bcx x 2    d/c planning as per ctu        Margareth Hodge  [phone]

## 2018-09-16 ENCOUNTER — TRANSCRIPTION ENCOUNTER (OUTPATIENT)
Age: 83
End: 2018-09-16

## 2018-09-16 LAB
BUN SERPL-MCNC: 28 MG/DL — HIGH (ref 7–23)
CA-I BLD-SCNC: 1.14 MMOL/L — SIGNIFICANT CHANGE UP (ref 1.03–1.23)
CALCIUM SERPL-MCNC: 8.7 MG/DL — SIGNIFICANT CHANGE UP (ref 8.4–10.5)
CHLORIDE SERPL-SCNC: 103 MMOL/L — SIGNIFICANT CHANGE UP (ref 98–107)
CO2 SERPL-SCNC: 31 MMOL/L — SIGNIFICANT CHANGE UP (ref 22–31)
CREAT SERPL-MCNC: 0.42 MG/DL — LOW (ref 0.5–1.3)
GLUCOSE SERPL-MCNC: 128 MG/DL — HIGH (ref 70–99)
HCT VFR BLD CALC: 34.2 % — LOW (ref 34.5–45)
HGB BLD-MCNC: 11.6 G/DL — SIGNIFICANT CHANGE UP (ref 11.5–15.5)
MAGNESIUM SERPL-MCNC: 2.2 MG/DL — SIGNIFICANT CHANGE UP (ref 1.6–2.6)
MCHC RBC-ENTMCNC: 31.5 PG — SIGNIFICANT CHANGE UP (ref 27–34)
MCHC RBC-ENTMCNC: 33.9 % — SIGNIFICANT CHANGE UP (ref 32–36)
MCV RBC AUTO: 92.9 FL — SIGNIFICANT CHANGE UP (ref 80–100)
NRBC # FLD: 0 — SIGNIFICANT CHANGE UP
PHOSPHATE SERPL-MCNC: 3.2 MG/DL — SIGNIFICANT CHANGE UP (ref 2.5–4.5)
PLATELET # BLD AUTO: 415 K/UL — HIGH (ref 150–400)
PMV BLD: 10.3 FL — SIGNIFICANT CHANGE UP (ref 7–13)
POTASSIUM SERPL-MCNC: 4 MMOL/L — SIGNIFICANT CHANGE UP (ref 3.5–5.3)
POTASSIUM SERPL-SCNC: 4 MMOL/L — SIGNIFICANT CHANGE UP (ref 3.5–5.3)
RBC # BLD: 3.68 M/UL — LOW (ref 3.8–5.2)
RBC # FLD: 14.6 % — HIGH (ref 10.3–14.5)
SODIUM SERPL-SCNC: 143 MMOL/L — SIGNIFICANT CHANGE UP (ref 135–145)
WBC # BLD: 7.48 K/UL — SIGNIFICANT CHANGE UP (ref 3.8–10.5)
WBC # FLD AUTO: 7.48 K/UL — SIGNIFICANT CHANGE UP (ref 3.8–10.5)

## 2018-09-16 PROCEDURE — 71045 X-RAY EXAM CHEST 1 VIEW: CPT | Mod: 26

## 2018-09-16 RX ADMIN — LEVALBUTEROL 0.63 MILLIGRAM(S): 1.25 SOLUTION, CONCENTRATE RESPIRATORY (INHALATION) at 09:42

## 2018-09-16 RX ADMIN — LEVALBUTEROL 0.63 MILLIGRAM(S): 1.25 SOLUTION, CONCENTRATE RESPIRATORY (INHALATION) at 15:03

## 2018-09-16 RX ADMIN — Medication 500 MICROGRAM(S): at 15:03

## 2018-09-16 RX ADMIN — Medication 25 MILLIGRAM(S): at 14:20

## 2018-09-16 RX ADMIN — Medication 750 MILLIGRAM(S): at 02:30

## 2018-09-16 RX ADMIN — FAMOTIDINE 20 MILLIGRAM(S): 10 INJECTION INTRAVENOUS at 14:20

## 2018-09-16 RX ADMIN — LEVALBUTEROL 0.63 MILLIGRAM(S): 1.25 SOLUTION, CONCENTRATE RESPIRATORY (INHALATION) at 22:21

## 2018-09-16 RX ADMIN — APIXABAN 2.5 MILLIGRAM(S): 2.5 TABLET, FILM COATED ORAL at 05:34

## 2018-09-16 RX ADMIN — Medication 300 MILLIGRAM(S): at 01:59

## 2018-09-16 RX ADMIN — DORNASE ALFA 2.5 MILLIGRAM(S): 1 SOLUTION RESPIRATORY (INHALATION) at 09:36

## 2018-09-16 RX ADMIN — Medication 500 MICROGRAM(S): at 05:18

## 2018-09-16 RX ADMIN — Medication 500 MICROGRAM(S): at 09:27

## 2018-09-16 RX ADMIN — APIXABAN 2.5 MILLIGRAM(S): 2.5 TABLET, FILM COATED ORAL at 18:09

## 2018-09-16 RX ADMIN — Medication 25 MILLIGRAM(S): at 05:34

## 2018-09-16 RX ADMIN — Medication 500 MICROGRAM(S): at 22:21

## 2018-09-16 NOTE — PROGRESS NOTE ADULT - REASON FOR ADMISSION
lobectomy
Lung Nodule- s/p lobectomy VATS  Post procedure developed PAF
Lung nodule/lobectomy
Lung nodules
Lung nodules/lobectomy
RVATS, lung resection 8/27

## 2018-09-16 NOTE — PROGRESS NOTE ADULT - ASSESSMENT
Pt w/ no new complaints, resting comfortably in bed. Transferred from [facility] yesterday. S/P RVATS, RUL lobectomy for adeno 2/27, post op course complicated by SVT/ aspirated, urinary retention w/ multiple failure to void/failed S/S requiring PEG.     f/u am labs, cont GI/DVT prophylaxis and pulm toilet/nebs. cont eliquis, ambulate with assist. Cont soriano, tube feeds. Anticipate discharge to rehab tomorrow Pt w/ no new complaints, resting comfortably in bed. Transferred from [facility] yesterday. S/P RVATS, RUL lobectomy for adeno 2/27, post op course complicated by SVT/ aspirated, urinary retention w/ multiple failure to void/failed S/S requiring PEG.     f/u am labs, cont GI/DVT prophylaxis and pulm toilet/nebs. cont eliquis, ambulate with assist. Cont soriano, tube feeds. Added free water via PEG.  Anticipate discharge to rehab tomorrow

## 2018-09-16 NOTE — PROGRESS NOTE ADULT - SUBJECTIVE AND OBJECTIVE BOX
Pt w/ no new complaints, resting comfortably in bed. Transferred from [facility] yesterday. S/P RVATS, RUL lobectomy for adeno 2/27, post op course complicated by SVT/ aspirated, urinary retention w/ multiple failure to void/failed S/S requiring PEG.     ICU Vital Signs Last 24 Hrs  T(C): 37 (16 Sep 2018 05:32), Max: 37 (16 Sep 2018 05:32)  T(F): 98.6 (16 Sep 2018 05:32), Max: 98.6 (16 Sep 2018 05:32)  HR: 79 (16 Sep 2018 05:32) (68 - 105)  BP: 131/53 (16 Sep 2018 05:32) (114/61 - 147/62)  BP(mean): 74 (15 Sep 2018 17:00) (73 - 105)  ABP: --  ABP(mean): --  RR: 18 (16 Sep 2018 05:32) (18 - 31)  SpO2: 98% (16 Sep 2018 05:32) (92% - 99%)      I&O's Summary    15 Sep 2018 07:01  -  16 Sep 2018 07:00  --------------------------------------------------------  IN: 905 mL / OUT: 780 mL / NET: 125 mL    MEDICATIONS  (STANDING):  apixaban 2.5 milliGRAM(s) Oral every 12 hours  dornase selina Solution 2.5 milliGRAM(s) Inhalation daily  famotidine    Tablet 20 milliGRAM(s) Oral daily  ipratropium    for Nebulization 500 MICROGram(s) Nebulizer every 6 hours  lactated ringers. 1000 milliLiter(s) (30 mL/Hr) IV Continuous <Continuous>  levalbuterol Inhalation 0.63 milliGRAM(s) Inhalation every 6 hours  metoprolol tartrate 25 milliGRAM(s) Oral every 8 hours    MEDICATIONS  (PRN):  melatonin 3 milliGRAM(s) Oral at bedtime PRN Sleep                          11.6   7.48  )-----------( 415      ( 16 Sep 2018 06:25 )             34.2   09-15    144  |  101  |  23  ----------------------------<  102<H>  4.3   |  28  |  0.40<L>    Ca    8.9      15 Sep 2018 02:30  Phos  3.4     09-15  Mg     2.1     09-15    TPro  5.9<L>  /  Alb  2.6<L>  /  TBili  0.4  /  DBili  x   /  AST  125<H>  /  ALT  151<H>  /  AlkPhos  170<H>  09-15      General: WN/WD NAD  Neurology: A&Ox3, nonfocal, BALTAZAR x 4  Eyes:  Gross vision intact  ENT/Neck: Neck supple, trachea midline, No JVD, Gross hearing intact  Respiratory: CTA B/L, No wheezing, rales, rhonchi  CV: RRR, S1S2, no murmurs, rubs or gallops  Abdominal: Soft, NT, ND +BS,   Extremities: No edema, + peripheral pulses  Skin: No Rashes, Hematoma, Ecchymosis  Incisions: wound c/d/i  PEG site clean, tolerating tube feeds    CXR: stable right basilar atelectasis Pt w/ no new complaints, resting comfortably in bed. Transferred from [facility] yesterday. S/P RVATS, RUL lobectomy for adeno 2/27, post op course complicated by SVT/ aspirated, urinary retention w/ multiple failure to void/failed S/S requiring PEG.     ICU Vital Signs Last 24 Hrs  T(C): 37 (16 Sep 2018 05:32), Max: 37 (16 Sep 2018 05:32)  T(F): 98.6 (16 Sep 2018 05:32), Max: 98.6 (16 Sep 2018 05:32)  HR: 79 (16 Sep 2018 05:32) (68 - 105)  BP: 131/53 (16 Sep 2018 05:32) (114/61 - 147/62)  BP(mean): 74 (15 Sep 2018 17:00) (73 - 105)  ABP: --  ABP(mean): --  RR: 18 (16 Sep 2018 05:32) (18 - 31)  SpO2: 98% (16 Sep 2018 05:32) (92% - 99%)      I&O's Summary    15 Sep 2018 07:01  -  16 Sep 2018 07:00  --------------------------------------------------------  IN: 905 mL / OUT: 780 mL / NET: 125 mL    MEDICATIONS  (STANDING):  apixaban 2.5 milliGRAM(s) Oral every 12 hours  dornase selina Solution 2.5 milliGRAM(s) Inhalation daily  famotidine    Tablet 20 milliGRAM(s) Oral daily  ipratropium    for Nebulization 500 MICROGram(s) Nebulizer every 6 hours  lactated ringers. 1000 milliLiter(s) (30 mL/Hr) IV Continuous <Continuous>  levalbuterol Inhalation 0.63 milliGRAM(s) Inhalation every 6 hours  metoprolol tartrate 25 milliGRAM(s) Oral every 8 hours    MEDICATIONS  (PRN):  melatonin 3 milliGRAM(s) Oral at bedtime PRN Sleep                          11.6   7.48  )-----------( 415      ( 16 Sep 2018 06:25 )             34.2   09-15    144  |  101  |  23  ----------------------------<  102<H>  4.3   |  28  |  0.40<L>    Ca    8.9      15 Sep 2018 02:30  Phos  3.4     09-15  Mg     2.1     09-15    TPro  5.9<L>  /  Alb  2.6<L>  /  TBili  0.4  /  DBili  x   /  AST  125<H>  /  ALT  151<H>  /  AlkPhos  170<H>  09-15      General: WN/WD NAD  Neurology: A&Ox3, nonfocal, BALTAZAR x 4  Eyes:  Gross vision intact  ENT/Neck: Neck supple, trachea midline, No JVD, Gross hearing intact, mucous membranes dry  Respiratory: CTA B/L, No wheezing, rales, rhonchi  CV: RRR, S1S2, no murmurs, rubs or gallops  Abdominal: Soft, NT, ND +BS,   Extremities: No edema, + peripheral pulses  Skin: No Rashes, Hematoma, Ecchymosis  Incisions: wound c/d/i  PEG site clean, tolerating tube feeds    CXR: stable right basilar atelectasis

## 2018-09-17 VITALS
DIASTOLIC BLOOD PRESSURE: 53 MMHG | OXYGEN SATURATION: 94 % | TEMPERATURE: 98 F | RESPIRATION RATE: 16 BRPM | SYSTOLIC BLOOD PRESSURE: 122 MMHG | HEART RATE: 81 BPM

## 2018-09-17 PROCEDURE — 99238 HOSP IP/OBS DSCHRG MGMT 30/<: CPT

## 2018-09-17 PROCEDURE — 71045 X-RAY EXAM CHEST 1 VIEW: CPT | Mod: 26

## 2018-09-17 RX ORDER — METOPROLOL TARTRATE 50 MG
1.5 TABLET ORAL
Qty: 0 | Refills: 0 | COMMUNITY
Start: 2018-09-17

## 2018-09-17 RX ORDER — LANOLIN ALCOHOL/MO/W.PET/CERES
1 CREAM (GRAM) TOPICAL
Qty: 0 | Refills: 0 | COMMUNITY
Start: 2018-09-17

## 2018-09-17 RX ORDER — ACETAMINOPHEN 500 MG
2 TABLET ORAL
Qty: 0 | Refills: 0 | COMMUNITY

## 2018-09-17 RX ORDER — DORNASE ALFA 1 MG/ML
2.5 SOLUTION RESPIRATORY (INHALATION)
Qty: 0 | Refills: 0 | COMMUNITY
Start: 2018-09-17

## 2018-09-17 RX ORDER — LEVALBUTEROL 1.25 MG/.5ML
3 SOLUTION, CONCENTRATE RESPIRATORY (INHALATION)
Qty: 0 | Refills: 0 | COMMUNITY
Start: 2018-09-17

## 2018-09-17 RX ORDER — APIXABAN 2.5 MG/1
1 TABLET, FILM COATED ORAL
Qty: 0 | Refills: 0 | COMMUNITY
Start: 2018-09-17

## 2018-09-17 RX ORDER — IPRATROPIUM BROMIDE 0.2 MG/ML
2.5 SOLUTION, NON-ORAL INHALATION
Qty: 0 | Refills: 0 | COMMUNITY
Start: 2018-09-17

## 2018-09-17 RX ADMIN — LEVALBUTEROL 0.63 MILLIGRAM(S): 1.25 SOLUTION, CONCENTRATE RESPIRATORY (INHALATION) at 10:46

## 2018-09-17 RX ADMIN — DORNASE ALFA 2.5 MILLIGRAM(S): 1 SOLUTION RESPIRATORY (INHALATION) at 10:48

## 2018-09-17 RX ADMIN — APIXABAN 2.5 MILLIGRAM(S): 2.5 TABLET, FILM COATED ORAL at 05:42

## 2018-09-17 RX ADMIN — FAMOTIDINE 20 MILLIGRAM(S): 10 INJECTION INTRAVENOUS at 12:36

## 2018-09-17 RX ADMIN — Medication 25 MILLIGRAM(S): at 12:36

## 2018-09-17 RX ADMIN — Medication 25 MILLIGRAM(S): at 05:42

## 2018-09-17 RX ADMIN — Medication 500 MICROGRAM(S): at 10:47

## 2018-09-17 NOTE — SWALLOW BEDSIDE ASSESSMENT ADULT - SWALLOW EVAL: RECOMMENDED DIET
1) Continue NPO with non-oral means of nutrition/hydration/medication; 2) MD to consider cinesophagram to assess the oropharyngeal swallow mechanism as overall medical status improves
1) Continue NPO with non-oral means of nutrition/hydration/medication; 2) Cinesophagram (inpatient vs outpatient following discharge to appropriate setting)

## 2018-09-17 NOTE — DISCHARGE NOTE ADULT - CARE PROVIDER_API CALL
Roni Gallegos), Surgery; Thoracic Surgery  [address] [place] [address]  Phone: [phone]  Fax: [phone]

## 2018-09-17 NOTE — SWALLOW BEDSIDE ASSESSMENT ADULT - ASR SWALLOW ASPIRATION MONITOR
oral hygiene/fever/pneumonia/position upright (90Y)/cough/gurgly voice/change of breathing pattern/throat clearing/upper respiratory infection
pneumonia/upper respiratory infection/fever/throat clearing/oral hygiene/gurgly voice/cough/change of breathing pattern/position upright (90Y)

## 2018-09-17 NOTE — SWALLOW BEDSIDE ASSESSMENT ADULT - PHARYNGEAL PHASE
Delayed pharyngeal swallow/Decreased laryngeal elevation/inconsistent change in vocal quality to "wet" tone after the swallow
Cough post oral intake/Delayed pharyngeal swallow/Decreased laryngeal elevation

## 2018-09-17 NOTE — DISCHARGE NOTE ADULT - INSTRUCTIONS
Tube feeds- Jevity @ 55 mL/h Nothing by mouth until repeat cinesophagram completed  Tube feeds- Jevity @ 330 mL/h through PEG x 4 bolus feeds daily   flush PEG with water 150cc after every feed to maintain patency and hydration

## 2018-09-17 NOTE — SWALLOW BEDSIDE ASSESSMENT ADULT - COMMENTS
Patient is an 83 year old female with hx of COPD, c/o SOB, dyspnea on exertion, fatigue, weight loss of 15 lbs in last 6 months, reports occasional nonproductive cough, s/p CXR, followed by CT scan, reports multiple right lung nodules noted, admitted for lung resection, s/p RVATS / Right upper lobectomy (8/27/2018), s/p bronchoscopy (8/30, 8/31, 9/01, 9/11/18), s/p PEG (9/5/18). Patient pending d/c to [facility] this PM.      Patient is known to this department from multiple encounters during this acute care stay. Patient was last seen for cinesophagram on 9/4/18, at which time NPO with consideration for short term/long term non-oral means of nutrition was recommended (please see chart for full report). Patient was received awake, alert and cooperative this AM. Increasing fatigue noted as evaluation progressed. Recommendations discussed with RN and thoracic team at [facility] #[id number].

## 2018-09-17 NOTE — DISCHARGE NOTE ADULT - HOSPITAL COURSE
83 year old female had multiple right-sided lung nodules seen on CT scan.  She underwent a mediastinoscopy on 8/23/18 and pathology showed adenocarcinoma.  She then went for a RULobectomy via VATS on 8/27/18.  Post-op issues included SVT & A-fib requiring a Cardizem drip before conversion back to NSR.  Cardiology had been consulted and an echocardiogram was done.  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  She was noted to have recurrent aspiration.  Work up included a speech and swallow evaluation and a cinesophogram, which she failed, resulting in a PEG placement in 9/5/18. Tube feeds were started and she remained NPO.  She required a bronchoscopy on 8/30, 9/1, 9/3, and 9/11.  She developed urine retention and required a Balderrama catheter and a urology evaluation.  Meanwhile, she completed a two week course of antibiotics for R base PNA under the direction of ID.  She was transferred to  on 9/15 and was accepted to a rehab facility.

## 2018-09-17 NOTE — SWALLOW BEDSIDE ASSESSMENT ADULT - SWALLOW EVAL: RECOMMENDED FEEDING/EATING TECHNIQUES
allow for swallow between intakes/position upright (90 degrees)/small sips/bites/crush medication (when feasible)/maintain upright posture during/after eating for 30 mins/no straws

## 2018-09-17 NOTE — PROGRESS NOTE ADULT - PROVIDER SPECIALTY LIST ADULT
Anesthesia
CT Surgery
Cardiology
Critical Care
Electrophysiology
Infectious Disease
Pain Medicine
Pulmonology
Pulmonology
Thoracic Surgery
Critical Care
Cardiology
Infectious Disease

## 2018-09-17 NOTE — DISCHARGE NOTE ADULT - NS AS ACTIVITY OBS
No Heavy lifting/straining/Showering allowed/Sex allowed/Walking-Indoors allowed/Do not drive or operate machinery/Walking-Outdoors allowed/Stairs allowed

## 2018-09-17 NOTE — DISCHARGE NOTE ADULT - PATIENT PORTAL LINK FT
You can access the [email or web address] Patient Portal, offered by [facility], by registering with the following website: [email or web address]

## 2018-09-17 NOTE — DISCHARGE NOTE ADULT - CARE PROVIDERS DIRECT ADDRESSES
[email or web address]

## 2018-09-17 NOTE — DISCHARGE NOTE ADULT - ADDITIONAL INSTRUCTIONS
See Dr Gallegos in 2 weeks. Call for an appointment and bring a new chest X-ray when you come.  See your PCP as well. See Dr Gallegos in 2 weeks. Call [phone] for an appointment and bring a new chest X-ray when you come.  See your PCP as well.

## 2018-09-17 NOTE — SWALLOW BEDSIDE ASSESSMENT ADULT - SWALLOW EVAL: DIAGNOSIS
Patient demonstrates oropharyngeal dysphagia judged to be exacerbated by overall deconditioning and compromised respiratory status for limited trials of puree and honey-thick liquid administered via teaspoon. The oral stage was characterized by adequate oral acceptance followed by decreased labial seal with delayed bolus collection, manipulation and transfer. The pharyngeal stage is marked by a suspected delay in pharyngeal trigger with decreased hyolaryngeal excursion upon digital palpation. Wet coughing noted post swallow with both consistencies indicating laryngeal penetration vs aspiration.
Patient demonstrates oropharyngeal dysphagia exacerbated by increasing fatigue and characterized by adequate oral acceptance and containment, delayed bolus collection, manipulation and transfer, and a delayed pharyngeal trigger with reduced hyolaryngeal excursion suspected upon digital palpation. Inconsistent changes in vocal quality to "wet" tone noted after the swallow across trials of puree, honey-thick and nectar-thick liquids, suggesting laryngeal penetration vs aspiration. Oral nutrition/hydration/medication remains contraindicated at this time. Patient would benefit from repeat cinesophagram to objectively assess the oral/pharyngeal mechanism, r/o aspiration and determine tolerance for safe initiation of PO intake.

## 2018-09-17 NOTE — DISCHARGE NOTE ADULT - CARE PLAN
Principal Discharge DX:	Lung nodules  Goal:	Wound healing; Development of a long-term treatment plan  Assessment and plan of treatment:	Stable for outpatient follow up

## 2018-09-17 NOTE — SWALLOW BEDSIDE ASSESSMENT ADULT - ORAL PREPARATORY PHASE
delayed bolus collection and manipulation
decreased labial seal; delayed bolus collection and manipulation

## 2018-09-17 NOTE — PROGRESS NOTE ADULT - SUBJECTIVE AND OBJECTIVE BOX
Subjective: pt requesting to try PO diet - agreeable to speech and swallow re-evaluation, otherwise no acute complaints. Awaiting rehab authorization    Vital Signs:  Vital Signs Last 24 Hrs  T(C): 36.9 (09-17-18 @ 07:21), Max: 37 (09-16-18 @ 12:30)  T(F): 98.4 (09-17-18 @ 07:21), Max: 98.6 (09-16-18 @ 12:30)  HR: 78 (09-17-18 @ 07:21) (69 - 90)  BP: 124/53 (09-17-18 @ 07:21) (102/58 - 140/56)  RR: 19 (09-17-18 @ 07:21) (18 - 20)  SpO2: 95% (09-17-18 @ 07:21) (92% - 99%) on (O2)    Telemetry/Alarms:  General: WN/WD NAD  Neurology: Awake, nonfocal, BALTAZAR x 4  Eyes: Scleras clear, PERRLA/ EOMI, Gross vision intact  ENT:Gross hearing intact, grossly patent pharynx, no stridor  Neck: Neck supple, trachea midline, No JVD,   Respiratory: CTA B/L, No wheezing, rales, rhonchi  CV: RRR, S1S2, no murmurs, rubs or gallops  Abdominal: Soft, NT, ND +BS,   Extremities: No edema, + peripheral pulses  Skin: No Rashes, Hematoma, Ecchymosis  Lymphatic: No Neck, axilla, groin LAD  Psych: Oriented x 3, normal affect  Incisions:   Tubes:  Relevant labs, radiology and Medications reviewed                        11.6   7.48  )-----------( 415      ( 16 Sep 2018 06:25 )             34.2     09-16    143  |  103  |  28<H>  ----------------------------<  128<H>  4.0   |  31  |  0.42<L>    Ca    8.7      16 Sep 2018 06:25  Phos  3.2     09-16  Mg     2.2     09-16        MEDICATIONS  (STANDING):  apixaban 2.5 milliGRAM(s) Oral every 12 hours  dornase selina Solution 2.5 milliGRAM(s) Inhalation daily  famotidine    Tablet 20 milliGRAM(s) Oral daily  ipratropium    for Nebulization 500 MICROGram(s) Nebulizer every 6 hours  lactated ringers. 1000 milliLiter(s) (30 mL/Hr) IV Continuous <Continuous>  levalbuterol Inhalation 0.63 milliGRAM(s) Inhalation every 6 hours  metoprolol tartrate 25 milliGRAM(s) Oral every 8 hours    MEDICATIONS  (PRN):  melatonin 3 milliGRAM(s) Oral at bedtime PRN Sleep    Pertinent Physical Exam  I&O's Summary    16 Sep 2018 07:01  -  17 Sep 2018 07:00  --------------------------------------------------------  IN: 690 mL / OUT: 400 mL / NET: 290 mL    17 Sep 2018 07:01  -  17 Sep 2018 09:16  --------------------------------------------------------  IN: 0 mL / OUT: 200 mL / NET: -200 mL        Assessment  Pt w/ no new complaints, resting comfortably in bed. Transferred from [facility] yesterday. 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       PLAN  Neuro: Pain management  Pulm: Encourage coughing, deep breathing and use of incentive spirometry. Wean off supplemental oxygen as able. Daily CXR.   Cardio: Monitor telemetry/alarms  GI: Tolerating tube feeds, will re-consult speech and swallow  Renal: monitor urine output, supplement electrolytes as needed  Vasc: Heparin SC/SCDs for DVT prophylaxis  Heme: Stable H/H. .   ID: Off antibiotics. Stable.  Therapy: OOB/ambulate  Tubes: Monitor Chest tube output  Disposition: Awaiting insurance authorization   Discussed with Cardiothoracic Team at AM rounds.

## 2018-09-17 NOTE — DISCHARGE NOTE ADULT - MEDICATION SUMMARY - MEDICATIONS TO TAKE
I will START or STAY ON the medications listed below when I get home from the hospital:    oxyCODONE 5 mg oral capsule  -- 1 cap(s) by mouth every 4 hours x 3 days, As Needed -for moderate pain MDD:5 caps   -- Caution federal law prohibits the transfer of this drug to any person other  than the person for whom it was prescribed.  It is very important that you take or use this exactly as directed.  Do not skip doses or discontinue unless directed by your doctor.  May cause drowsiness.  Alcohol may intensify this effect.  Use care when operating dangerous machinery.  This prescription cannot be refilled.  Using more of this medication than prescribed may cause serious breathing problems.    -- Indication: For pain medication    Tylenol 325 mg oral tablet  -- 2 tab(s) by mouth every 4 hours as needed for mild pain control  -- Indication: For pain medication    apixaban 2.5 mg oral tablet  -- 1 tab(s) by mouth every 12 hours  -- Indication: For anticoagulation    metoprolol tartrate 25 mg oral tablet  -- 1.5 tab(s) by mouth 2 times a day  -- Indication: For heart rate, rhythm    Advair Diskus 250 mcg-50 mcg inhalation powder  -- 1 puff(s) inhaled 2 times a day  -- Indication: For opens airways    Spiriva 18 mcg inhalation capsule  -- 1 cap(s) inhaled once a day  -- Indication: For opens airways    ipratropium 500 mcg/2.5 mL inhalation solution  -- 2.5 milliliter(s) inhaled every 6 hours  -- Indication: For opens airways    levalbuterol 0.63 mg/3 mL inhalation solution  -- 3 milliliter(s) inhaled every 6 hours  -- Indication: For opens airways    dornase selina 2.5 mg/2.5 mL inhalation solution  -- 2.5 milliliter(s) inhaled once a day  -- Indication: For breaks up congestion    melatonin 3 mg oral tablet  -- 1 tab(s) by mouth once a day (at bedtime), As needed, Sleep  -- Indication: For Sleep

## 2018-09-26 DIAGNOSIS — R13.10 DYSPHAGIA, UNSPECIFIED: ICD-10-CM

## 2018-10-01 ENCOUNTER — APPOINTMENT (OUTPATIENT)
Dept: THORACIC SURGERY | Facility: CLINIC | Age: 83
End: 2018-10-01

## 2018-10-01 VITALS
OXYGEN SATURATION: 91 % | BODY MASS INDEX: 17.93 KG/M2 | HEIGHT: 64 IN | TEMPERATURE: 98.1 F | DIASTOLIC BLOOD PRESSURE: 57 MMHG | HEART RATE: 69 BPM | WEIGHT: 105 LBS | RESPIRATION RATE: 18 BRPM | SYSTOLIC BLOOD PRESSURE: 97 MMHG

## 2018-10-01 VITALS
HEART RATE: 69 BPM | HEIGHT: 64 IN | BODY MASS INDEX: 17.93 KG/M2 | OXYGEN SATURATION: 91 % | RESPIRATION RATE: 20 BRPM | TEMPERATURE: 98.1 F | WEIGHT: 105 LBS | SYSTOLIC BLOOD PRESSURE: 97 MMHG | DIASTOLIC BLOOD PRESSURE: 57 MMHG

## 2018-10-02 ENCOUNTER — OUTPATIENT (OUTPATIENT)
Dept: OUTPATIENT SERVICES | Facility: HOSPITAL | Age: 83
LOS: 1 days | End: 2018-10-02

## 2018-10-02 ENCOUNTER — OUTPATIENT (OUTPATIENT)
Dept: OUTPATIENT SERVICES | Facility: HOSPITAL | Age: 83
LOS: 1 days | Discharge: HOME | End: 2018-10-02

## 2018-10-02 ENCOUNTER — APPOINTMENT (OUTPATIENT)
Dept: THORACIC SURGERY | Facility: CLINIC | Age: 83
End: 2018-10-02
Payer: MEDICARE

## 2018-10-02 ENCOUNTER — APPOINTMENT (OUTPATIENT)
Dept: RADIOLOGY | Facility: HOSPITAL | Age: 83
End: 2018-10-02
Payer: MEDICARE

## 2018-10-02 ENCOUNTER — APPOINTMENT (OUTPATIENT)
Dept: SPEECH THERAPY | Facility: HOSPITAL | Age: 83
End: 2018-10-02
Payer: MEDICARE

## 2018-10-02 ENCOUNTER — APPOINTMENT (OUTPATIENT)
Dept: RADIOLOGY | Facility: HOSPITAL | Age: 83
End: 2018-10-02

## 2018-10-02 VITALS
WEIGHT: 105 LBS | OXYGEN SATURATION: 92 % | DIASTOLIC BLOOD PRESSURE: 60 MMHG | SYSTOLIC BLOOD PRESSURE: 100 MMHG | HEART RATE: 70 BPM | BODY MASS INDEX: 17.93 KG/M2 | HEIGHT: 64 IN | RESPIRATION RATE: 18 BRPM

## 2018-10-02 DIAGNOSIS — C80.1 MALIGNANT (PRIMARY) NEOPLASM, UNSPECIFIED: ICD-10-CM

## 2018-10-02 DIAGNOSIS — Z09 ENCOUNTER FOR FOLLOW-UP EXAMINATION AFTER COMPLETED TREATMENT FOR CONDITIONS OTHER THAN MALIGNANT NEOPLASM: ICD-10-CM

## 2018-10-02 DIAGNOSIS — R13.12 DYSPHAGIA, OROPHARYNGEAL PHASE: ICD-10-CM

## 2018-10-02 DIAGNOSIS — J69.0 PNEUMONITIS DUE TO INHALATION OF FOOD AND VOMIT: ICD-10-CM

## 2018-10-02 DIAGNOSIS — R13.10 DYSPHAGIA, UNSPECIFIED: ICD-10-CM

## 2018-10-02 PROCEDURE — 99024 POSTOP FOLLOW-UP VISIT: CPT

## 2018-10-02 PROCEDURE — 74230 X-RAY XM SWLNG FUNCJ C+: CPT | Mod: 26

## 2018-11-02 PROBLEM — J69.0 ASPIRATION PNEUMONIA: Status: ACTIVE | Noted: 2018-11-02

## 2018-11-07 ENCOUNTER — APPOINTMENT (OUTPATIENT)
Dept: SPEECH THERAPY | Facility: CLINIC | Age: 83
End: 2018-11-07

## 2018-11-14 ENCOUNTER — APPOINTMENT (OUTPATIENT)
Dept: SPEECH THERAPY | Facility: CLINIC | Age: 83
End: 2018-11-14

## 2018-11-15 ENCOUNTER — APPOINTMENT (OUTPATIENT)
Dept: SPEECH THERAPY | Facility: CLINIC | Age: 83
End: 2018-11-15

## 2018-11-19 ENCOUNTER — APPOINTMENT (OUTPATIENT)
Dept: SPEECH THERAPY | Facility: CLINIC | Age: 83
End: 2018-11-19

## 2018-11-26 ENCOUNTER — APPOINTMENT (OUTPATIENT)
Dept: SPEECH THERAPY | Facility: CLINIC | Age: 83
End: 2018-11-26

## 2018-11-27 ENCOUNTER — MEDICATION RENEWAL (OUTPATIENT)
Age: 83
End: 2018-11-27

## 2018-11-29 ENCOUNTER — APPOINTMENT (OUTPATIENT)
Dept: SPEECH THERAPY | Facility: CLINIC | Age: 83
End: 2018-11-29

## 2018-11-29 DIAGNOSIS — R13.13 DYSPHAGIA, PHARYNGEAL PHASE: ICD-10-CM

## 2018-12-03 ENCOUNTER — APPOINTMENT (OUTPATIENT)
Dept: SPEECH THERAPY | Facility: CLINIC | Age: 83
End: 2018-12-03

## 2018-12-06 ENCOUNTER — APPOINTMENT (OUTPATIENT)
Dept: SPEECH THERAPY | Facility: CLINIC | Age: 83
End: 2018-12-06

## 2018-12-10 ENCOUNTER — APPOINTMENT (OUTPATIENT)
Dept: SPEECH THERAPY | Facility: CLINIC | Age: 83
End: 2018-12-10

## 2018-12-13 ENCOUNTER — APPOINTMENT (OUTPATIENT)
Dept: SPEECH THERAPY | Facility: CLINIC | Age: 83
End: 2018-12-13

## 2018-12-14 ENCOUNTER — OTHER (OUTPATIENT)
Age: 83
End: 2018-12-14

## 2019-01-08 ENCOUNTER — APPOINTMENT (OUTPATIENT)
Dept: THORACIC SURGERY | Facility: CLINIC | Age: 84
End: 2019-01-08

## 2019-01-30 NOTE — PROCEDURE NOTE - NSTIMEOUT_GEN_A_CORE
Patient's first and last name, , procedure, and correct site confirmed prior to the start of procedure.
oral

## 2021-06-25 NOTE — ASU PREOP CHECKLIST - BLOOD AVAILABLE
Graciela called on behalf of the patient to move up patients appointment. Patient is also having issues getting strips for the new kimmie meter you gave her so she needs some. Graciela said to call patient back directly using an .    yes

## 2021-10-20 NOTE — PROGRESS NOTE ADULT - SUBJECTIVE AND OBJECTIVE BOX
83 F PMH  COPD, OP, cataracts, w cc of SOB  The pt developed SOB, REED, occasional nonproductive cough, fatigue, weight loss of 15 lbs in last 6 months. A CXR, followed by CT scan revealed multiple right lung nodules.    210056:  RVATS /   Right upper lobectomy       Issues:   Atelectasis  Shortness of breath / Bronchospasm / COPD exacerbation  SVT / a-FIB  Pneumothorax  Air leak              Postop pain  HTN    Home Medications:  Advair Diskus 250 mcg-50 mcg inhalation powder: 1 puff(s) inhaled 2 times a day (27 Aug 2018 12:42)  Spiriva 18 mcg inhalation capsule: 1 cap(s) inhaled once a day (27 Aug 2018 12:42)    PAST MEDICAL & SURGICAL HISTORY:  Lung nodules  COPD (Chronic Obstructive Pulmonary Disease): diagnose in ~2013  Colon Polyps  History of Osteoporosis  History of Cataract Surgery: left eye  Hip Replacement: bilateral    MEDICATIONS  (STANDING):  acetaminophen  IVPB. 1000 milliGRAM(s) IV Intermittent once  docusate sodium Liquid 100 milliGRAM(s) Oral three times a day  dornase selina Solution 2.5 milliGRAM(s) Inhalation daily  famotidine    Tablet 20 milliGRAM(s) Oral daily  fluconAZOLE IVPB      fluconAZOLE IVPB 200 milliGRAM(s) IV Intermittent every 24 hours  heparin  Injectable 5000 Unit(s) SubCutaneous every 8 hours  ipratropium    for Nebulization 500 MICROGram(s) Nebulizer every 6 hours  levalbuterol Inhalation 0.63 milliGRAM(s) Inhalation every 6 hours  meropenem  IVPB 1000 milliGRAM(s) IV Intermittent every 8 hours  metoprolol tartrate 25 milliGRAM(s) Oral every 8 hours  potassium phosphate IVPB 15 milliMole(s) IV Intermittent once  sodium chloride 0.9%. 1000 milliLiter(s) (30 mL/Hr) IV Continuous <Continuous>  sodium chloride 3%  Inhalation 4 milliLiter(s) Inhalation every 6 hours  vancomycin  IVPB 1000 milliGRAM(s) IV Intermittent every 24 hours  vancomycin  IVPB        ICU Vital Signs Last 24 Hrs  T(C): 36.9 (01 Sep 2018 04:00), Max: 37 (31 Aug 2018 08:00)  T(F): 98.5 (01 Sep 2018 04:00), Max: 98.6 (31 Aug 2018 08:00)  HR: 86 (01 Sep 2018 06:00) (81 - 111)  BP: 141/64 (01 Sep 2018 05:00) (100/57 - 150/62)  BP(mean): 82 (01 Sep 2018 05:00) (66 - 95)  RR: 19 (01 Sep 2018 06:00) (12 - 26)  SpO2: 99% (01 Sep 2018 06:00) (94% - 100%)      Physical exam:                                                   Gen:                  WD WN in NAD              Neuro:              Alert & Oriented X 3, no focal deficits                          Cardiovascular:   S1 & S2, regular                           Respiratory:        Good B/L Air entry is fair and decreased on both sides R>L, has bilateral conducted sounds / + rhonchi /rales                          GI:                      + bowel sounds, Soft, nondistended and nontender,                           Ext:                     No cyanosis or edema,       I&O's Summary    30 Aug 2018 07:01  -  31 Aug 2018 07:00  --------------------------------------------------------  IN: 1960 mL / OUT: 1825 mL / NET: 135 mL    31 Aug 2018 07:01  -  01 Sep 2018 06:31  --------------------------------------------------------  IN: 900 mL / OUT: 1510 mL / NET: -610 mL        Labs:                                                                           13.1   12.08 )-----------( 309      ( 01 Sep 2018 04:00 )             38.7                            09-01    135  |  99  |  16  ----------------------------<  100<H>  4.1   |  24  |  0.40<L>    Ca    8.7      01 Sep 2018 04:00  Phos  2.2     09-01  Mg     1.9     09-01    CXR  [id number]  The cardia mediastinal silhouette is not well evaluated in this   projection.  Lines and tubes in unchanged position from prior examination.  Tiny right apical pneumothorax, unchanged.  Right lower lobe subsegmental atelectasis with adjacent effusion.  Right-sided pulmonary edema, unchanged.  IMPRESSION:   Stable examination from prior. Unchanged right apical pneumothorax and   right lower lung partial atelectasis and adjacent pleural effusion.   Asymmetric right-sided edema is stable.                           Plan:  83 F PMH  COPD, OP, cataracts, w cc of SOB  The pt developed SOB, REED, occasional nonproductive cough, fatigue, weight loss of 15 lbs in last 6 months. A CXR, followed by CT scan revealed multiple right lung nodules.  [id number]:  RVATS /   Right upper lobectomy    Issues:   Atelectasis  Shortness of breath / Bronchospasm / COPD exacerbation  SVT / a-FIB  Pneumothorax  Air leak              Postop pain  HTN    Neuro:                                         Pain control                             Cardiovascular:                                          Continue hemodynamic monitoring.  SVT / A-fb: EPS evaluated and recommended Lopressor 25mg bid, increase as tolerated & PPM if experiences pauses again.   HTN: Increased lopressor to 25mg q8hrs                            Respiratory:                                           S/P FOB  Pt is on 2L  nasal canula,    AM CXR -  rt lower atelectasis – improved post FOB Continue aggressive chest PT / Pulmozyme / 3% saline inhalation / Xopenex and Atrovent nebs                                         mild SOB, not in any distress.                                         Using incentive spirometry 500cc                                         Monitor chest tube output                                         Pneumothorax is improved with + air leak.                Dropped lung when put on water seal, Chest tube back  to suction              Monitor clinically and repeat  CXR  later                                                               COPD: Continue  Xopenex / Atrovent nebulizer Rx.                           GI                                         NPO w TFs -on holdfor possible FOB                                         Continue GI prophylaxis with Protonix                                         Continue Zofran / Reglan for nausea - PRN	                                                                 Renal:                                         Continue LR 30cc/hr                                         Monitor I/Os and electrolytes                                                                                        Hem/ Onc:                                         SQH & SCDs for VTE prophylaxis                                         Monitor chest tube output &  signs of bleeding.                                          Follow CBCs                           Infectious disease:                                          No signs of infection. Monitor for fever / leukocytosis.                                          All surgical incision / chest tube sites look clean                            Endocrine                                           Continue Accu-Checks with coverage    All available pertinent clinical, laboratory, radiographic, hemodynamic, echocardiographic, respiratory data, microbiologic data and chart were reviewed and analyzed frequently. GI and DVT prophylaxis, glycemic control, head of bed elevation and skin care issues were addressed.  Patient seen, examined and plan discussed with CT Surgery / CTICU team during rounds.    I have spent 45  minutes of critical care time with this pt between  0001 am  and  8am    Napoleon Barron MD right abnormal...

## 2022-03-03 NOTE — ASU PATIENT PROFILE, ADULT - PATIENT REPRESENTATIVE PHONE
What Is The Reason For Today's Visit?: History of Non-Melanoma Skin Cancer How Many Skin Cancers Have You Had?: more than one When Was Your Last Cancer Diagnosed?: 2019 [id number]

## 2024-05-02 NOTE — H&P PST ADULT - OPHTHALMOLOGIC
[Joint Pain] : joint pain [Skin Rash] : skin rash [Negative] : Heme/Lymph [[id number]] : B/L in hands, scoliosis  [de-identified] : Psorisis B/L on LE details…

## 2025-05-14 NOTE — PACU DISCHARGE NOTE - THE ANESTHESIA ORDERS USED IN THE PACU ORDER SET WILL BE DISCONTINUED UPON TRANSFER OF THIS PATIENT
Airway       Patient location during procedure: OR       Procedure Start/Stop Times: 5/14/2025 2:13 PM  Staff -        CRNA: Elizabeth Jennings APRN CRNA       Performed By: CRNA  Consent for Airway        Urgency: elective  Indications and Patient Condition       Indications for airway management: mercedez-procedural       Induction type:intravenous       Mask difficulty assessment: 1 - vent by mask    Final Airway Details       Final airway type: endotracheal airway       Successful airway: ETT - single  Endotracheal Airway Details        ETT size (mm): 7.0       Cuffed: yes       Successful intubation technique: direct laryngoscopy       DL Blade Type: MAC 3       Grade View of Cords: 2       Adjucts: stylet       Position: Right       Measured from: gums/teeth       Secured at (cm): 22       Bite block used: None    Post intubation assessment        Placement verified by: capnometry, equal breath sounds and chest rise        Number of attempts at approach: 1       Number of other approaches attempted: 0       Secured with: tape       Ease of procedure: easy       Dentition: Intact and Unchanged    Medication(s) Administered   Medication Administration Time: 5/14/2025 2:13 PM         Statement Selected